# Patient Record
Sex: FEMALE | Race: WHITE | NOT HISPANIC OR LATINO | Employment: FULL TIME | URBAN - METROPOLITAN AREA
[De-identification: names, ages, dates, MRNs, and addresses within clinical notes are randomized per-mention and may not be internally consistent; named-entity substitution may affect disease eponyms.]

---

## 2019-04-04 ENCOUNTER — HOSPITAL ENCOUNTER (OUTPATIENT)
Dept: OTHER | Facility: HOSPITAL | Age: 59
Discharge: HOME | End: 2019-04-04
Attending: NURSE PRACTITIONER

## 2019-04-04 LAB
APPEARANCE UR: CLEAR
BILIRUB UR QL: NEGATIVE
COLOR UR: ABNORMAL
CONV BACTERIA: NEGATIVE
CONV COLLECTION SOURCE (UA): ABNORMAL
CONV HYALINE CASTS IN URINE MICRO: ABNORMAL /[LPF]
CONV UROBILINOGEN IN URINE BY AUTOMATED TEST STRIP: 1 {EHRLICHU}/DL (ref 0.1–1)
GLUCOSE UR QL: NEGATIVE MG/DL
HGB UR QL STRIP: ABNORMAL
KETONES UR QL STRIP: NEGATIVE MG/DL
LEUKOCYTE ESTERASE UR QL STRIP: ABNORMAL
NITRITE UR QL STRIP: NEGATIVE
PH UR STRIP.AUTO: 5 [PH] (ref 5–8)
PROT UR QL: NEGATIVE MG/DL
RBC #/AREA URNS HPF: ABNORMAL /[HPF]
SP GR UR: 1.03 (ref 1–1.03)
SQUAMOUS SPT QL MICRO: ABNORMAL /[HPF]
WBC #/AREA URNS HPF: ABNORMAL /[HPF]

## 2019-04-06 LAB — BACTERIA UR CULT: NORMAL

## 2019-10-31 ENCOUNTER — HOSPITAL ENCOUNTER (OUTPATIENT)
Dept: OTHER | Facility: HOSPITAL | Age: 59
Discharge: HOME | End: 2019-10-31

## 2019-10-31 LAB
ALBUMIN SERPL-MCNC: 4.3 G/DL (ref 3.5–5)
ALBUMIN/GLOB SERPL: 1.3 {RATIO} (ref 1.4–2.6)
ALP SERPL-CCNC: 98 U/L (ref 53–141)
ALT SERPL-CCNC: 42 U/L (ref 10–40)
ANION GAP SERPL CALC-SCNC: 20 MMOL/L (ref 8–19)
AST SERPL-CCNC: 33 U/L (ref 15–50)
BASOPHILS # BLD AUTO: 0.08 10*3/UL (ref 0–0.2)
BASOPHILS NFR BLD AUTO: 1.2 % (ref 0–3)
BILIRUB SERPL-MCNC: 0.32 MG/DL (ref 0.2–1.3)
BUN SERPL-MCNC: 18 MG/DL (ref 5–25)
BUN/CREAT SERPL: 24 {RATIO} (ref 6–20)
CALCIUM SERPL-MCNC: 9.8 MG/DL (ref 8.7–10.4)
CHLORIDE SERPL-SCNC: 104 MMOL/L (ref 99–111)
CHOLEST SERPL-MCNC: 176 MG/DL (ref 107–200)
CHOLEST/HDLC SERPL: 4.5 {RATIO} (ref 3–6)
CONV ABS IMM GRAN: 0.05 10*3/UL (ref 0–0.2)
CONV CO2: 22 MMOL/L (ref 22–32)
CONV IMMATURE GRAN: 0.7 % (ref 0–1.8)
CONV TOTAL PROTEIN: 7.6 G/DL (ref 6.3–8.2)
CREAT UR-MCNC: 0.75 MG/DL (ref 0.5–0.9)
DEPRECATED RDW RBC AUTO: 42.5 FL (ref 36.4–46.3)
EOSINOPHIL # BLD AUTO: 0.39 10*3/UL (ref 0–0.7)
EOSINOPHIL # BLD AUTO: 5.7 % (ref 0–7)
ERYTHROCYTE [DISTWIDTH] IN BLOOD BY AUTOMATED COUNT: 12.1 % (ref 11.7–14.4)
EST. AVERAGE GLUCOSE BLD GHB EST-MCNC: 131 MG/DL
GFR SERPLBLD BASED ON 1.73 SQ M-ARVRAT: >60 ML/MIN/{1.73_M2}
GLOBULIN UR ELPH-MCNC: 3.3 G/DL (ref 2–3.5)
GLUCOSE SERPL-MCNC: 132 MG/DL (ref 65–99)
HBA1C MFR BLD: 6.2 % (ref 3.5–5.7)
HCT VFR BLD AUTO: 43.2 % (ref 37–47)
HDLC SERPL-MCNC: 39 MG/DL (ref 40–60)
HGB BLD-MCNC: 14.2 G/DL (ref 12–16)
LDLC SERPL CALC-MCNC: 98 MG/DL (ref 70–100)
LYMPHOCYTES # BLD AUTO: 2.17 10*3/UL (ref 1–5)
LYMPHOCYTES NFR BLD AUTO: 31.9 % (ref 20–45)
MCH RBC QN AUTO: 31.6 PG (ref 27–31)
MCHC RBC AUTO-ENTMCNC: 32.9 G/DL (ref 33–37)
MCV RBC AUTO: 96 FL (ref 81–99)
MONOCYTES # BLD AUTO: 0.68 10*3/UL (ref 0.2–1.2)
MONOCYTES NFR BLD AUTO: 10 % (ref 3–10)
NEUTROPHILS # BLD AUTO: 3.44 10*3/UL (ref 2–8)
NEUTROPHILS NFR BLD AUTO: 50.5 % (ref 30–85)
NRBC CBCN: 0 % (ref 0–0.7)
OSMOLALITY SERPL CALC.SUM OF ELEC: 298 MOSM/KG (ref 273–304)
PLATELET # BLD AUTO: 257 10*3/UL (ref 130–400)
PMV BLD AUTO: 10 FL (ref 9.4–12.3)
POTASSIUM SERPL-SCNC: 4.1 MMOL/L (ref 3.5–5.3)
RBC # BLD AUTO: 4.5 10*6/UL (ref 4.2–5.4)
SODIUM SERPL-SCNC: 142 MMOL/L (ref 135–147)
TRIGL SERPL-MCNC: 193 MG/DL (ref 40–150)
TSH SERPL-ACNC: 3.57 M[IU]/L (ref 0.27–4.2)
VLDLC SERPL-MCNC: 39 MG/DL (ref 5–37)
WBC # BLD AUTO: 6.81 10*3/UL (ref 4.8–10.8)

## 2019-11-08 ENCOUNTER — OFFICE VISIT CONVERTED (OUTPATIENT)
Dept: FAMILY MEDICINE CLINIC | Facility: CLINIC | Age: 59
End: 2019-11-08
Attending: NURSE PRACTITIONER

## 2019-12-09 ENCOUNTER — OFFICE VISIT CONVERTED (OUTPATIENT)
Dept: FAMILY MEDICINE CLINIC | Facility: CLINIC | Age: 59
End: 2019-12-09
Attending: NURSE PRACTITIONER

## 2019-12-20 ENCOUNTER — HOSPITAL ENCOUNTER (OUTPATIENT)
Dept: GENERAL RADIOLOGY | Facility: HOSPITAL | Age: 59
Discharge: HOME | End: 2019-12-20
Attending: NURSE PRACTITIONER

## 2020-01-07 ENCOUNTER — OFFICE VISIT CONVERTED (OUTPATIENT)
Dept: FAMILY MEDICINE CLINIC | Facility: CLINIC | Age: 60
End: 2020-01-07
Attending: NURSE PRACTITIONER

## 2020-01-09 ENCOUNTER — HOSPITAL ENCOUNTER (OUTPATIENT)
Dept: GENERAL RADIOLOGY | Facility: HOSPITAL | Age: 60
Discharge: HOME | End: 2020-01-09
Attending: NURSE PRACTITIONER

## 2020-02-05 ENCOUNTER — OFFICE VISIT CONVERTED (OUTPATIENT)
Dept: FAMILY MEDICINE CLINIC | Facility: CLINIC | Age: 60
End: 2020-02-05
Attending: NURSE PRACTITIONER

## 2020-11-12 ENCOUNTER — HOSPITAL ENCOUNTER (OUTPATIENT)
Dept: URGENT CARE | Facility: CLINIC | Age: 60
Discharge: HOME | End: 2020-11-12

## 2020-11-14 LAB — BACTERIA SPEC AEROBE CULT: NORMAL

## 2020-11-16 LAB — SARS-COV-2 RNA SPEC QL NAA+PROBE: NOT DETECTED

## 2020-12-17 ENCOUNTER — HOSPITAL ENCOUNTER (OUTPATIENT)
Dept: OTHER | Facility: HOSPITAL | Age: 60
Discharge: HOME | End: 2020-12-17

## 2020-12-17 LAB
ALBUMIN SERPL-MCNC: 3.9 G/DL (ref 3.5–5)
ALBUMIN/GLOB SERPL: 1.2 {RATIO} (ref 1.4–2.6)
ALP SERPL-CCNC: 99 U/L (ref 53–141)
ALT SERPL-CCNC: 35 U/L (ref 10–40)
ANION GAP SERPL CALC-SCNC: 14 MMOL/L (ref 8–19)
AST SERPL-CCNC: 34 U/L (ref 15–50)
BASOPHILS # BLD AUTO: 0.08 10*3/UL (ref 0–0.2)
BASOPHILS NFR BLD AUTO: 1 % (ref 0–3)
BILIRUB SERPL-MCNC: <0.15 MG/DL (ref 0.2–1.3)
BUN SERPL-MCNC: 18 MG/DL (ref 5–25)
BUN/CREAT SERPL: 25 {RATIO} (ref 6–20)
CALCIUM SERPL-MCNC: 9.6 MG/DL (ref 8.7–10.4)
CHLORIDE SERPL-SCNC: 106 MMOL/L (ref 99–111)
CHOLEST SERPL-MCNC: 175 MG/DL (ref 107–200)
CHOLEST/HDLC SERPL: 4.5 {RATIO} (ref 3–6)
CONV ABS IMM GRAN: 0.04 10*3/UL (ref 0–0.2)
CONV CO2: 24 MMOL/L (ref 22–32)
CONV IMMATURE GRAN: 0.5 % (ref 0–1.8)
CONV TOTAL PROTEIN: 7.1 G/DL (ref 6.3–8.2)
CREAT UR-MCNC: 0.73 MG/DL (ref 0.5–0.9)
DEPRECATED RDW RBC AUTO: 41.1 FL (ref 36.4–46.3)
EOSINOPHIL # BLD AUTO: 0.35 10*3/UL (ref 0–0.7)
EOSINOPHIL # BLD AUTO: 4.3 % (ref 0–7)
ERYTHROCYTE [DISTWIDTH] IN BLOOD BY AUTOMATED COUNT: 11.9 % (ref 11.7–14.4)
EST. AVERAGE GLUCOSE BLD GHB EST-MCNC: 131 MG/DL
GFR SERPLBLD BASED ON 1.73 SQ M-ARVRAT: >60 ML/MIN/{1.73_M2}
GLOBULIN UR ELPH-MCNC: 3.2 G/DL (ref 2–3.5)
GLUCOSE SERPL-MCNC: 121 MG/DL (ref 65–99)
HBA1C MFR BLD: 6.2 % (ref 3.5–5.7)
HCT VFR BLD AUTO: 41.3 % (ref 37–47)
HDLC SERPL-MCNC: 39 MG/DL (ref 40–60)
HGB BLD-MCNC: 13.7 G/DL (ref 12–16)
LDLC SERPL CALC-MCNC: 86 MG/DL (ref 70–100)
LYMPHOCYTES # BLD AUTO: 2.7 10*3/UL (ref 1–5)
LYMPHOCYTES NFR BLD AUTO: 33 % (ref 20–45)
MCH RBC QN AUTO: 31 PG (ref 27–31)
MCHC RBC AUTO-ENTMCNC: 33.2 G/DL (ref 33–37)
MCV RBC AUTO: 93.4 FL (ref 81–99)
MONOCYTES # BLD AUTO: 0.63 10*3/UL (ref 0.2–1.2)
MONOCYTES NFR BLD AUTO: 7.7 % (ref 3–10)
NEUTROPHILS # BLD AUTO: 4.37 10*3/UL (ref 2–8)
NEUTROPHILS NFR BLD AUTO: 53.5 % (ref 30–85)
NRBC CBCN: 0 % (ref 0–0.7)
OSMOLALITY SERPL CALC.SUM OF ELEC: 293 MOSM/KG (ref 273–304)
PLATELET # BLD AUTO: 256 10*3/UL (ref 130–400)
PMV BLD AUTO: 10.1 FL (ref 9.4–12.3)
POTASSIUM SERPL-SCNC: 3.9 MMOL/L (ref 3.5–5.3)
RBC # BLD AUTO: 4.42 10*6/UL (ref 4.2–5.4)
SODIUM SERPL-SCNC: 140 MMOL/L (ref 135–147)
TRIGL SERPL-MCNC: 250 MG/DL (ref 40–150)
TSH SERPL-ACNC: 1.7 M[IU]/L (ref 0.27–4.2)
VLDLC SERPL-MCNC: 50 MG/DL (ref 5–37)
WBC # BLD AUTO: 8.17 10*3/UL (ref 4.8–10.8)

## 2020-12-28 ENCOUNTER — HOSPITAL ENCOUNTER (OUTPATIENT)
Dept: OTHER | Facility: HOSPITAL | Age: 60
Discharge: HOME | End: 2020-12-28
Attending: INTERNAL MEDICINE

## 2021-01-21 ENCOUNTER — HOSPITAL ENCOUNTER (OUTPATIENT)
Dept: OTHER | Facility: HOSPITAL | Age: 61
Discharge: HOME | End: 2021-01-21
Attending: INTERNAL MEDICINE

## 2021-04-02 ENCOUNTER — OFFICE VISIT CONVERTED (OUTPATIENT)
Dept: FAMILY MEDICINE CLINIC | Facility: CLINIC | Age: 61
End: 2021-04-02
Attending: NURSE PRACTITIONER

## 2021-04-15 ENCOUNTER — OFFICE VISIT CONVERTED (OUTPATIENT)
Dept: UROLOGY | Facility: CLINIC | Age: 61
End: 2021-04-15
Attending: UROLOGY

## 2021-04-15 ENCOUNTER — CONVERSION ENCOUNTER (OUTPATIENT)
Dept: SURGERY | Facility: CLINIC | Age: 61
End: 2021-04-15

## 2021-04-15 LAB
BILIRUB UR QL STRIP: NEGATIVE
COLOR UR: YELLOW
CONV BACTERIA IN URINE MICRO: 0
CONV CALCIUM OXALATE CRYSTALS /HPF IN URINE SEDIMENT BY MICROSCOPY: 0
CONV CLARITY OF URINE: CLEAR
CONV PROTEIN IN URINE BY AUTOMATED TEST STRIP: NEGATIVE
CONV UROBILINOGEN IN URINE BY AUTOMATED TEST STRIP: 0.2
GLUCOSE UR QL: NEGATIVE
HGB UR QL STRIP: NORMAL
KETONES UR QL STRIP: NEGATIVE
LEUKOCYTE ESTERASE UR QL STRIP: NEGATIVE
NITRITE UR QL STRIP: NEGATIVE
PH UR STRIP.AUTO: 5.5 [PH]
RBC #/AREA URNS HPF: 0 /[HPF]
RENAL EPI CELLS #/AREA URNS HPF: 0 /[HPF]
SP GR UR: NORMAL
SQUAMOUS SPT QL MICRO: 0
WBC #/AREA URNS HPF: 0 /[HPF]

## 2021-04-16 ENCOUNTER — TELEMEDICINE CONVERTED (OUTPATIENT)
Dept: PSYCHIATRY | Facility: CLINIC | Age: 61
End: 2021-04-16
Attending: NURSE PRACTITIONER

## 2021-05-11 NOTE — H&P
History and Physical      Patient Name: Ami Lewis   Patient ID: [id number]   Sex: Female   YOB: 1960    Primary Care Provider: Royal DE OLIVEIRA   Referring Provider: Royal DE OLIVEIRA    Visit Date: April 16, 2021    Provider: YENNIFER Smith   Location: [facility]   Location Address: [address] [place] [address]  [id number]   Location Phone: [phone]          Chief Complaint     Patient presents with depression       History Of Present Illness  Ami Lewis is a 60 year old /White female who presents to the office today referred by Royal DE OLIVEIRA.   Chart Review 4/16/21: Patient last seen by PCP 4/2/21 and referred related to ADD. PHQ9 score of 7. Patient has history of arthritis, deafness and migraines. No recent lab work, EKG or OP head imaging.   Virtual visit via Zoom audio and video due to the COVID-19 pandemic. Patient is accepting of and agreeable to appointment. The appointment consisted of the patient and I only. Interview: Patient reports she has had trouble with concentration and focus since she was a child. Pt reports getting in trouble in school r/t being to hyper, talkative and restless. Pt able to graduate high school and take many college classes with no stimulant medication. Pt reports trouble focusing more at home, in which she is unable to concentrate when reading, giving the example that she can read a paragraph over and over without being able to retain the information. Pt reports being easily distracted. Pt reports less trouble focusing at work, as she works in a fast paced environment where she is constantly busy.   PSYCHIATRIC REVIEW OF SYSTEMS: Patient reports current depression (sleep problems, poor energy or concentration or memory, appetite changes, psychomotor retardation) and anxiety (worry large amount of things to excess, causes sleep disturbance). Pt denies any current or previous  hallucinations/delusions, paranoia, manic symptoms (euphoria, increased energy, grandiosity, decreased sleep, pleasure seeking, pressured speech, impulsivity), panic attacks, PTSD (flashbacks, nightmares, avoidance/numb response to activities, detachment/withdrawal, startle response, irritability).   Denies SI HI AVH.   ...   Past Psychiatric History:  Began Psychiatric Treatment:    Dx: Denies   Psychiatrist: Denies   Therapist: Denies   : Denies   Admissions History: Denies   Medication Trials: Pt was on Zoloft in the past for depression, with no benefit.   Self-Harm: Denies   Suicide Attempts: Denies   Substance Abuse History:  Types: Smokes 1/2 ppd of cigarettes. Pt reports interest in smoking cessation.   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   Kids: 4 adult children   House: Lives alone    Hx: Denies   Family History:  Suicide Attempts: Denies   Suicide Completions: Denies   Substance Use: Denies   Psychiatric Conditions: Denies    depression, psychosis, anxiety: Denies   Developmental History:  Born: [facility], but moved to [place] in  as father was in .   Siblings: 2 brothers and one sister   Childhood: no abuse   High School: Graduate   College: Some   CCAPS/JEOVANY: n/a   Access to Weapons: Denies   Thought Content: no suicidal ideation, no homicidal ideation, no auditory hallucinations, and no visual hallucinations       Past Medical History  Disease Name Date Onset Notes   Arthritis: Ankle/Foot 10/21/2014 --    Deafness --  --    Migraine Headaches --  --          Past Surgical History  Procedure Name Date Notes   Ceasarian section --  --    Tubal ligation --  --          Medication List  Name Date Started Instructions   Novofine 32 32  "gauge x 1/4\" miscellaneous needle 04/02/2021 use as directed   Saxenda 3 mg/0.5 mL (18 mg/3 mL) subcutaneous pen injector 04/02/2021 inject 3 mg by subcutaneous route once daily in the abdomen, thigh, or upper arm         Allergy List  Allergen Name Date Reaction Notes   Codeine Phosphate --  --  --    Codeine Sulfate --  --  --    Muscle Relaxants --  --  --    SULFA (SULFONAMIDES) --  --  --          Family Medical History  Disease Name Relative/Age Notes   Breast Neoplasm, Malignant  --    Ovarian Cancer, Family History  --          Social History  Finding Status Start/Stop Quantity Notes   Alcohol Current some day --/-- --  04/15/2021 - rarely    --  --/-- --  --    Tobacco Current every day --/-- .5 ppd --          Immunizations  NameDate Admin Mfg Trade Name Lot Number Route Inj VIS Given VIS Publication   [id number]/14/2020 SKB Fluarix, quadrivalent, preservative free [id number] NE NE 04/02/2021    Comments:          Review of Systems  · Constitutional  o Admits  o : fatigue  o Denies  o : night sweats  · Eyes  o Denies  o : double vision, blurred vision  · HENT  o Denies  o : vertigo, recent head injury  · Cardiovascular  o Denies  o : chest pain, irregular heart beats  · Respiratory  o Denies  o : shortness of breath, productive cough  · Gastrointestinal  o Denies  o : nausea, vomiting  · Genitourinary  o Admits  o : frequency, incontinence  o Denies  o : dysuria, urinary retention  · Integument  o Denies  o : hair growth change, new skin lesions  · Neurologic  o Denies  o : altered mental status, seizures  · Musculoskeletal  o Denies  o : joint swelling, limitation of motion  · Endocrine  o Denies  o : cold intolerance, heat intolerance  · Psychiatric  o Admits  o : obsessive compulsive disorder  o Denies  o : anxiety, depression, post traumatic stress disorder, psychosis, fredrick  · Allergic-Immunologic  o Denies  o : frequent illnesses      Vitals  Date Time BP Position Site L\R Cuff Size HR RR TEMP " "(F) WT  HT  BMI kg/m2 BSA m2 O2 Sat FR L/min FiO2 HC       04/15/2021 03:13 /51 Sitting    96 - R   243lbs 2oz 5'  9.5\" 35.39 2.33             Physical Examination  · Mental Status Exam  o Appearance  o : good eye contact, normal street clothes, groomed, sitting in chair  o Behavior  o : pleasant and cooperative  o Motor  o : no abnormal  o Speech  o : normal rhythm, rate, volume, tone, not hyperverbal, not pressured, normal prosidy  o Mood  o : \"Good\"  o Affect  o : euthymic  o Thought Content  o : negative suicidal ideations, negative homicidal ideations, negative obsessions  o Perceptions  o : negative auditory hallucinations, negative visual hallucinations  o Thought Process  o : linear  o Insight/Judgement  o : fair/fair  o Cognition  o : grossly intact  o Attention  o : intact              Assessment  · Anxiety Disorder     300.00/F41.9  · Attention Deficit Disorder     314.00/F90.9  · Sleep Disorder     780.50/G47.9  · Depression, unspecified depression type     311/F32.9       Presentation most consistent with   Unspecified depressive disorder  Unspecified anxiety disorder  R/o ADHD    Pt presents today wanting treatment for ADD as she hopes to go to nursing school in the future. Pt does reports some symptoms of ADD, but will send to neuropsychological testing to determine if patient does indeed have ADD. Will start on bupropion xl to target depression, ADD symptoms and smoking cessation.     Referral sent for [facility]- Adult ADHD testing.     16 minutes of supportive psychotherapy with goal to strengthen defenses, promote problems solving, restore adaptive functioning and provide symptom relief. The therapeutic alliance was strengthened to encourage the patient to express their thoughts and feelings. Esteem building was enhanced through praise, reassurance, normalizing and encouragement. Coping skills were enhanced to build distress tolerance skills and emotional regulation. " Patient given education on medication side effects, diagnosis/illness and relapse symptoms. Plan to continue supportive psychotherapy in next appointment to provide symptom relief.     1 month       Plan  · Orders  o Psychiatric Consultation (PSYCH) - - 04/16/2021  o ACO-39: Current medications updated and reviewed (, 1159F) - - 04/15/2021  · Medications  o bupropion HCl 150 mg oral tablet extended release 24 hr   SIG: take 1 tablet (150 mg) by oral route once daily for 30 days   DISP: (30) Tablet with 0 refills  Prescribed on 04/16/2021     o Medications have been Reconciled  o Transition of Care or Provider Policy  · Instructions  o Behavior Modification discussed  o Risk Assessment: Risk of self-harm acutely is low. Risk factors include anxiety disorder, mood disorder, and recent psychosocial stressors (pandemic). Protective factors include no family history, denies access to guns/weapons, no present SI, no history of suicide attempts or self-harm in the past, minimal AODA, healthcare seeking, future orientation, willingness to engage in care. Risk of self-harm chronically is also low but could be further elevated in the event of treatment noncompliance and/or AODA.  o Safety: No acute safety concerns.   o Medications: START bupropion xl 150mg po qday to target depression, adhd symptoms and smoking cessation. Risks, benefits, alternatives discussed with patient including nausea, GI upset, increased energy, exacerbation of irritability, lowering of seizure threshold. After discussion of these risks and benefits, the patient voiced understanding and agreed to proceed.  o Referral: [facility]- Adult ADHD [id number] [place] [address] [phone]  o Follow Up: 1 month  · Disposition  o Call or Return if symptoms worsen or persist.            Electronically Signed by: YENNIFER Smith -Author on April 16, 2021 02:46:53 PM

## 2021-05-11 NOTE — H&P
"   History and Physical      Patient Name: Ami Lewis   Patient ID: [id number]   Sex: Female   YOB: 1960    Primary Care Provider: Royal DE OLIVEIRA   Referring Provider: Royal DE OLIVEIRA    Visit Date: April 15, 2021    Provider: Zahra Gallagher MD   Location: [facility]   Location Address: [address] [place] [address]  [id number]   Location Phone: [phone]          Chief Complaint  · urological issues      History Of Present Illness  The patient is a 60 year old /White female, who presents on referral from Royal DE OLIVEIRA, for an urological evaluation for urinary incontinence which the patient associates with no known event.   Urge Incontinence:   The patient states she has had multiple episodes with and without urgency in the past years. She describes leakage of small amounts, moderate amounts, and large amounts of urine. The patients symptoms are getting worse. The patient uses 2 pads a day. She also reports frequency and nocturia. She denies needing to strain to void, an intermittent stream, and incomplete emptying.   Stress Incontinence:   She does have leaking with coughing, sneezing, and laughing. This has been occuring for years. This leaking has been getting worse.   The patient notes aggravating factors are coughing, laughing, exercising, and sneezing There no identified alleviating factors. Her past medical history is non-contributory.   She denies a history of dementia, diabetes, and recurrent urinary tract infections. The patient has not been previously evaluated for this condition.   Previous Treatment:   She has not received treatment in the past.       Past Medical History  Arthritis: Ankle/Foot; Deafness; Migraine Headaches         Past Surgical History  Ceasarian section; Tubal ligation         Medication List  Novofine 32 32 gauge x 1/4\" miscellaneous needle; Saxenda 3 mg/0.5 mL (18 mg/3 mL) subcutaneous pen injector         Allergy " "List  Codeine Phosphate; Codeine Sulfate; Muscle Relaxants; SULFA (SULFONAMIDES)       Allergies Reconciled  Family Medical History  Breast Neoplasm, Malignant; Ovarian Cancer, Family History         Social History  Alcohol (Current some day); ; Tobacco (Current every day)         Immunizations  Name Date Admin   Influenza 10/14/2020   Influenza 11/08/2019         Review of Systems  · Constitutional  o Denies  o : fatigue, night sweats  · Eyes  o Denies  o : double vision, blurred vision  · HENT  o Denies  o : vertigo, recent head injury  · Breasts  o Denies  o : abnormal changes in breast size, additional breast symptoms except as noted in the HPI  · Cardiovascular  o Denies  o : chest pain, irregular heart beats  · Respiratory  o Denies  o : shortness of breath, productive cough  · Gastrointestinal  o Denies  o : nausea, vomiting  · Genitourinary  o Admits  o : urgency, frequency, incontinence  o Denies  o : dysuria, urinary retention  · Integument  o Denies  o : hair growth change, new skin lesions  · Neurologic  o Denies  o : altered mental status, seizures  · Musculoskeletal  o Denies  o : joint swelling, limitation of motion  · Endocrine  o Denies  o : cold intolerance, heat intolerance  · Heme-Lymph  o Denies  o : petechiae, lymph node enlargement or tenderness  · Allergic-Immunologic  o Denies  o : frequent illnesses      Vitals  Date Time BP Position Site L\R Cuff Size HR RR TEMP (F) WT  HT  BMI kg/m2 BSA m2 O2 Sat FR L/min FiO2        04/15/2021 03:13 /51 Sitting    96 - R   243lbs 2oz 5'  9.5\" 35.39 2.33             Physical Examination  · Constitutional  o Appearance  o : Well nourished, well developed patient in no acute distress. Ambulating without difficulty.  · Respiratory  o Respiratory Effort  o : breathing unlabored  · Skin and Subcutaneous Tissue  o General Inspection  o : No rashes, lesions or areas of discoloration present. Skin turgor is normal.  o General Palpation  o : No " abnormalities, masses or tenderness on palpation.          Results  · In-Office Procedures  o Lab procedure  § Automated dipstick urinalysis with microscopy (48584)   § Color Ur: Yellow   § Clarity Ur: Clear   § Glucose Ur Ql Strip: Negative   § Bilirub Ur Ql Strip: Negative   § Ketones Ur Ql Strip: Negative   § Sp Gr Ur Qn: &gt;=1.030   § Hgb Ur Ql Strip: Moderate   § pH Ur-LsCnc: 5.5   § Prot Ur Ql Strip: Negative   § Urobilinogen Ur Strip-mCnc: 0.2   § Nitrite Ur Ql Strip: Negative   § WBC Est Ur Ql Strip: Negative   § RBC UrnS Qn HPF: 0   § WBC UrnS Qn HPF: 0   § Bacteria UrnS Qn HPF: 0   § Crystals UrnS Qn HPF: 0   § Epithelial Cells (non renal): 0 /HPF  § Epithelial Cells (renal): 00   o Surgical procedure  § IOP - Bladder Scan/Residual Urine (72359)   § Specimen vol Ur: 0       Assessment  · Urge Incontinence     788.31/N39.41  · Female genuine stress incontinence     625.6/N39.3      Plan  · Medications  o Myrbetriq 25 mg oral tablet extended release 24 hr   SIG: take 1 tablet (25 mg) by oral route once daily swallowing whole with water. Do not crush, chew and/or divide. for 28 days   DISP: (28) Tablet with 0 refills  Prescribed on 04/15/2021     o Medications have been Reconciled  o Transition of Care or Provider Policy  · Instructions  o PLAN:  o Diagnosis of urge incontinence was discussed with patient. She was counseled on treatments and behavorial modifications to prevent and reduce urgency. She will proceed with anticholinergic therapy and retutn to clinic for follow up.   o The diagnosis of stress incontinence was discussed in detail with the patient. Ample time was given for discussion and to answer questions. She will proceed with Kegel exercises and return to clinic.   o FOLLOW-UP:  o In 2 months  o Electronically Identified Patient Education Materials Provided Electronically            Electronically Signed by: Zahra Gallagher MD -Author on April 15, 2021 05:34:48 PM

## 2021-05-14 VITALS
BODY MASS INDEX: 35.55 KG/M2 | HEART RATE: 101 BPM | TEMPERATURE: 98.5 F | WEIGHT: 240 LBS | HEIGHT: 69 IN | OXYGEN SATURATION: 97 % | SYSTOLIC BLOOD PRESSURE: 142 MMHG | DIASTOLIC BLOOD PRESSURE: 80 MMHG

## 2021-05-14 VITALS
SYSTOLIC BLOOD PRESSURE: 147 MMHG | DIASTOLIC BLOOD PRESSURE: 51 MMHG | HEIGHT: 69 IN | HEART RATE: 96 BPM | WEIGHT: 243.12 LBS | BODY MASS INDEX: 36.01 KG/M2

## 2021-05-14 NOTE — PROGRESS NOTES
Progress Note      Patient Name: Ami Lewis   Patient ID: [id number]   Sex: Female   YOB: 1960    Primary Care Provider: Royal DE OLIVEIRA    Visit Date: April 2, 2021    Provider: YENNIFER Ortiz   Location: [facility]   Location Address: [address] [place] [address], [address]  [place], [place]  [id number]   Location Phone: [phone]          Chief Complaint  · Talk about ADHD      History Of Present Illness  Ami Lewis is a 60 year old /White female who presents for evaluation and treatment of:      Patient presents to the office today to discuss ADD. She states that she is looking to start back to going to school and has concerns of her inattention and inability to stay focused. Patient does state that she would like an evaluation to discuss possible Maikel options. I did explain that I would refer her to psychiatry for evaluation    Patient states that she would like to start back on Saxenda as well. She states it was very beneficial for but stopped taking it during Covid. She states that she would like refills of this to get started back right away. Did discuss the dosing regimen and for her to start at the 0.6 mg daily for a week.       Past Medical History  Disease Name Date Onset Notes   Arthritis: Ankle/Foot 10/21/2014 --    Deafness --  --    Migraine Headaches --  --          Past Surgical History  Procedure Name Date Notes   Ceasarian section --  --    Tubal ligation --  --          Allergy List  Allergen Name Date Reaction Notes   Codeine Phosphate --  --  --    Codeine Sulfate --  --  --    SULFA (SULFONAMIDES) --  --  --        Allergies Reconciled  Family Medical History  Disease Name Relative/Age Notes   Breast Neoplasm, Malignant  --    Ovarian Cancer, Family History  --          Social History  Finding Status Start/Stop Quantity Notes   Alcohol Current some day --/-- --  social    --  --/-- --  --    Tobacco Former --/-- --  --   "        Immunizations  NameDate Admin Mfg Trade Name Lot Number Route Inj VIS Given VIS Publication   [id number]/14/2020 SKB Fluarix, quadrivalent, preservative free [id number] NE NE 04/02/2021    Comments:          Review of Systems  · Constitutional  o Denies  o : fever, fatigue, weight loss, weight gain  · Cardiovascular  o Denies  o : lower extremity edema, claudication, chest pressure, palpitations  · Respiratory  o Denies  o : shortness of breath, wheezing, cough, hemoptysis, dyspnea on exertion  · Gastrointestinal  o Denies  o : nausea, vomiting, diarrhea, constipation, abdominal pain  · Neurologic  o Admits  o : difficulty concentrating      Vitals  Date Time BP Position Site L\R Cuff Size HR RR TEMP (F) WT  HT  BMI kg/m2 BSA m2 O2 Sat FR L/min FiO2        04/02/2021 03:29 /80 Sitting    101 - R  98.5 240lbs 0oz 5'  9\" 35.44 2.3 97 %            Physical Examination  · Constitutional  o Appearance  o : well-nourished, in no acute distress  · Neck  o Inspection/Palpation  o : normal appearance, no masses or tenderness, trachea midline  o Range of Motion  o : cervical range of motion within normal limits  o Thyroid  o : gland size normal, nontender, no nodules or masses present on palpation  · Respiratory  o Respiratory Effort  o : breathing unlabored  o Inspection of Chest  o : normal appearance  o Auscultation of Lungs  o : normal breath sounds throughout inspiration and expiration  · Cardiovascular  o Heart  o :   § Auscultation of Heart  § : regular rate and rhythm, no murmurs, gallops or rubs  o Peripheral Vascular System  o :   § Extremities  § : no clubbing or edema  · Skin and Subcutaneous Tissue  o General Inspection  o : no rashes or lesions present, no areas of discoloration  o Body Hair  o : hair normal for age, general body hair distribution normal for age  o Digits and Nails  o : no clubbing, cyanosis, deformities or edema present, normal appearing nails  · Neurologic  o Mental Status " "Examination  o :   § Orientation  § : grossly oriented to person, place and time  o Gait and Station  o : normal gait, able to stand without difficulty  · Psychiatric  o Judgement and Insight  o : judgment and insight intact  o Mood and Affect  o : mood normal, affect appropriate  o Presence of Abnormal Thoughts  o : no hallucinations, no delusions present, no psychotic thoughts          Assessment  · Screening for depression     V79.0/Z13.89  · Inattention     799.51/R41.840  · Obesity, Class II, BMI 35-39.9     278.00/E66.9      Plan  · Orders  o Annual depression screening, 15 minutes (, [address]) - V79.0/Z13.89 - 04/02/2021  o Psychiatric Consultation (PSYCH) - V79.0/Z13.89 - 04/02/2021   Dr Johnson  o ACO-18: Positive screen for clinical depression using a standardized tool and a follow-up plan documented () - V79.0/Z13.89 - 04/02/2021  o ACO-18: Positive screen for clinical depression using a standardized tool and a follow-up plan documented () - - 04/02/2021  o ACO-40: Depression Remission at 12 months as demonstrated by a 12 month repeat PHQ-9 of less than 5 () - - 04/02/2021  o ACO-39: Current medications updated and reviewed (, 1159F) - - 04/02/2021  · Medications  o Saxenda 3 mg/0.5 mL (18 mg/3 mL) subcutaneous pen injector   SIG: inject 3 mg by subcutaneous route once daily in the abdomen, thigh, or upper arm   DISP: (5) Pen Needle with 2 refills  Prescribed on 04/02/2021     o Novofine 32 32 gauge x 1/4\" miscellaneous needle   SIG: use as directed   DISP: (1) Box with 5 refills  Prescribed on 04/02/2021     o Medications have been Reconciled  o Transition of Care or Provider Policy  · Instructions  o Depression Screen completed and scanned into the EMR under the designated folder within the patient's documents.  o Patient was educated/instructed on their diagnosis, treatment and medications prior to discharge from the clinic today.  o Minutes spent with patient including greater than " 50% in Education/Counseling/Care Coordination.  o Time spent with the patient was minutes, more than 50% face to face.  o Electronically Identified Patient Education Materials Provided Electronically  · Disposition  o Call or Return if symptoms worsen or persist.  o Follow up in 3 months  o follow up in 6 months  o Care Transition  o CHUCK Sent            Electronically Signed by: YENNIFER Ortiz -Author on April 2, 2021 04:33:13 PM

## 2021-05-15 VITALS
SYSTOLIC BLOOD PRESSURE: 124 MMHG | DIASTOLIC BLOOD PRESSURE: 78 MMHG | WEIGHT: 224 LBS | OXYGEN SATURATION: 97 % | BODY MASS INDEX: 33.18 KG/M2 | TEMPERATURE: 98.6 F | RESPIRATION RATE: 16 BRPM | HEIGHT: 69 IN | HEART RATE: 88 BPM

## 2021-05-15 VITALS
OXYGEN SATURATION: 96 % | RESPIRATION RATE: 16 BRPM | HEIGHT: 69 IN | TEMPERATURE: 97.1 F | HEART RATE: 101 BPM | WEIGHT: 242.31 LBS | BODY MASS INDEX: 35.89 KG/M2 | SYSTOLIC BLOOD PRESSURE: 138 MMHG | DIASTOLIC BLOOD PRESSURE: 72 MMHG

## 2021-05-15 VITALS
WEIGHT: 229 LBS | BODY MASS INDEX: 33.92 KG/M2 | TEMPERATURE: 98.6 F | DIASTOLIC BLOOD PRESSURE: 82 MMHG | HEART RATE: 93 BPM | SYSTOLIC BLOOD PRESSURE: 144 MMHG | OXYGEN SATURATION: 94 % | HEIGHT: 69 IN | RESPIRATION RATE: 16 BRPM

## 2021-05-15 VITALS
RESPIRATION RATE: 16 BRPM | HEIGHT: 69 IN | BODY MASS INDEX: 32.44 KG/M2 | OXYGEN SATURATION: 96 % | TEMPERATURE: 95.5 F | HEART RATE: 90 BPM | WEIGHT: 219 LBS | DIASTOLIC BLOOD PRESSURE: 82 MMHG | SYSTOLIC BLOOD PRESSURE: 142 MMHG

## 2021-05-25 ENCOUNTER — TELEMEDICINE CONVERTED (OUTPATIENT)
Dept: PSYCHIATRY | Facility: CLINIC | Age: 61
End: 2021-05-25
Attending: NURSE PRACTITIONER

## 2021-06-05 NOTE — PROGRESS NOTES
Progress Note      Patient Name: Ami Lewis   Patient ID: [id number]   Sex: Female   YOB: 1960    Primary Care Provider: Royal DE OLIVEIRA   Referring Provider: Royal DE OLIVEIRA    Visit Date: May 25, 2021    Provider: YENNIFER Smith   Location: [facility]   Location Address: [address] [place] [address]  [id number]   Location Phone: [phone]          Chief Complaint     Patient presents with depression       History Of Present Illness  Ami Lewis is a 60 year old /White female who presents to the office today referred by Royal DE OLIVEIRA.   Virtual visit via Zoom audio and video due to the COVID-19 pandemic. Patient is accepting of and agreeable to appointment. The appointment consisted of the patient and I only. Interview: Patient reports her depression has been about the same over the past month since starting the bupropion. Patient reports energy has been low. Trouble concentrating and focusing on tasks at home and work. No depressed mood. No hopelessness. Denies suicidal thoughts. Sleeping good. Denies any symptoms of anxiety. Patient has been spending more time outside as the weather has been much nicer, which she enjoys.   Denies SI HI AVH.   ...   ...   ...   Medication Trials: Pt was on Zoloft in the past for depression, with no benefit.   Substance Abuse History:  Types: Smokes 1/2 ppd of cigarettes. Pt reports interest in smoking cessation.   CCAPS/JEOVANY: n/a   Access to Weapons: Denies   Thought Content: no suicidal ideation, no homicidal ideation, no auditory hallucinations, and no visual hallucinations       Past Medical History  Disease Name Date Onset Notes   Anxiety --  --    Arthritis: Ankle/Foot 10/21/2014 --    Deafness --  --    Depression (emotion) --  --    Head injury --  --    Migraine Headaches --  --          Past Surgical History  Procedure Name Date Notes   Ceasarian section --  --    Foot surgery --   "--    Tubal ligation --  --          Medication List  Name Date Started Instructions   bupropion HCl 150 mg oral tablet extended release 24 hr 04/16/2021 take 1 tablet (150 mg) by oral route once daily for 30 days   Myrbetriq 25 mg oral tablet extended release 24 hr 04/15/2021 take 1 tablet (25 mg) by oral route once daily swallowing whole with water. Do not crush, chew and/or divide. for 28 days   Novofine 32 32 gauge x 1/4\" miscellaneous needle 04/02/2021 use as directed   Saxenda 3 mg/0.5 mL (18 mg/3 mL) subcutaneous pen injector 04/02/2021 inject 3 mg by subcutaneous route once daily in the abdomen, thigh, or upper arm         Allergy List  Allergen Name Date Reaction Notes   Codeine Phosphate --  --  --    Codeine Sulfate --  --  --    Muscle Relaxants --  --  --    SULFA (SULFONAMIDES) --  --  --          Family Medical History  Disease Name Relative/Age Notes   Breast Neoplasm, Malignant  --    Ovarian Cancer, Family History  --          Social History  Finding Status Start/Stop Quantity Notes   Alcohol Current some day --/-- --  04/15/2021 - rarely    --  --/-- --  --    Tobacco Current every day --/-- .5 ppd --          Immunizations  NameDate Admin Mfg Trade Name Lot Number Route Inj VIS Given VIS Publication   [id number]/14/2020 SKB Fluarix, quadrivalent, preservative free [id number] NE NE 04/02/2021    Comments:          Review of Systems  · Constitutional  o Denies  o : fatigue, night sweats  · Eyes  o Denies  o : double vision, blurred vision  · HENT  o Denies  o : vertigo, recent head injury  · Breasts  o Denies  o : abnormal changes in breast size, additional breast symptoms except as noted in the HPI  · Cardiovascular  o Denies  o : chest pain, irregular heart beats  · Respiratory  o Denies  o : shortness of breath, productive cough  · Gastrointestinal  o Denies  o : nausea, vomiting  · Genitourinary  o Denies  o : dysuria, urinary retention  · Integument  o Denies  o : hair growth change, new " "skin lesions  · Neurologic  o Denies  o : altered mental status, seizures  · Musculoskeletal  o Denies  o : joint swelling, limitation of motion  · Endocrine  o Denies  o : cold intolerance, heat intolerance  · Heme-Lymph  o Denies  o : petechiae, lymph node enlargement or tenderness  · Allergic-Immunologic  o Denies  o : frequent illnesses      Vitals  Date Time BP Position Site L\R Cuff Size HR RR TEMP (F) WT  HT  BMI kg/m2 BSA m2 O2 Sat FR L/min FiO2 HC       04/15/2021 03:13 /51 Sitting    96 - R   243lbs 2oz 5'  9.5\" 35.39 2.33             Physical Examination  · Mental Status Exam  o Appearance  o : good eye contact, normal street clothes, groomed, sitting in chair  o Behavior  o : pleasant and cooperative  o Motor  o : no abnormal  o Speech  o : normal rhythm, rate, volume, tone, not hyperverbal, not pressured, normal prosidy  o Mood  o : \"Good\"  o Affect  o : euthymic  o Thought Content  o : negative suicidal ideations, negative homicidal ideations, negative obsessions  o Perceptions  o : negative auditory hallucinations, negative visual hallucinations  o Thought Process  o : linear  o Insight/Judgement  o : fair/fair  o Cognition  o : grossly intact  o Attention  o : intact              Assessment  · Anxiety Disorder     300.00/F41.9  · Attention Deficit Disorder     314.00/F90.9  · Sleep Disorder     780.50/G47.9  · Depression, unspecified depression type     311/F32.9       Presentation most consistent with   Unspecified depressive disorder  Unspecified anxiety disorder  R/o ADHD    Patient reports no changes in mood or concentration since starting bupropion xl 150mg po qday. Will increase to 300mg po qday to target mood and ADHD symptoms. Patient ran out of bupropion one week ago, so will start back at 150mg and titrate up to 300mg.     Status post referral for Adult ADHD testing. Patient states she has been busy this past month and has not had a chance to call and schedule an appointment.     5 " minutes of supportive psychotherapy with goal to strengthen defenses, promote problems solving, restore adaptive functioning and provide symptom relief. The therapeutic alliance was strengthened to encourage the patient to express their thoughts and feelings. Esteem building was enhanced through praise, reassurance, normalizing and encouragement. Coping skills were enhanced to build distress tolerance skills and emotional regulation. Patient given education on medication side effects, diagnosis/illness and relapse symptoms. Plan to continue supportive psychotherapy in next appointment to provide symptom relief.     1 month       Plan  · Orders  o Psychiatric Consultation (PSYCH) - - 05/24/2021  o ACO-39: Current medications updated and reviewed (, 1159F) - - 05/24/2021  · Medications  o bupropion HCl 150 mg oral tablet extended release 24 hr   SIG: take 1 tablet (150 mg) by mouth daily for 7 days, then take 2 tablets (300mg) by mouth daily   DISP: (60) Tablet with 0 refills  Adjusted on 05/25/2021     o Medications have been Reconciled  o Transition of Care or Provider Policy  · Instructions  o Behavior Modification discussed  o Risk Assessment: Risk of self-harm acutely is low. Risk factors include anxiety disorder, mood disorder, and recent psychosocial stressors (pandemic). Protective factors include no family history, denies access to guns/weapons, no present SI, no history of suicide attempts or self-harm in the past, minimal AODA, healthcare seeking, future orientation, willingness to engage in care. Risk of self-harm chronically is also low but could be further elevated in the event of treatment noncompliance and/or AODA.  o Safety: No acute safety concerns. Denies current suicidal thoughts. Pt encouraged to call 911 or go to the nearest emergency room if pt were to develop suicidal thoughts.  o Medications: INCREASE bupropion xl 150mg po qday to 300mg po qday after 7 days to target depression, adhd  symptoms and smoking cessation. Risks, benefits, alternatives discussed with patient including nausea, GI upset, increased energy, exacerbation of irritability, lowering of seizure threshold. After discussion of these risks and benefits, the patient voiced understanding and agreed to proceed.  o Referral: Status post Adult ADHD neuropsychological testing.   o Follow Up: 1 month  · Disposition  o Call or Return if symptoms worsen or persist.            Electronically Signed by: YENNIFER Smith -Author on May 25, 2021 10:20:44 AM

## 2021-06-21 DIAGNOSIS — N32.81 OAB (OVERACTIVE BLADDER): Primary | ICD-10-CM

## 2021-07-14 PROCEDURE — 87081 CULTURE SCREEN ONLY: CPT | Performed by: NURSE PRACTITIONER

## 2021-07-16 ENCOUNTER — TELEPHONE (OUTPATIENT)
Dept: URGENT CARE | Facility: CLINIC | Age: 61
End: 2021-07-16

## 2021-07-19 ENCOUNTER — TELEPHONE (OUTPATIENT)
Dept: URGENT CARE | Facility: CLINIC | Age: 61
End: 2021-07-19

## 2021-10-18 ENCOUNTER — TRANSCRIBE ORDERS (OUTPATIENT)
Dept: LAB | Facility: HOSPITAL | Age: 61
End: 2021-10-18

## 2021-10-18 ENCOUNTER — LAB (OUTPATIENT)
Dept: LAB | Facility: HOSPITAL | Age: 61
End: 2021-10-18

## 2021-10-18 DIAGNOSIS — Z13.9 ENCOUNTER FOR HEALTH-RELATED SCREENING: Primary | ICD-10-CM

## 2021-10-18 DIAGNOSIS — Z13.9 ENCOUNTER FOR HEALTH-RELATED SCREENING: ICD-10-CM

## 2021-10-18 PROCEDURE — 36415 COLL VENOUS BLD VENIPUNCTURE: CPT

## 2021-10-18 PROCEDURE — 86481 TB AG RESPONSE T-CELL SUSP: CPT

## 2021-10-20 LAB
TSPOT INTERPRETATION: NEGATIVE
TSPOT NIL CONTROL INTERPRETATION: NORMAL
TSPOT PANEL A: 0
TSPOT PANEL B: 1
TSPOT POS CONTROL INTERPRETATION: NORMAL

## 2021-10-27 ENCOUNTER — TELEMEDICINE (OUTPATIENT)
Dept: PSYCHIATRY | Facility: CLINIC | Age: 61
End: 2021-10-27

## 2021-10-27 DIAGNOSIS — F33.1 MAJOR DEPRESSIVE DISORDER, RECURRENT EPISODE, MODERATE (HCC): Primary | ICD-10-CM

## 2021-10-27 DIAGNOSIS — R41.840 ATTENTION AND CONCENTRATION DEFICIT: ICD-10-CM

## 2021-10-27 DIAGNOSIS — F43.21 GRIEF: ICD-10-CM

## 2021-10-27 PROBLEM — F32.A DEPRESSION: Status: ACTIVE | Noted: 2021-10-27

## 2021-10-27 PROBLEM — S09.90XA HEAD INJURY: Status: ACTIVE | Noted: 2021-10-27

## 2021-10-27 PROBLEM — F41.9 ANXIETY: Status: ACTIVE | Noted: 2021-10-27

## 2021-10-27 PROBLEM — H91.90 DEAFNESS: Status: ACTIVE | Noted: 2021-10-27

## 2021-10-27 PROCEDURE — 90833 PSYTX W PT W E/M 30 MIN: CPT | Performed by: NURSE PRACTITIONER

## 2021-10-27 PROCEDURE — 99214 OFFICE O/P EST MOD 30 MIN: CPT | Performed by: NURSE PRACTITIONER

## 2021-10-27 NOTE — PROGRESS NOTES
Subjective   Ami Lewis is a 61 y.o. female who presents today for follow up.   Mode of visit: Video  Location of provider: [address], [place] [address].  Location of patient: Home  Does the patient consent to use a video/audio connection for your medical care today? Yes  The visit included audio and video interaction. No technical issues occurred during this visit.     Chief Complaint:  Depression    History of Present Illness:     Depression over the past month- Increase in stress over the past few months, as her father passed away. Mother was diagnosed with cancer. Son has graduated from P.A. school. Patient just started back school. Increased sadness at times.   Sleep- Sleeping okay.   Interest- Find it harder to get excited about things.   Guilt- Denies  Energy- not the best. Low. Feeling worn out.   Concentration- Difficulty concentrating.   Appetite- on saxenda, so has decreased. Has lost about 10lbs with saxenda.   Psychomotor retardation/agitation- Denies  Suicidal thoughts or hopelessness- Denies hopelessness and suicidal.     Anxiety over the past month- no issues    ADHD: Patient has had continued issues with focus and concentration at work and school. Difficulty completing tasks. Endorses forgetfulness and procrastination.     Psychiatric Review of Systems: Denies symptoms of psychosis, fredrick or PTSD      PHQ-9 Depression Screening  PHQ-9 Total Score: 0    Little interest or pleasure in doing things? 0   Feeling down, depressed, or hopeless? 0   Trouble falling or staying asleep, or sleeping too much?     Feeling tired or having little energy?     Poor appetite or overeating?     Feeling bad about yourself - or that you are a failure or have let yourself or your family down?     Trouble concentrating on things, such as reading the newspaper or watching television?     Moving or speaking so slowly that other people could have noticed? Or the opposite - being so fidgety  or restless that you have been moving around a lot more than usual?     Thoughts that you would be better off dead, or of hurting yourself in some way?     PHQ-9 Total Score 0     ALEXA-7  Feeling nervous, anxious or on edge: Not at all  Not being able to stop or control worrying: Not at all  Worrying too much about different things: Not at all  Trouble Relaxing: Not at all  Being so restless that it is hard to sit still: Not at all  Feeling afraid as if something awful might happen: Not at all  Becoming easily annoyed or irritable: Not at all  ALEXA 7 Total Score: 0  If you checked any problems, how difficult have these problems made it for you to do your work, take care of things at home, or get along with other people: Not difficult at all    Past Surgical History:  Past Surgical History:   Procedure Laterality Date   •  SECTION     • CLUB FOOT RELEASE     • TUBAL ABDOMINAL LIGATION         Problem List:  Patient Active Problem List   Diagnosis   • Anxiety   • Deafness   • Depression   • Head injury   • Primary localized osteoarthrosis, ankle and foot       Allergy:   Allergies   Allergen Reactions   • Codeine Itching   • Flexeril [Cyclobenzaprine] Nausea And Vomiting   • Sulfa Antibiotics Hives        Discontinued Medications:  Medications Discontinued During This Encounter   Medication Reason   • Mirabegron ER (Myrbetriq) 50 MG tablet sustained-release 24 hour 24 hr tablet    • buPROPion XL (WELLBUTRIN XL) 150 MG 24 hr tablet Historical Med - Therapy completed       Current Medications:   Current Outpatient Medications   Medication Sig Dispense Refill   • Saxenda 18 MG/3ML injection pen inject 3 mg subcutaneously once in the abdomen, thigh or upper arm 15 mL 2     No current facility-administered medications for this visit.       Past Medical History:  Past Medical History:   Diagnosis Date   • ADHD (attention deficit hyperactivity disorder)    • Anxiety    • Depression        Past Psychiatric  "History:  Medication Trials: Zoloft, wellbutrin      Substance Abuse History:   Types: Denies    Access to Firearms: Denies    Social History     Socioeconomic History   • Marital status:    Tobacco Use   • Smoking status: Current Every Day Smoker     Packs/day: 0.50     Types: Cigarettes   • Smokeless tobacco: Never Used   Vaping Use   • Vaping Use: Never used   Substance and Sexual Activity   • Alcohol use: Never   • Drug use: Never   • Sexual activity: Defer         Family History   Problem Relation Age of Onset   • Breast cancer Mother    • Cervical cancer Mother    • COPD Mother    • Diabetes Paternal Grandmother            Mental Status Exam:     Appearance: good eye contact, normal street clothes, groomed, sitting in chair   Behavior: pleasant and cooperative  Motor: no abnormal  Speech: normal rhythm, rate, volume, tone, not hyperverbal, not pressured, normal prosidy  Mood: \"Alright\"  Affect: euthymic  Thought Content: negative suicidal ideations, negative homicidal ideations, negative obsessions  Perceptions: negative auditory hallucinations, negative visual hallucinations, negative delusions, negative paranoia  Thought Process: goal directed, linear  Insight/Judgement: fair/fair  Cognition: grossly intact  Attention: intact  Orientation: person, place, time and situation  Memory: intact    Review of Systems:     Constitutional: Denies fatigue, night sweats  Eyes: Denies double vision, blurred vision  HENT: Denies vertigo, recent head injury  Cardiovascular: Denies chest pain, irregular heartbeats  Respiratory: Denies productive cough, shortness of breath  Gastrointestinal: Denies nausea, vomiting  Genitourinary: Denies dysuria, urinary retention  Integument: Denies hair growth change, new skin lesions  Neurologic: Denies altered mental status, seizures  Musculoskeletal: Denies joint swelling, limitation of motion  Endocrine: Denies cold intolerance, heat intolerance  Psychiatric: Admits depression, " attention issues. Denies fredrick, post-traumatic stress disorder, obsessive compulsive disorder, psychosis.   Allergic-immunologic: Denies frequent illnesses      Vital Signs:   There were no vitals taken for this visit.     Lab Results:   Lab on 10/18/2021   Component Date Value Ref Range Status   • TSPOTTB 10/18/2021 Negative  Negative Final   • T-SPOT Panel A 10/18/2021 0   Final   • T-SPOT Panel B 10/18/2021 1   Final   • TSPOT NIL CONTROL INTERP 10/18/2021 Passed   Final   • TSPOT POS CONTROL INTERP 10/18/2021 Passed   Final   Admission on 07/14/2021, Discharged on 07/14/2021   Component Date Value Ref Range Status   • Rapid Strep A Screen 07/14/2021 Negative  Negative, VALID, INVALID, Not Performed Final   • Internal Control 07/14/2021 Passed  Passed Final   • Lot Number 07/14/2021 [id number]   Final   • Expiration Date 07/14/2021 33,122   Final   • Rapid Influenza A Ag 07/14/2021 Positive* Negative Final   • Rapid Influenza B Ag 07/14/2021 Negative  Negative Final   • Internal Control 07/14/2021 Passed  Passed Final   • Lot Number 07/14/2021 [id number]   Final   • Expiration Date 07/14/2021 42,822   Final   • SARS Antigen 07/14/2021 Not Detected  Not Detected Final   • Internal Control 07/14/2021 Passed  Passed Final   • Lot Number 07/14/2021 [id number]   Final   • Expiration Date 07/14/2021 10,923   Final   • Throat Culture, Beta Strep 07/14/2021 No Beta Hemolytic Streptococcus Isolated   Final   Conversion Encounter on 04/15/2021   Component Date Value Ref Range Status   • Renal Epithelial Cells 04/15/2021 0   Final   • Epithelial Cells, UA 04/15/2021 0   Final   • Calcium Oxalate Crystals, UA 04/15/2021 0   Final   • Bacteria, UA 04/15/2021 0   Final   • WBC, UA 04/15/2021 0   Final   • RBC, UA 04/15/2021 0   Final   • Leukocytes, UA 04/15/2021 Negative   Final   • Nitrite, UA 04/15/2021 Negative   Final   • Urobilinogen, UA 04/15/2021 0.2   Final   • Protein, UA 04/15/2021 Negative   Final   • pH, UA 04/15/2021  5.5   Final   • Blood, UA 04/15/2021 Moderate   Final   • Specific Gravity, UA 04/15/2021 >=1.030   Final   • Ketones, UA 04/15/2021 Negative   Final   • Bilirubin, UA 04/15/2021 Negative   Final   • Glucose, UA 04/15/2021 Negative   Final   • Appearance 04/15/2021 Clear   Final   • Color, UA 04/15/2021 Yellow   Final       EKG Results:  No orders to display       Imaging Results:  No Images in the past 120 days found..      Assessment/Plan   Diagnoses and all orders for this visit:    1. Major depressive disorder, recurrent episode, moderate (HCC) (Primary)    2. Grief    3. Attention and concentration deficit      Stopped bupropion a few months ago, because of increased restlessness and GI side effects. Discussed the possibility of adding antidepressant, but patient declines at this time.     Has neuropsychological testing for ADHD scheduled. Based on results, may consider treating patient with medication for ADHD. 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 Will obtain results. Denies cardiac history or substance abuse history.     16 minutes of supportive psychotherapy with goal to strengthen defenses, promote problems solving, restore adaptive functioning and provide symptom relief. The therapeutic alliance was strengthened to encourage the patient to express their thoughts and feelings. Esteem building was enhanced through praise, reassurance, normalizing and encouragement. Coping skills were enhanced to build distress tolerance skills and emotional regulation. Patient given education on medication side effects, diagnosis/illness and relapse symptoms. Plan to continue supportive psychotherapy in next appointment to provide symptom relief.     4 weeks    Visit Diagnoses:    ICD-10-CM ICD-9-CM   1. Major depressive disorder, recurrent episode, moderate (HCC)  F33.1 296.32   2. Grief  F43.21 309.0   3. Attention and concentration deficit  R41.840 799.51       PLAN:  1. Safety: No acute safety  concerns.   2. Referral: Neuropsychological testing for ADHD  3. Risk Assessment: Risk of self-harm acutely is moderate.  Risk factors include anxiety disorder, mood disorder, and recent psychosocial stressors (pandemic). Protective factors include no family history, denies access to guns/weapons, no present SI, no history of suicide attempts or self-harm in the past, minimal AODA, healthcare seeking, future orientation, willingness to engage in care.  Risk of self-harm chronically is also moderate, but could be further elevated in the event of treatment noncompliance and/or AODA.  4. Medications: No medication orders at this time. Awaiting results of neuropsychological testing.   5. Labs/studies: Patient to bring in results from urine drug screen   6. Follow-up: 4 weeks    Patient screened positive for depression based on a PHQ-9 score of 0 on 10/27/2021. Follow-up recommendations include: Suicide Risk Assessment performed.         TREATMENT PLAN/GOALS: Continue supportive psychotherapy efforts and medications as indicated. Treatment and medication options discussed during today's visit. Patient ackowledged and verbally consented to continue with current treatment plan and was educated on the importance of compliance with treatment and follow-up appointments.    MEDICATION ISSUES:  JEOVANY reviewed as expected.  Discussed medication options and treatment plan of prescribed medication as well as the risks, benefits, and side effects including potential falls, possible impaired driving and metabolic adversities among others. Patient is agreeable to call the office with any worsening of symptoms or onset of side effects. Patient is agreeable to call 911 or go to the nearest ER should he/she begin having SI/HI. No medication side effects or related complaints today.     MEDS ORDERED DURING VISIT:  No orders of the defined types were placed in this encounter.      Return in about 4 weeks (around 11/24/2021) for Next  scheduled follow up.         This document has been electronically signed by YENNIFER Loyd  October 27, 2021 11:38 EDT      Part of this note may be an electronic transcription/translation of spoken language to printed text using the Dragon Dictation System.

## 2021-11-17 ENCOUNTER — TELEMEDICINE (OUTPATIENT)
Dept: PSYCHIATRY | Facility: CLINIC | Age: 61
End: 2021-11-17

## 2021-11-17 ENCOUNTER — CLINICAL SUPPORT (OUTPATIENT)
Dept: FAMILY MEDICINE CLINIC | Facility: CLINIC | Age: 61
End: 2021-11-17

## 2021-11-17 DIAGNOSIS — Z51.81 MEDICATION MONITORING ENCOUNTER: Primary | ICD-10-CM

## 2021-11-17 DIAGNOSIS — F33.1 MAJOR DEPRESSIVE DISORDER, RECURRENT EPISODE, MODERATE (HCC): Primary | ICD-10-CM

## 2021-11-17 DIAGNOSIS — Z51.81 MEDICATION MONITORING ENCOUNTER: ICD-10-CM

## 2021-11-17 DIAGNOSIS — F43.21 GRIEF: ICD-10-CM

## 2021-11-17 DIAGNOSIS — F90.0 ADHD (ATTENTION DEFICIT HYPERACTIVITY DISORDER), INATTENTIVE TYPE: ICD-10-CM

## 2021-11-17 PROCEDURE — 99214 OFFICE O/P EST MOD 30 MIN: CPT | Performed by: NURSE PRACTITIONER

## 2021-11-17 PROCEDURE — 90833 PSYTX W PT W E/M 30 MIN: CPT | Performed by: NURSE PRACTITIONER

## 2021-11-17 PROCEDURE — 93000 ELECTROCARDIOGRAM COMPLETE: CPT | Performed by: NURSE PRACTITIONER

## 2021-11-17 NOTE — PROGRESS NOTES
Subjective   Ami Lewis is a 61 y.o. female who presents today for follow up.   Mode of visit: Video  Location of provider: [address], [place] [address].  Location of patient: Home  Does the patient consent to use a video/audio connection for your medical care today? Yes  The visit included audio and video interaction. No technical issues occurred during this visit.     Chief Complaint:  Depression    History of Present Illness:     Depression over the past month- has been better. Increase in stress due to school, as she is trying to figure out the computer aspects of school.   Sleep- good  Interest- no issues  Guilt- denies  Energy- low at times  Concentration- continued difficulty focusing and concentrating.   Appetite- no changes  Psychomotor retardation/agitation- denies  Suicidal thoughts or hopelessness- denies hopelessness, si or hi.     Anxiety over the past month- no issues    ADHD: Patient has had continued issues with focus and concentration at work and school. Difficulty completing tasks. Endorses forgetfulness and procrastination.     Patient had evaluation for ADHD on November 4, 2021 by Phill Bustos a licensed clinical psychologist.  Patient was found to be criteria for adult attention deficit hyperactivity disorder, combined.  Symptoms included difficulties with taking college courses, her mind wandering, feeling overwhelmed and maintaining her interest.  On the adult ADHD self-report scale symptom checklist, patient indicated chronic difficulties with attention to details, organizing tasks, avoiding tasks requiring a lot of thoughts and fidgeting and squirming.  Her symptoms highly consistent with ADHD in adults.  Also acknowledged the majority of the listed symptoms of ADHD in the DSM-V.    Psychiatric Review of Systems: Denies symptoms of psychosis, fredrick or PTSD      PHQ-9 Depression Screening  PHQ-9 Total Score:      Little interest or pleasure in doing  things?     Feeling down, depressed, or hopeless?     Trouble falling or staying asleep, or sleeping too much?     Feeling tired or having little energy?     Poor appetite or overeating?     Feeling bad about yourself - or that you are a failure or have let yourself or your family down?     Trouble concentrating on things, such as reading the newspaper or watching television?     Moving or speaking so slowly that other people could have noticed? Or the opposite - being so fidgety or restless that you have been moving around a lot more than usual?     Thoughts that you would be better off dead, or of hurting yourself in some way?     PHQ-9 Total Score       ALEXA-7       Past Surgical History:  Past Surgical History:   Procedure Laterality Date   •  SECTION     • CLUB FOOT RELEASE     • TUBAL ABDOMINAL LIGATION         Problem List:  Patient Active Problem List   Diagnosis   • Anxiety   • Deafness   • Depression   • Head injury   • Primary localized osteoarthrosis, ankle and foot       Allergy:   Allergies   Allergen Reactions   • Codeine Itching   • Flexeril [Cyclobenzaprine] Nausea And Vomiting   • Sulfa Antibiotics Hives        Discontinued Medications:  There are no discontinued medications.    Current Medications:   Current Outpatient Medications   Medication Sig Dispense Refill   • Saxenda 18 MG/3ML injection pen inject 3 mg subcutaneously once in the abdomen, thigh or upper arm 15 mL 2     No current facility-administered medications for this visit.       Past Medical History:  Past Medical History:   Diagnosis Date   • ADHD (attention deficit hyperactivity disorder)    • Anxiety    • Depression        Past Psychiatric History:  Medication Trials: Zoloft, wellbutrin      Substance Abuse History:   Types: Denies    Access to Firearms: Denies    Social History     Socioeconomic History   • Marital status:    Tobacco Use   • Smoking status: Current Every Day Smoker     Packs/day: 0.50     Types:  "Cigarettes   • Smokeless tobacco: Never Used   Vaping Use   • Vaping Use: Never used   Substance and Sexual Activity   • Alcohol use: Never   • Drug use: Never   • Sexual activity: Defer         Family History   Problem Relation Age of Onset   • Breast cancer Mother    • Cervical cancer Mother    • COPD Mother    • Diabetes Paternal Grandmother            Mental Status Exam:     Appearance: good eye contact, normal street clothes, groomed, sitting in chair   Behavior: pleasant and cooperative  Motor: no abnormal  Speech: normal rhythm, rate, volume, tone, not hyperverbal, not pressured, normal prosidy  Mood: \"Okay\"  Affect: euthymic  Thought Content: negative suicidal ideations, negative homicidal ideations, negative obsessions  Perceptions: negative auditory hallucinations, negative visual hallucinations, negative delusions, negative paranoia  Thought Process: goal directed, linear  Insight/Judgement: fair/fair  Cognition: grossly intact  Attention: intact  Orientation: person, place, time and situation  Memory: intact    Review of Systems:     Constitutional: Denies fatigue, night sweats  Eyes: Denies double vision, blurred vision  HENT: Denies vertigo, recent head injury  Cardiovascular: Denies chest pain, irregular heartbeats  Respiratory: Denies productive cough, shortness of breath  Gastrointestinal: Denies nausea, vomiting  Genitourinary: Denies dysuria, urinary retention  Integument: Denies hair growth change, new skin lesions  Neurologic: Denies altered mental status, seizures  Musculoskeletal: Denies joint swelling, limitation of motion  Endocrine: Denies cold intolerance, heat intolerance  Psychiatric: Admits depression, attention issues. Denies fredrick, post-traumatic stress disorder, obsessive compulsive disorder, psychosis.   Allergic-immunologic: Denies frequent illnesses      Vital Signs:   There were no vitals taken for this visit.     Lab Results:   Lab on 10/18/2021   Component Date Value Ref Range " Status   • TSPOTTB 10/18/2021 Negative  Negative Final   • T-SPOT Panel A 10/18/2021 0   Final   • T-SPOT Panel B 10/18/2021 1   Final   • TSPOT NIL CONTROL INTERP 10/18/2021 Passed   Final   • TSPOT POS CONTROL INTERP 10/18/2021 Passed   Final   Admission on 07/14/2021, Discharged on 07/14/2021   Component Date Value Ref Range Status   • Rapid Strep A Screen 07/14/2021 Negative  Negative, VALID, INVALID, Not Performed Final   • Internal Control 07/14/2021 Passed  Passed Final   • Lot Number 07/14/2021 [id number]   Final   • Expiration Date 07/14/2021 33,122   Final   • Rapid Influenza A Ag 07/14/2021 Positive* Negative Final   • Rapid Influenza B Ag 07/14/2021 Negative  Negative Final   • Internal Control 07/14/2021 Passed  Passed Final   • Lot Number 07/14/2021 [id number]   Final   • Expiration Date 07/14/2021 42,822   Final   • SARS Antigen 07/14/2021 Not Detected  Not Detected Final   • Internal Control 07/14/2021 Passed  Passed Final   • Lot Number 07/14/2021 [id number]   Final   • Expiration Date 07/14/2021 10,923   Final   • Throat Culture, Beta Strep 07/14/2021 No Beta Hemolytic Streptococcus Isolated   Final   Conversion Encounter on 04/15/2021   Component Date Value Ref Range Status   • Renal Epithelial Cells 04/15/2021 0   Final   • Epithelial Cells, UA 04/15/2021 0   Final   • Calcium Oxalate Crystals, UA 04/15/2021 0   Final   • Bacteria, UA 04/15/2021 0   Final   • WBC, UA 04/15/2021 0   Final   • RBC, UA 04/15/2021 0   Final   • Leukocytes, UA 04/15/2021 Negative   Final   • Nitrite, UA 04/15/2021 Negative   Final   • Urobilinogen, UA 04/15/2021 0.2   Final   • Protein, UA 04/15/2021 Negative   Final   • pH, UA 04/15/2021 5.5   Final   • Blood, UA 04/15/2021 Moderate   Final   • Specific Gravity, UA 04/15/2021 >=1.030   Final   • Ketones, UA 04/15/2021 Negative   Final   • Bilirubin, UA 04/15/2021 Negative   Final   • Glucose, UA 04/15/2021 Negative   Final   • Appearance 04/15/2021 Clear   Final   • Color,  SALUD 04/15/2021 Yellow   Final       EKG Results:  ECG 12 Lead    (Results Pending)       Imaging Results:  No Images in the past 120 days found..      Assessment/Plan   Diagnoses and all orders for this visit:    1. Major depressive disorder, recurrent episode, moderate (HCC) (Primary)    2. Grief    3. ADHD (attention deficit hyperactivity disorder), inattentive type    4. Medication monitoring encounter  -     ECG 12 Lead; Future      Discussed the possibility of adding antidepressant, but patient declines at this time.     Recent evaluation for ADHD shows patient meets criteria for ADHD. Will obtain baseline EKG. No cardiac history or substance abuse history. Plan to start patient on stimulant.     16 minutes of supportive psychotherapy with goal to strengthen defenses, promote problems solving, restore adaptive functioning and provide symptom relief. The therapeutic alliance was strengthened to encourage the patient to express their thoughts and feelings. Esteem building was enhanced through praise, reassurance, normalizing and encouragement. Coping skills were enhanced to build distress tolerance skills and emotional regulation. Patient given education on medication side effects, diagnosis/illness and relapse symptoms. Plan to continue supportive psychotherapy in next appointment to provide symptom relief.     1 month    Visit Diagnoses:    ICD-10-CM ICD-9-CM   1. Major depressive disorder, recurrent episode, moderate (HCC)  F33.1 296.32   2. Grief  F43.21 309.0   3. ADHD (attention deficit hyperactivity disorder), inattentive type  F90.0 314.00   4. Medication monitoring encounter  Z51.81 V58.83       PLAN:  1. Safety: No acute safety concerns.   2. Referral: None at this time  3. Risk Assessment: Risk of self-harm acutely is moderate.  Risk factors include anxiety disorder, mood disorder, and recent psychosocial stressors (pandemic). Protective factors include no family history, denies access to guns/weapons,  no present SI, no history of suicide attempts or self-harm in the past, minimal AODA, healthcare seeking, future orientation, willingness to engage in care.  Risk of self-harm chronically is also moderate, but could be further elevated in the event of treatment noncompliance and/or AODA.  4. Medications: To start patient on Adderall xr 5mg po qday to target ADHD symptoms. Risks, benefits, side effects discussed with patient including elevated heart rate, elevated blood pressure, irritability, insomnia, sexual dysfunction, appetite suppressing properties, psychosis.  After discussion of these risks and benefits, the patient voiced understanding and agreed to proceed. Controlled substances consent verbally signed. UDS and Jeovany ordered.  5. Labs/studies: UDS 10/20/21 negative  6. Follow-up: 1 month    Patient screened positive for depression based on a PHQ-9 score of 0 on 10/27/2021. Follow-up recommendations include: Suicide Risk Assessment performed.         TREATMENT PLAN/GOALS: Continue supportive psychotherapy efforts and medications as indicated. Treatment and medication options discussed during today's visit. Patient ackowledged and verbally consented to continue with current treatment plan and was educated on the importance of compliance with treatment and follow-up appointments.    MEDICATION ISSUES:  JEOVANY reviewed as expected.  Discussed medication options and treatment plan of prescribed medication as well as the risks, benefits, and side effects including potential falls, possible impaired driving and metabolic adversities among others. Patient is agreeable to call the office with any worsening of symptoms or onset of side effects. Patient is agreeable to call 911 or go to the nearest ER should he/she begin having SI/HI. No medication side effects or related complaints today.     MEDS ORDERED DURING VISIT:  No orders of the defined types were placed in this encounter.      Return in about 1 month  (around 12/17/2021) for Next scheduled follow up.         This document has been electronically signed by YENNIFER Loyd  November 17, 2021 10:36 EST      Part of this note may be an electronic transcription/translation of spoken language to printed text using the Dragon Dictation System.

## 2021-11-19 DIAGNOSIS — F90.0 ADHD (ATTENTION DEFICIT HYPERACTIVITY DISORDER), INATTENTIVE TYPE: Primary | ICD-10-CM

## 2021-11-19 NOTE — PROGRESS NOTES
ADHD evaluation by Licensed Clinical Psychologist shows patient meets criteria for ADHD. Based on this evaluation and my assessment, will start patient on stimulant. Denies any cardiac history or substance abuse history. EKG WNL. UDS negative. CSA signed. Will start on vyvanse 20mg po qday to target ADHD.

## 2021-12-02 DIAGNOSIS — F90.0 ADHD (ATTENTION DEFICIT HYPERACTIVITY DISORDER), INATTENTIVE TYPE: Primary | ICD-10-CM

## 2021-12-17 ENCOUNTER — TELEMEDICINE (OUTPATIENT)
Dept: PSYCHIATRY | Facility: CLINIC | Age: 61
End: 2021-12-17

## 2021-12-17 DIAGNOSIS — F90.0 ADHD (ATTENTION DEFICIT HYPERACTIVITY DISORDER), INATTENTIVE TYPE: Primary | ICD-10-CM

## 2021-12-17 DIAGNOSIS — F90.0 ADHD (ATTENTION DEFICIT HYPERACTIVITY DISORDER), INATTENTIVE TYPE: ICD-10-CM

## 2021-12-17 PROCEDURE — 99213 OFFICE O/P EST LOW 20 MIN: CPT | Performed by: NURSE PRACTITIONER

## 2021-12-17 PROCEDURE — 90833 PSYTX W PT W E/M 30 MIN: CPT | Performed by: NURSE PRACTITIONER

## 2021-12-17 NOTE — PROGRESS NOTES
Subjective   Ami Lewis is a 61 y.o. female who presents today for follow up.   Mode of visit: Video  Location of provider: [address], [place] [address].  Location of patient: Home  Does the patient consent to use a video/audio connection for your medical care today? Yes  The visit included audio and video interaction. No technical issues occurred during this visit.     Chief Complaint:  Depression    History of Present Illness:     ADHD: Able to focus better. Completing tasks better at school and work. Less anxiety. Has raised grades in all of her classes.     11/17/21 Patient had evaluation for ADHD on November 4, 2021 by Phill Bustos a licensed clinical psychologist.  Patient was found to be criteria for adult attention deficit hyperactivity disorder, combined.  Symptoms included difficulties with taking college courses, her mind wandering, feeling overwhelmed and maintaining her interest.  On the adult ADHD self-report scale symptom checklist, patient indicated chronic difficulties with attention to details, organizing tasks, avoiding tasks requiring a lot of thoughts and fidgeting and squirming.  Her symptoms highly consistent with ADHD in adults.  Also acknowledged the majority of the listed symptoms of ADHD in the DSM-V.    Psychiatric Review of Systems: Denies symptoms of psychosis, fredrick or PTSD      PHQ-9 Depression Screening  PHQ-9 Total Score:      Little interest or pleasure in doing things?     Feeling down, depressed, or hopeless?     Trouble falling or staying asleep, or sleeping too much?     Feeling tired or having little energy?     Poor appetite or overeating?     Feeling bad about yourself - or that you are a failure or have let yourself or your family down?     Trouble concentrating on things, such as reading the newspaper or watching television?     Moving or speaking so slowly that other people could have noticed? Or the opposite - being so fidgety or  restless that you have been moving around a lot more than usual?     Thoughts that you would be better off dead, or of hurting yourself in some way?     PHQ-9 Total Score       ALEXA-7       Past Surgical History:  Past Surgical History:   Procedure Laterality Date   •  SECTION     • CLUB FOOT RELEASE     • TUBAL ABDOMINAL LIGATION         Problem List:  Patient Active Problem List   Diagnosis   • Anxiety   • Deafness   • Depression   • Head injury   • Primary localized osteoarthrosis, ankle and foot       Allergy:   Allergies   Allergen Reactions   • Codeine Itching   • Flexeril [Cyclobenzaprine] Nausea And Vomiting   • Sulfa Antibiotics Hives        Discontinued Medications:  Medications Discontinued During This Encounter   Medication Reason   • lisdexamfetamine (Vyvanse) 20 MG capsule Reorder       Current Medications:   Current Outpatient Medications   Medication Sig Dispense Refill   • lisdexamfetamine (Vyvanse) 30 MG capsule Take 1 capsule by mouth Every Morning for 30 days 30 capsule 0   • Saxenda 18 MG/3ML injection pen inject 3 mg subcutaneously once in the abdomen, thigh or upper arm 15 mL 2     No current facility-administered medications for this visit.       Past Medical History:  Past Medical History:   Diagnosis Date   • ADHD (attention deficit hyperactivity disorder)    • Anxiety    • Depression        Past Psychiatric History:  Medication Trials: Zoloft, wellbutrin      Substance Abuse History:   Types: Denies    Access to Firearms: Denies    Social History     Socioeconomic History   • Marital status:    Tobacco Use   • Smoking status: Current Every Day Smoker     Packs/day: 0.50     Types: Cigarettes   • Smokeless tobacco: Never Used   Vaping Use   • Vaping Use: Never used   Substance and Sexual Activity   • Alcohol use: Never   • Drug use: Never   • Sexual activity: Defer         Family History   Problem Relation Age of Onset   • Breast cancer Mother    • Cervical cancer Mother    •  "COPD Mother    • Diabetes Paternal Grandmother            Mental Status Exam:     Appearance: good eye contact, normal street clothes, groomed, sitting in chair   Behavior: pleasant and cooperative  Motor: no abnormal  Speech: normal rhythm, rate, volume, tone, not hyperverbal, not pressured, normal prosidy  Mood: \"Okay\"  Affect: euthymic  Thought Content: negative suicidal ideations, negative homicidal ideations, negative obsessions  Perceptions: negative auditory hallucinations, negative visual hallucinations, negative delusions, negative paranoia  Thought Process: goal directed, linear  Insight/Judgement: fair/fair  Cognition: grossly intact  Attention: intact  Orientation: person, place, time and situation  Memory: intact    Review of Systems:     Constitutional: Denies fatigue, night sweats  Eyes: Denies double vision, blurred vision  HENT: Denies vertigo, recent head injury  Cardiovascular: Denies chest pain, irregular heartbeats  Respiratory: Denies productive cough, shortness of breath  Gastrointestinal: Denies nausea, vomiting  Genitourinary: Denies dysuria, urinary retention  Integument: Denies hair growth change, new skin lesions  Neurologic: Denies altered mental status, seizures  Musculoskeletal: Denies joint swelling, limitation of motion  Endocrine: Denies cold intolerance, heat intolerance  Psychiatric: Admits depression, attention issues. Denies fredrick, post-traumatic stress disorder, obsessive compulsive disorder, psychosis.   Allergic-immunologic: Denies frequent illnesses      Vital Signs:   There were no vitals taken for this visit.     Lab Results:   Lab on 10/18/2021   Component Date Value Ref Range Status   • TSPOTTB 10/18/2021 Negative  Negative Final   • T-SPOT Panel A 10/18/2021 0   Final   • T-SPOT Panel B 10/18/2021 1   Final   • TSPOT NIL CONTROL INTERP 10/18/2021 Passed   Final   • TSPOT POS CONTROL INTERP 10/18/2021 Passed   Final   Admission on 07/14/2021, Discharged on 07/14/2021 "   Component Date Value Ref Range Status   • Rapid Strep A Screen 07/14/2021 Negative  Negative, VALID, INVALID, Not Performed Final   • Internal Control 07/14/2021 Passed  Passed Final   • Lot Number 07/14/2021 [id number]   Final   • Expiration Date 07/14/2021 33,122   Final   • Rapid Influenza A Ag 07/14/2021 Positive* Negative Final   • Rapid Influenza B Ag 07/14/2021 Negative  Negative Final   • Internal Control 07/14/2021 Passed  Passed Final   • Lot Number 07/14/2021 [id number]   Final   • Expiration Date 07/14/2021 42,822   Final   • SARS Antigen 07/14/2021 Not Detected  Not Detected Final   • Internal Control 07/14/2021 Passed  Passed Final   • Lot Number 07/14/2021 [id number]   Final   • Expiration Date 07/14/2021 10,923   Final   • Throat Culture, Beta Strep 07/14/2021 No Beta Hemolytic Streptococcus Isolated   Final   Conversion Encounter on 04/15/2021   Component Date Value Ref Range Status   • Renal Epithelial Cells 04/15/2021 0   Final   • Epithelial Cells, UA 04/15/2021 0   Final   • Calcium Oxalate Crystals, UA 04/15/2021 0   Final   • Bacteria, UA 04/15/2021 0   Final   • WBC, UA 04/15/2021 0   Final   • RBC, UA 04/15/2021 0   Final   • Leukocytes, UA 04/15/2021 Negative   Final   • Nitrite, UA 04/15/2021 Negative   Final   • Urobilinogen, UA 04/15/2021 0.2   Final   • Protein, UA 04/15/2021 Negative   Final   • pH, UA 04/15/2021 5.5   Final   • Blood, UA 04/15/2021 Moderate   Final   • Specific Gravity, UA 04/15/2021 >=1.030   Final   • Ketones, UA 04/15/2021 Negative   Final   • Bilirubin, UA 04/15/2021 Negative   Final   • Glucose, UA 04/15/2021 Negative   Final   • Appearance 04/15/2021 Clear   Final   • Color, UA 04/15/2021 Yellow   Final       EKG Results:  No orders to display       Imaging Results:  No Images in the past 120 days found..      Assessment/Plan   Diagnoses and all orders for this visit:    1. ADHD (attention deficit hyperactivity disorder), inattentive type (Primary)  -      lisdexamfetamine (Vyvanse) 30 MG capsule; Take 1 capsule by mouth Every Morning for 30 days  Dispense: 30 capsule; Refill: 0      Increase lisdexamfetamine to target ADHD. Has tolerated medications well with no side effects. 16 minutes of supportive psychotherapy with goal to strengthen defenses, promote problems solving, restore adaptive functioning and provide symptom relief. The therapeutic alliance was strengthened to encourage the patient to express their thoughts and feelings. Esteem building was enhanced through praise, reassurance, normalizing and encouragement. Coping skills were enhanced to build distress tolerance skills and emotional regulation. Patient given education on medication side effects, diagnosis/illness and relapse symptoms. Plan to continue supportive psychotherapy in next appointment to provide symptom relief. 1 month    Visit Diagnoses:    ICD-10-CM ICD-9-CM   1. ADHD (attention deficit hyperactivity disorder), inattentive type  F90.0 314.00       PLAN:  1. Safety: No acute safety concerns.   2. Referral: None at this time  3. Risk Assessment: Risk of self-harm acutely is moderate.  Risk factors include anxiety disorder, mood disorder, and recent psychosocial stressors (pandemic). Protective factors include no family history, denies access to guns/weapons, no present SI, no history of suicide attempts or self-harm in the past, minimal AODA, healthcare seeking, future orientation, willingness to engage in care.  Risk of self-harm chronically is also moderate, but could be further elevated in the event of treatment noncompliance and/or AODA.  4. Medications: Increase lisdexamfetamine from 20mg to 30mg po qday to target ADHD. Risks, benefits, side effects discussed with patient including elevated heart rate, elevated blood pressure, irritability, insomnia, sexual dysfunction, appetite suppressing properties, psychosis.  After discussion of these risks and benefits, the patient voiced  understanding and agreed to proceed. Controlled substances consent verbally signed. UDS and Jeovany ordered.  5. Labs/studies: UDS 10/20/21 negative  6. Follow-up: 1 month    Patient screened positive for depression based on a PHQ-9 score of 0 on 10/27/2021. Follow-up recommendations include: Suicide Risk Assessment performed.         TREATMENT PLAN/GOALS: Continue supportive psychotherapy efforts and medications as indicated. Treatment and medication options discussed during today's visit. Patient ackowledged and verbally consented to continue with current treatment plan and was educated on the importance of compliance with treatment and follow-up appointments.    MEDICATION ISSUES:  JEOVANY reviewed as expected.  Discussed medication options and treatment plan of prescribed medication as well as the risks, benefits, and side effects including potential falls, possible impaired driving and metabolic adversities among others. Patient is agreeable to call the office with any worsening of symptoms or onset of side effects. Patient is agreeable to call 911 or go to the nearest ER should he/she begin having SI/HI. No medication side effects or related complaints today.     MEDS ORDERED DURING VISIT:  New Medications Ordered This Visit   Medications   • lisdexamfetamine (Vyvanse) 30 MG capsule     Sig: Take 1 capsule by mouth Every Morning for 30 days     Dispense:  30 capsule     Refill:  0       Return in about 1 month (around 1/17/2022) for Next scheduled follow up.         This document has been electronically signed by YENNIFER Loyd  December 17, 2021 11:02 EST      Part of this note may be an electronic transcription/translation of spoken language to printed text using the Dragon Dictation System.

## 2022-01-05 ENCOUNTER — TELEPHONE (OUTPATIENT)
Dept: FAMILY MEDICINE CLINIC | Facility: CLINIC | Age: 62
End: 2022-01-05

## 2022-01-05 NOTE — TELEPHONE ENCOUNTER
Provider: JONNIE JACKSON    Caller: April    Relationship to Patient: PHARMACY    Pharmacy: [facility]    Phone Number: [phone]    Reason for Call: THE PHARMACIST STATED A PRIOR AUTHORIZATION NEEDS TO BE SENT FOR  Saxenda 18 MG/3ML injection penSaxenda 18 MG/3ML injection pen. SHE STATED SHE WOULD LIKE A CALL BACK TO START THIS PROCESS OR IT CAN BE SENT THROUGH [facility].

## 2022-01-06 ENCOUNTER — TELEPHONE (OUTPATIENT)
Dept: FAMILY MEDICINE CLINIC | Facility: CLINIC | Age: 62
End: 2022-01-06

## 2022-01-13 ENCOUNTER — TELEPHONE (OUTPATIENT)
Dept: FAMILY MEDICINE CLINIC | Facility: CLINIC | Age: 62
End: 2022-01-13

## 2022-01-13 NOTE — TELEPHONE ENCOUNTER
Demond COLINDRES, a pharmacist with [facility] is requesting to speak with Royal or Zenobia OTT regarding the patients medication.   He provided me with a PA # [id number]

## 2022-01-18 DIAGNOSIS — F90.0 ADHD (ATTENTION DEFICIT HYPERACTIVITY DISORDER), INATTENTIVE TYPE: ICD-10-CM

## 2022-01-18 NOTE — TELEPHONE ENCOUNTER
Patient says she delayed in setting up her appt with Quincy Yap and now is running short on her Vyvanse.  Would like to know if you will authorize at least 7 pills to hold her until her appt on 01/25/2022.  I have pended Rx for 7 days worth.  Please send to [facility] at the [facility] (here)

## 2022-01-25 ENCOUNTER — TELEMEDICINE (OUTPATIENT)
Dept: PSYCHIATRY | Facility: CLINIC | Age: 62
End: 2022-01-25

## 2022-01-25 DIAGNOSIS — F90.0 ADHD (ATTENTION DEFICIT HYPERACTIVITY DISORDER), INATTENTIVE TYPE: Primary | ICD-10-CM

## 2022-01-25 DIAGNOSIS — Z51.81 MEDICATION MONITORING ENCOUNTER: ICD-10-CM

## 2022-01-25 PROCEDURE — 99213 OFFICE O/P EST LOW 20 MIN: CPT | Performed by: NURSE PRACTITIONER

## 2022-01-25 NOTE — PROGRESS NOTES
Subjective   Ami Lewis is a 61 y.o. female who presents today for follow up.   Mode of visit: Video  Location of provider: [address], [place] [address].  Location of patient: Home  Does the patient consent to use a video/audio connection for your medical care today? Yes  The visit included audio and video interaction. No technical issues occurred during this visit.     Chief Complaint:  Depression    History of Present Illness:     ADHD: Able to focus better. Completing tasks better at school and work. Less anxiety. Has raised grades in all of her classes.     11/17/21 Patient had evaluation for ADHD on November 4, 2021 by Phill Bustos a licensed clinical psychologist.  Patient was found to be criteria for adult attention deficit hyperactivity disorder, combined.  Symptoms included difficulties with taking college courses, her mind wandering, feeling overwhelmed and maintaining her interest.  On the adult ADHD self-report scale symptom checklist, patient indicated chronic difficulties with attention to details, organizing tasks, avoiding tasks requiring a lot of thoughts and fidgeting and squirming.  Her symptoms highly consistent with ADHD in adults.  Also acknowledged the majority of the listed symptoms of ADHD in the DSM-V.    Psychiatric Review of Systems: Denies symptoms of psychosis, fredrick or PTSD      PHQ-9 Depression Screening  PHQ-9 Total Score:      Little interest or pleasure in doing things?     Feeling down, depressed, or hopeless?     Trouble falling or staying asleep, or sleeping too much?     Feeling tired or having little energy?     Poor appetite or overeating?     Feeling bad about yourself - or that you are a failure or have let yourself or your family down?     Trouble concentrating on things, such as reading the newspaper or watching television?     Moving or speaking so slowly that other people could have noticed? Or the opposite - being so fidgety or  restless that you have been moving around a lot more than usual?     Thoughts that you would be better off dead, or of hurting yourself in some way?     PHQ-9 Total Score       ALEXA-7       Past Surgical History:  Past Surgical History:   Procedure Laterality Date   •  SECTION     • CLUB FOOT RELEASE     • TUBAL ABDOMINAL LIGATION         Problem List:  Patient Active Problem List   Diagnosis   • Anxiety   • Deafness   • Depression   • Head injury   • Primary localized osteoarthrosis, ankle and foot       Allergy:   Allergies   Allergen Reactions   • Codeine Itching   • Flexeril [Cyclobenzaprine] Nausea And Vomiting   • Sulfa Antibiotics Hives        Discontinued Medications:  Medications Discontinued During This Encounter   Medication Reason   • amoxicillin (AMOXIL) 500 MG capsule Historical Med - Therapy completed   • lisdexamfetamine (Vyvanse) 30 MG capsule Reorder       Current Medications:   Current Outpatient Medications   Medication Sig Dispense Refill   • lisdexamfetamine (Vyvanse) 30 MG capsule Take 1 capsule by mouth Every Morning for 30 days 30 capsule 0   • Saxenda 18 MG/3ML injection pen inject 3 mg subcutaneously once in the abdomen, thigh or upper arm 15 mL 2     No current facility-administered medications for this visit.       Past Medical History:  Past Medical History:   Diagnosis Date   • ADHD (attention deficit hyperactivity disorder)    • Anxiety    • Depression        Past Psychiatric History:  Medication Trials: Zoloft, wellbutrin      Substance Abuse History:   Types: Denies    Access to Firearms: Denies    Social History     Socioeconomic History   • Marital status:    Tobacco Use   • Smoking status: Current Every Day Smoker     Packs/day: 0.50     Types: Cigarettes   • Smokeless tobacco: Never Used   Vaping Use   • Vaping Use: Never used   Substance and Sexual Activity   • Alcohol use: Never   • Drug use: Never   • Sexual activity: Defer         Family History   Problem  "Relation Age of Onset   • Breast cancer Mother    • Cervical cancer Mother    • COPD Mother    • Diabetes Paternal Grandmother            Mental Status Exam:     Appearance: good eye contact, normal street clothes, groomed, sitting in chair   Behavior: pleasant and cooperative  Motor: no abnormal  Speech: normal rhythm, rate, volume, tone, not hyperverbal, not pressured, normal prosidy  Mood: \"Okay\"  Affect: euthymic  Thought Content: negative suicidal ideations, negative homicidal ideations, negative obsessions  Perceptions: negative auditory hallucinations, negative visual hallucinations, negative delusions, negative paranoia  Thought Process: goal directed, linear  Insight/Judgement: fair/fair  Cognition: grossly intact  Attention: intact  Orientation: person, place, time and situation  Memory: intact    Review of Systems:     Constitutional: Denies fatigue, night sweats  Eyes: Denies double vision, blurred vision  HENT: Denies vertigo, recent head injury  Cardiovascular: Denies chest pain, irregular heartbeats  Respiratory: Denies productive cough, shortness of breath  Gastrointestinal: Denies nausea, vomiting  Genitourinary: Denies dysuria, urinary retention  Integument: Denies hair growth change, new skin lesions  Neurologic: Denies altered mental status, seizures  Musculoskeletal: Denies joint swelling, limitation of motion  Endocrine: Denies cold intolerance, heat intolerance  Psychiatric: Admits depression, attention issues. Denies fredrick, post-traumatic stress disorder, obsessive compulsive disorder, psychosis.   Allergic-immunologic: Denies frequent illnesses      Vital Signs:   There were no vitals taken for this visit.     Lab Results:   Lab on 10/18/2021   Component Date Value Ref Range Status   • TSPOTTB 10/18/2021 Negative  Negative Final   • T-SPOT Panel A 10/18/2021 0   Final   • T-SPOT Panel B 10/18/2021 1   Final   • TSPOT NIL CONTROL INTERP 10/18/2021 Passed   Final   • TSPOT POS CONTROL INTERP " 10/18/2021 Passed   Final   Admission on 07/14/2021, Discharged on 07/14/2021   Component Date Value Ref Range Status   • Rapid Strep A Screen 07/14/2021 Negative  Negative, VALID, INVALID, Not Performed Final   • Internal Control 07/14/2021 Passed  Passed Final   • Lot Number 07/14/2021 [id number]   Final   • Expiration Date 07/14/2021 33,122   Final   • Rapid Influenza A Ag 07/14/2021 Positive* Negative Final   • Rapid Influenza B Ag 07/14/2021 Negative  Negative Final   • Internal Control 07/14/2021 Passed  Passed Final   • Lot Number 07/14/2021 [id number]   Final   • Expiration Date 07/14/2021 42,822   Final   • SARS Antigen 07/14/2021 Not Detected  Not Detected Final   • Internal Control 07/14/2021 Passed  Passed Final   • Lot Number 07/14/2021 [id number]   Final   • Expiration Date 07/14/2021 10,923   Final   • Throat Culture, Beta Strep 07/14/2021 No Beta Hemolytic Streptococcus Isolated   Final   Conversion Encounter on 04/15/2021   Component Date Value Ref Range Status   • Renal Epithelial Cells 04/15/2021 0   Final   • Epithelial Cells, UA 04/15/2021 0   Final   • Calcium Oxalate Crystals, UA 04/15/2021 0   Final   • Bacteria, UA 04/15/2021 0   Final   • WBC, UA 04/15/2021 0   Final   • RBC, UA 04/15/2021 0   Final   • Leukocytes, UA 04/15/2021 Negative   Final   • Nitrite, UA 04/15/2021 Negative   Final   • Urobilinogen, UA 04/15/2021 0.2   Final   • Protein, UA 04/15/2021 Negative   Final   • pH, UA 04/15/2021 5.5   Final   • Blood, UA 04/15/2021 Moderate   Final   • Specific Gravity, UA 04/15/2021 >=1.030   Final   • Ketones, UA 04/15/2021 Negative   Final   • Bilirubin, UA 04/15/2021 Negative   Final   • Glucose, UA 04/15/2021 Negative   Final   • Appearance 04/15/2021 Clear   Final   • Color, UA 04/15/2021 Yellow   Final       EKG Results:  No orders to display       Imaging Results:  No Images in the past 120 days found..      Assessment/Plan   Diagnoses and all orders for this visit:    1. ADHD (attention  deficit hyperactivity disorder), inattentive type (Primary)  -     lisdexamfetamine (Vyvanse) 30 MG capsule; Take 1 capsule by mouth Every Morning for 30 days  Dispense: 30 capsule; Refill: 0    2. Medication monitoring encounter  -     Urine Drug Screen - Urine, Clean Catch; Future      Continue lisdexamfetamine to target ADHD. Has tolerated medications well with no side effects. 5 minutes of supportive psychotherapy with goal to strengthen defenses, promote problems solving, restore adaptive functioning and provide symptom relief. The therapeutic alliance was strengthened to encourage the patient to express their thoughts and feelings. Esteem building was enhanced through praise, reassurance, normalizing and encouragement. Coping skills were enhanced to build distress tolerance skills and emotional regulation. Patient given education on medication side effects, diagnosis/illness and relapse symptoms. Plan to continue supportive psychotherapy in next appointment to provide symptom relief. 3 month    Visit Diagnoses:    ICD-10-CM ICD-9-CM   1. ADHD (attention deficit hyperactivity disorder), inattentive type  F90.0 314.00   2. Medication monitoring encounter  Z51.81 V58.83       PLAN:  1. Safety: No acute safety concerns.   2. Referral: None at this time  3. Risk Assessment: Risk of self-harm acutely is moderate.  Risk factors include anxiety disorder, mood disorder, and recent psychosocial stressors (pandemic). Protective factors include no family history, denies access to guns/weapons, no present SI, no history of suicide attempts or self-harm in the past, minimal AODA, healthcare seeking, future orientation, willingness to engage in care.  Risk of self-harm chronically is also moderate, but could be further elevated in the event of treatment noncompliance and/or AODA.  4. Medications: Continue lisdexamfetamine 30mg po qday to target ADHD. Risks, benefits, side effects discussed with patient including elevated  heart rate, elevated blood pressure, irritability, insomnia, sexual dysfunction, appetite suppressing properties, psychosis.  After discussion of these risks and benefits, the patient voiced understanding and agreed to proceed. Controlled substances consent verbally signed. UDS and Jeovany ordered.  5. Labs/studies: UDS 10/20/21 negative  6. Follow-up: 3 month    Patient screened positive for depression based on a PHQ-9 score of 0 on 10/27/2021. Follow-up recommendations include: Suicide Risk Assessment performed.         TREATMENT PLAN/GOALS: Continue supportive psychotherapy efforts and medications as indicated. Treatment and medication options discussed during today's visit. Patient ackowledged and verbally consented to continue with current treatment plan and was educated on the importance of compliance with treatment and follow-up appointments.    MEDICATION ISSUES:  JEOVANY reviewed as expected.  Discussed medication options and treatment plan of prescribed medication as well as the risks, benefits, and side effects including potential falls, possible impaired driving and metabolic adversities among others. Patient is agreeable to call the office with any worsening of symptoms or onset of side effects. Patient is agreeable to call 911 or go to the nearest ER should he/she begin having SI/HI. No medication side effects or related complaints today.     MEDS ORDERED DURING VISIT:  New Medications Ordered This Visit   Medications   • lisdexamfetamine (Vyvanse) 30 MG capsule     Sig: Take 1 capsule by mouth Every Morning for 30 days     Dispense:  30 capsule     Refill:  0       Return in about 3 months (around 4/25/2022) for Next scheduled follow up.         This document has been electronically signed by YENNIFER Loyd  January 25, 2022 14:57 EST      Part of this note may be an electronic transcription/translation of spoken language to printed text using the Dragon Dictation System.

## 2022-02-10 ENCOUNTER — OFFICE VISIT (OUTPATIENT)
Dept: FAMILY MEDICINE CLINIC | Facility: CLINIC | Age: 62
End: 2022-02-10

## 2022-02-10 VITALS
OXYGEN SATURATION: 98 % | BODY MASS INDEX: 35.27 KG/M2 | WEIGHT: 238.1 LBS | DIASTOLIC BLOOD PRESSURE: 71 MMHG | RESPIRATION RATE: 16 BRPM | SYSTOLIC BLOOD PRESSURE: 121 MMHG | TEMPERATURE: 98.6 F | HEIGHT: 69 IN | HEART RATE: 89 BPM

## 2022-02-10 DIAGNOSIS — H93.8X3 SENSATION OF FULLNESS IN BOTH EARS: ICD-10-CM

## 2022-02-10 DIAGNOSIS — G44.201 ACUTE INTRACTABLE TENSION-TYPE HEADACHE: ICD-10-CM

## 2022-02-10 DIAGNOSIS — R09.81 SINUS CONGESTION: ICD-10-CM

## 2022-02-10 DIAGNOSIS — J02.9 SORE THROAT: Primary | ICD-10-CM

## 2022-02-10 LAB
EXPIRATION DATE: NORMAL
INTERNAL CONTROL: NORMAL
Lab: NORMAL
S PYO AG THROAT QL: NEGATIVE

## 2022-02-10 PROCEDURE — 87880 STREP A ASSAY W/OPTIC: CPT

## 2022-02-10 PROCEDURE — 99213 OFFICE O/P EST LOW 20 MIN: CPT

## 2022-02-10 RX ORDER — PREDNISONE 20 MG/1
20 TABLET ORAL DAILY
Qty: 5 TABLET | Refills: 0 | Status: SHIPPED | OUTPATIENT
Start: 2022-02-10 | End: 2022-04-01

## 2022-02-10 RX ORDER — FLUCONAZOLE 150 MG/1
150 TABLET ORAL ONCE
Qty: 1 TABLET | Refills: 0 | Status: SHIPPED | OUTPATIENT
Start: 2022-02-10 | End: 2022-02-15

## 2022-02-10 RX ORDER — AMOXICILLIN 500 MG/1
500 CAPSULE ORAL 2 TIMES DAILY
Qty: 14 CAPSULE | Refills: 0 | Status: SHIPPED | OUTPATIENT
Start: 2022-02-10 | End: 2022-04-01

## 2022-02-10 NOTE — PROGRESS NOTES
"Ami Lewis presents to [facility] with complaints of bilateral ear fullness, sinus congestion and pain, swollen lymph nodes.      History of Present Illness  This is a 61-year-old female who presents to the clinic with complaints of bilateral ear fullness, sinus congestion and pain, and swollen lymph nodes.  Patient states that her symptoms started approximately 2 to 3 weeks ago, initially started with a headache with associated sinus pressure.  Patient states that she gets the symptoms typically yearly, and is pretty prone to getting strep throat.  Patient also states that she has had some swollen lymph nodes, feels extremely tired, and also has some ear fullness and pain.  Patient does also admit to a sore throat, no cough.  Patient denies any other symptoms, no GI symptoms, no shortness of breath, no chest pain.  She is currently working in the ER, and does know that she is been exposed likely to Covid, but states that she does not feel though that is what is causing her symptoms and does not wish to be tested.    The following portions of the patient's history were personally reviewed and updated as appropriate: allergies, current medications, past medical history, past surgical history, past family history, and past social history.       Objective   Vital Signs:   /71   Pulse 89   Temp 98.6 °F (37 °C)   Resp 16   Ht 175.3 cm (69\")   Wt 108 kg (238 lb 1.6 oz)   SpO2 98%   BMI 35.16 kg/m²     Body mass index is 35.16 kg/m².    All labs, imaging, test results, and specialty provider notes reviewed with patient.     Physical Exam  Vitals reviewed.   Constitutional:       Appearance: Normal appearance.   HENT:      Right Ear: Tympanic membrane is erythematous and bulging.      Left Ear: Tympanic membrane is erythematous and bulging.      Nose:      Right Sinus: Maxillary sinus tenderness and frontal sinus tenderness present.      Left Sinus: Maxillary sinus " tenderness and frontal sinus tenderness present.      Mouth/Throat:      Pharynx: Posterior oropharyngeal erythema present. No oropharyngeal exudate.   Cardiovascular:      Rate and Rhythm: Normal rate and regular rhythm.      Pulses: Normal pulses.      Heart sounds: Normal heart sounds.   Pulmonary:      Effort: Pulmonary effort is normal.      Breath sounds: Normal breath sounds.   Neurological:      General: No focal deficit present.      Mental Status: She is alert and oriented to person, place, and time.          Assessment and Plan:  Diagnoses and all orders for this visit:    1. Sore throat (Primary)  -     POCT rapid strep A  -     amoxicillin (AMOXIL) 500 MG capsule; Take 1 capsule by mouth 2 (Two) Times a Day.  Dispense: 14 capsule; Refill: 0  -     fluconazole (Diflucan) 150 MG tablet; Take 1 tablet by mouth 1 (One) Time for 1 dose.  Dispense: 1 tablet; Refill: 0  -     predniSONE (DELTASONE) 20 MG tablet; Take 1 tablet by mouth Daily.  Dispense: 5 tablet; Refill: 0    2. Sensation of fullness in both ears  -     amoxicillin (AMOXIL) 500 MG capsule; Take 1 capsule by mouth 2 (Two) Times a Day.  Dispense: 14 capsule; Refill: 0  -     fluconazole (Diflucan) 150 MG tablet; Take 1 tablet by mouth 1 (One) Time for 1 dose.  Dispense: 1 tablet; Refill: 0  -     predniSONE (DELTASONE) 20 MG tablet; Take 1 tablet by mouth Daily.  Dispense: 5 tablet; Refill: 0    3. Sinus congestion  -     amoxicillin (AMOXIL) 500 MG capsule; Take 1 capsule by mouth 2 (Two) Times a Day.  Dispense: 14 capsule; Refill: 0  -     fluconazole (Diflucan) 150 MG tablet; Take 1 tablet by mouth 1 (One) Time for 1 dose.  Dispense: 1 tablet; Refill: 0  -     predniSONE (DELTASONE) 20 MG tablet; Take 1 tablet by mouth Daily.  Dispense: 5 tablet; Refill: 0    4. Acute intractable tension-type headache  -     amoxicillin (AMOXIL) 500 MG capsule; Take 1 capsule by mouth 2 (Two) Times a Day.  Dispense: 14 capsule; Refill: 0  -     fluconazole  (Diflucan) 150 MG tablet; Take 1 tablet by mouth 1 (One) Time for 1 dose.  Dispense: 1 tablet; Refill: 0  -     predniSONE (DELTASONE) 20 MG tablet; Take 1 tablet by mouth Daily.  Dispense: 5 tablet; Refill: 0      We will treat patient for sinus infection/ear infection.  We will give her a course of amoxicillin as well as 5 days of prednisone.  Patient also is likely to get yeast infections, so we will give her a dose of Diflucan for that.  Also instructed patient to obtain some Mucinex that she can take over-the-counter, instructed her to take that for the next week.  Patient to continue taking ibuprofen as well.  If symptoms do not improve after full course of antibiotics and steroids, instructed patient to follow back up with us.  We also tested her for strep, if positive, amoxicillin will cover for this.    Follow Up:  No follow-ups on file.    Patient was given instructions and counseling regarding her condition or for health maintenance advice. Please see specific information pulled into the AVS if appropriate.

## 2022-02-17 ENCOUNTER — OFFICE VISIT (OUTPATIENT)
Dept: PODIATRY | Facility: CLINIC | Age: 62
End: 2022-02-17

## 2022-02-17 VITALS
DIASTOLIC BLOOD PRESSURE: 76 MMHG | TEMPERATURE: 96.9 F | OXYGEN SATURATION: 96 % | BODY MASS INDEX: 36.11 KG/M2 | WEIGHT: 243.8 LBS | SYSTOLIC BLOOD PRESSURE: 140 MMHG | HEART RATE: 101 BPM | HEIGHT: 69 IN

## 2022-02-17 DIAGNOSIS — L84 CALLUS OF FOOT: ICD-10-CM

## 2022-02-17 DIAGNOSIS — M20.42 HAMMER TOE OF LEFT FOOT: ICD-10-CM

## 2022-02-17 DIAGNOSIS — M79.672 FOOT PAIN, LEFT: Primary | ICD-10-CM

## 2022-02-17 PROCEDURE — 11055 PARING/CUTG B9 HYPRKER LES 1: CPT | Performed by: PODIATRIST

## 2022-02-17 PROCEDURE — 99203 OFFICE O/P NEW LOW 30 MIN: CPT | Performed by: PODIATRIST

## 2022-02-17 NOTE — PROGRESS NOTES
[facility] - PODIATRY    Today's Date: 22    Patient Name: Ami Lewis  MRN: [id number]  CSN: [id number]  PCP: Royal Beard APRN, Last PCP Visit:  2022  Referring Provider: Referring, Self    SUBJECTIVE     Chief Complaint   Patient presents with   • Left Foot - Callouses     HPI: Ami Lewis, a 61 y.o.female, comes presents to clinic with chief complaint of:    New, Established, New Problem: New    Location:  hyperkeratotic lesion(s) on medial left fifth toe    Duration: Greater than 5 years    Onset:  gradual    Nature:  sore    Stable, worsening, improving: Recurring    Aggravating factors:  Patient reports lesion(s) is painful with shoegear and ambulation.      Previous Treatment:   Debridement    Past Medical History:   Diagnosis Date   • ADHD (attention deficit hyperactivity disorder)    • Anxiety    • Depression      Past Surgical History:   Procedure Laterality Date   •  SECTION     • CLUB FOOT RELEASE     • TUBAL ABDOMINAL LIGATION       Family History   Problem Relation Age of Onset   • Breast cancer Mother    • Cervical cancer Mother    • COPD Mother    • Diabetes Paternal Grandmother      Social History     Socioeconomic History   • Marital status:    Tobacco Use   • Smoking status: Current Every Day Smoker     Packs/day: 0.50     Years: 40.00     Pack years: 20.00     Types: Cigarettes   • Smokeless tobacco: Never Used   Vaping Use   • Vaping Use: Never used   Substance and Sexual Activity   • Alcohol use: Never   • Drug use: Never   • Sexual activity: Defer     Allergies   Allergen Reactions   • Codeine Itching   • Flexeril [Cyclobenzaprine] Nausea And Vomiting   • Sulfa Antibiotics Hives     Current Outpatient Medications   Medication Sig Dispense Refill   • amoxicillin (AMOXIL) 500 MG capsule Take 1 capsule by mouth 2 (Two) Times a Day. 14 capsule 0   • lisdexamfetamine (Vyvanse) 30 MG capsule Take 1 capsule by mouth Every Morning for 30 days 30  capsule 0   • predniSONE (DELTASONE) 20 MG tablet Take 1 tablet by mouth Daily. 5 tablet 0   • Saxenda 18 MG/3ML injection pen inject 3 mg subcutaneously once in the abdomen, thigh or upper arm 15 mL 2     No current facility-administered medications for this visit.     Review of Systems   Musculoskeletal:        Hyperkeratotic lesion   All other systems reviewed and are negative.      OBJECTIVE     Vitals:    02/17/22 0937   BP: 140/76   Pulse: 101   Temp: 96.9 °F (36.1 °C)   SpO2: 96%       Body mass index is 36 kg/m².    Lab Results   Component Value Date    GLUCOSE 121 (H) 12/17/2020    CALCIUM 9.6 12/17/2020     12/17/2020    K 3.9 12/17/2020    CO2 24 12/17/2020     12/17/2020    BUN 18 12/17/2020    CREATININE 0.73 12/17/2020    BCR 25 (H) 12/17/2020    ANIONGAP 14 12/17/2020       Patient seen in no apparent distress.      PHYSICAL EXAM:     Foot/Ankle Exam:       General:   Appearance: obesity    Orientation: AAOx3    Affect: appropriate    Gait: unimpaired    Shoe Gear:  Casual shoes    VASCULAR      Right Foot Vascularity   Normal vascular exam    Dorsalis pedis:  2+  Posterior tibial:  2+  Skin Temperature: warm    Edema Grading:  None  CFT:  < 3 seconds  Pedal Hair Growth:  Present  Varicosities: none       Left Foot Vascularity   Normal vascular exam    Dorsalis pedis:  2+  Posterior tibial:  2+  Skin Temperature: warm    Edema Grading:  None  CFT:  < 3 seconds  Pedal Hair Growth:  Present  Varicosities: none        NEUROLOGIC     Right Foot Neurologic   Normal sensation    Light touch sensation:  Normal  Vibratory sensation:  Normal  Hot/Cold sensation: normal    Protective Sensation using [place] Monofilament:  10     Left Foot Neurologic   Normal sensation    Light touch sensation:  Normal  Vibratory sensation:  Normal  Hot/cold sensation: normal    Protective Sensation using [place] Monofilament:  10     MUSCULOSKELETAL      Left Foot Musculoskeletal   Hammertoe:   Fourth toe and fifth toe     MUSCLE STRENGTH     Right Foot Muscle Strength   Normal strength    Foot dorsiflexion:  5  Foot plantar flexion:  5  Foot inversion:  5  Foot eversion:  5     Left Foot Muscle Strength   Foot dorsiflexion:  5  Foot plantar flexion:  5  Foot inversion:  5  Foot eversion:  5     RANGE OF MOTION      Right Foot Range of Motion   Foot and ankle ROM within normal limits       Left Foot Range of Motion   Foot and ankle ROM within normal limits       DERMATOLOGIC     Right Foot Dermatologic   Skin: skin intact    Nails: normal       Left Foot Dermatologic   Skin: corn    Nails: normal        Left Foot Additional Comments: Hyperkeratotic lesion on medial left fifth toe.  Partial thickness debridement with #10 scalpel blade down to health tissue.  No signs of edema, erythema, lymphangitis, nor signs of infection.            ASSESSMENT/PLAN     Diagnoses and all orders for this visit:    1. Foot pain, left (Primary)    2. Callus of foot    3. Hammer toe of left foot        Comprehensive lower extremity examination and evaluation was performed.    Discussed findings and treatment plan including risks, benefits, and treatment options with patient in detail. Patient agreed with treatment plan.    Padding dispensed to off-weight pressure area.  Patient was instructed on proper use of the padding.  They state understanding and agreement with using the dispensed padding.    An After Visit Summary was printed and given to the patient at discharge, including (if requested) any available informative/educational handouts regarding diagnosis, treatment, or medications. All questions were answered to patient/family satisfaction. Should symptoms fail to improve or worsen they agree to call or return to clinic or to go to the Emergency Department. Discussed the importance of following up with any needed screening tests/labs/specialist appointments and any requested follow-up recommended by me today. Importance  of maintaining follow-up discussed and patient accepts that missed appointments can delay diagnosis and potentially lead to worsening of conditions.    Return if symptoms worsen or fail to improve., or sooner if acute issues arise.    This document has been electronically signed by Cornelio Acosta DPM on February 17, 2022 10:11 EST

## 2022-02-27 DIAGNOSIS — F90.0 ADHD (ATTENTION DEFICIT HYPERACTIVITY DISORDER), INATTENTIVE TYPE: ICD-10-CM

## 2022-02-28 DIAGNOSIS — F90.0 ADHD (ATTENTION DEFICIT HYPERACTIVITY DISORDER), INATTENTIVE TYPE: ICD-10-CM

## 2022-03-02 ENCOUNTER — TELEPHONE (OUTPATIENT)
Dept: PSYCHIATRY | Facility: CLINIC | Age: 62
End: 2022-03-02

## 2022-03-02 DIAGNOSIS — F90.0 ADHD (ATTENTION DEFICIT HYPERACTIVITY DISORDER), INATTENTIVE TYPE: ICD-10-CM

## 2022-03-02 DIAGNOSIS — F90.0 ADHD (ATTENTION DEFICIT HYPERACTIVITY DISORDER), INATTENTIVE TYPE: Primary | ICD-10-CM

## 2022-03-02 RX ORDER — LISDEXAMFETAMINE DIMESYLATE 30 MG/1
30 CAPSULE ORAL EVERY MORNING
Qty: 30 CAPSULE | Refills: 0 | Status: CANCELLED | OUTPATIENT
Start: 2022-03-02 | End: 2022-04-01

## 2022-03-02 NOTE — TELEPHONE ENCOUNTER
Patient returned Quincy Yap's call and said for Quincy to send in the Adderall to the [facility] pharmacy.  Patient says she really needs that medication to complete her testing March 3,2022.  Please send Adderall

## 2022-03-02 NOTE — TELEPHONE ENCOUNTER
Patient called and said that her prescription for Vyvanse is 300 dollars WITH the savings coupon.  The pharmacist suggested Adderall to her as a cheaper alternative.  She wants to know if she can get that changed to something else

## 2022-03-03 RX ORDER — DEXTROAMPHETAMINE SACCHARATE, AMPHETAMINE ASPARTATE MONOHYDRATE, DEXTROAMPHETAMINE SULFATE AND AMPHETAMINE SULFATE 1.25; 1.25; 1.25; 1.25 MG/1; MG/1; MG/1; MG/1
5 CAPSULE, EXTENDED RELEASE ORAL DAILY
Qty: 30 CAPSULE | Refills: 0 | Status: SHIPPED | OUTPATIENT
Start: 2022-03-03 | End: 2022-04-01 | Stop reason: SDUPTHER

## 2022-03-03 NOTE — TELEPHONE ENCOUNTER
Insurance not covering vyvanse, so patient would like to switch to medication more cost friendly. Will stop vyvanse and start adderall xr 5mg po qday to target inattention. Risks, benefits, side effects discussed with patient including elevated heart rate, elevated blood pressure, irritability, insomnia, sexual dysfunction, appetite suppressing properties, psychosis.  After discussion of these risks and benefits, the patient voiced understanding and agreed to proceed. UDS ordered, Irvin reviewed.

## 2022-03-31 ENCOUNTER — TELEPHONE (OUTPATIENT)
Dept: PSYCHIATRY | Facility: CLINIC | Age: 62
End: 2022-03-31

## 2022-04-01 ENCOUNTER — OFFICE VISIT (OUTPATIENT)
Dept: PSYCHIATRY | Facility: CLINIC | Age: 62
End: 2022-04-01

## 2022-04-01 VITALS
WEIGHT: 244 LBS | DIASTOLIC BLOOD PRESSURE: 82 MMHG | SYSTOLIC BLOOD PRESSURE: 147 MMHG | HEIGHT: 69 IN | BODY MASS INDEX: 36.14 KG/M2

## 2022-04-01 DIAGNOSIS — F90.0 ADHD (ATTENTION DEFICIT HYPERACTIVITY DISORDER), INATTENTIVE TYPE: Primary | ICD-10-CM

## 2022-04-01 PROCEDURE — 99213 OFFICE O/P EST LOW 20 MIN: CPT | Performed by: NURSE PRACTITIONER

## 2022-04-01 RX ORDER — DEXTROAMPHETAMINE SACCHARATE, AMPHETAMINE ASPARTATE MONOHYDRATE, DEXTROAMPHETAMINE SULFATE AND AMPHETAMINE SULFATE 3.75; 3.75; 3.75; 3.75 MG/1; MG/1; MG/1; MG/1
15 CAPSULE, EXTENDED RELEASE ORAL DAILY
Qty: 30 CAPSULE | Refills: 0 | Status: SHIPPED | OUTPATIENT
Start: 2022-04-01 | End: 2022-05-02 | Stop reason: SDUPTHER

## 2022-04-01 NOTE — PROGRESS NOTES
Subjective   Ami Lewis is a 61 y.o. female who presents today for follow up.   This provider is located at [address] [place] [address], [address] in [place], [place]. In-person visit consisting of the patient and I only. The patient is accepting of and agreeable to this appointment.      Chief Complaint:  ADHD    History of Present Illness:     ADHD trouble focusing and concentrating. Difficulty completing tasks. Has struggled with school work and tasks at her workplace.     11/17/21 Patient had evaluation for ADHD on November 4, 2021 by Phill Bustos a licensed clinical psychologist.  Patient was found to be criteria for adult attention deficit hyperactivity disorder, combined.  Symptoms included difficulties with taking college courses, her mind wandering, feeling overwhelmed and maintaining her interest.  On the adult ADHD self-report scale symptom checklist, patient indicated chronic difficulties with attention to details, organizing tasks, avoiding tasks requiring a lot of thoughts and fidgeting and squirming.  Her symptoms highly consistent with ADHD in adults.  Also acknowledged the majority of the listed symptoms of ADHD in the DSM-V.    Psychiatric Review of Systems: Denies symptoms of psychosis, fredrick or PTSD      PHQ-9 Depression Screening  PHQ-9 Total Score:      Little interest or pleasure in doing things?     Feeling down, depressed, or hopeless?     Trouble falling or staying asleep, or sleeping too much?     Feeling tired or having little energy?     Poor appetite or overeating?     Feeling bad about yourself - or that you are a failure or have let yourself or your family down?     Trouble concentrating on things, such as reading the newspaper or watching television?     Moving or speaking so slowly that other people could have noticed? Or the opposite - being so fidgety or restless that you have been moving around a lot more than usual?     Thoughts that you would be better off dead, or of  hurting yourself in some way?     PHQ-9 Total Score       ALEXA-7       Past Surgical History:  Past Surgical History:   Procedure Laterality Date   •  SECTION     • CLUB FOOT RELEASE     • TUBAL ABDOMINAL LIGATION         Problem List:  Patient Active Problem List   Diagnosis   • Anxiety   • Deafness   • Depression   • Head injury   • Primary localized osteoarthrosis, ankle and foot       Allergy:   Allergies   Allergen Reactions   • Codeine Itching   • Flexeril [Cyclobenzaprine] Nausea And Vomiting   • Sulfa Antibiotics Hives        Discontinued Medications:  Medications Discontinued During This Encounter   Medication Reason   • amoxicillin (AMOXIL) 500 MG capsule *Therapy completed   • predniSONE (DELTASONE) 20 MG tablet *Therapy completed   • Saxenda 18 MG/3ML injection pen *Therapy completed   • amphetamine-dextroamphetamine XR (Adderall XR) 5 MG 24 hr capsule Duplicate order       Current Medications:   Current Outpatient Medications   Medication Sig Dispense Refill   • amphetamine-dextroamphetamine XR (Adderall XR) 15 MG 24 hr capsule Take 1 capsule by mouth Daily for 30 days 30 capsule 0     No current facility-administered medications for this visit.       Past Medical History:  Past Medical History:   Diagnosis Date   • ADHD (attention deficit hyperactivity disorder)    • Anxiety    • Depression        Past Psychiatric History:  Medication Trials: Zoloft, wellbutrin      Substance Abuse History:   Types: Denies    Access to Firearms: Denies    Social History     Socioeconomic History   • Marital status:    Tobacco Use   • Smoking status: Current Every Day Smoker     Packs/day: 0.50     Years: 40.00     Pack years: 20.00     Types: Cigarettes   • Smokeless tobacco: Never Used   Vaping Use   • Vaping Use: Never used   Substance and Sexual Activity   • Alcohol use: Never   • Drug use: Never   • Sexual activity: Defer         Family History   Problem Relation Age of Onset   • Breast cancer Mother  "   • Cervical cancer Mother    • COPD Mother    • Diabetes Paternal Grandmother            Mental Status Exam:     Appearance: good eye contact, normal street clothes, groomed, sitting in chair   Behavior: pleasant and cooperative  Motor: no abnormal  Speech: normal rhythm, rate, volume, tone, not hyperverbal, not pressured, normal prosidy  Mood: \"Alright\"  Affect: euthymic  Thought Content: negative suicidal ideations, negative homicidal ideations, negative obsessions  Perceptions: negative auditory hallucinations, negative visual hallucinations, negative delusions, negative paranoia  Thought Process: goal directed, linear  Insight/Judgement: fair/fair  Cognition: grossly intact  Attention: intact  Orientation: person, place, time and situation  Memory: intact    Review of Systems:     Constitutional: Denies fatigue, night sweats  Eyes: Denies double vision, blurred vision  HENT: Denies vertigo, recent head injury  Cardiovascular: Denies chest pain, irregular heartbeats  Respiratory: Denies productive cough, shortness of breath  Gastrointestinal: Denies nausea, vomiting  Genitourinary: Denies dysuria, urinary retention  Integument: Denies hair growth change, new skin lesions  Neurologic: Denies altered mental status, seizures  Musculoskeletal: Denies joint swelling, limitation of motion  Endocrine: Denies cold intolerance, heat intolerance  Psychiatric: Admits depression, attention issues. Denies fredrick, post-traumatic stress disorder, obsessive compulsive disorder, psychosis.   Allergic-immunologic: Denies frequent illnesses      Vital Signs:   /82   Ht 175.3 cm (69\")   Wt 111 kg (244 lb)   BMI 36.03 kg/m²      Lab Results:   Office Visit on 02/10/2022   Component Date Value Ref Range Status   • Rapid Strep A Screen 02/10/2022 Negative  Negative, VALID, INVALID, Not Performed Final   • Internal Control 02/10/2022 Passed  Passed Final   • Lot Number 02/10/2022 [id number]   Final   • Expiration Date 02/10/2022 " 7/31/2023   Final   Lab on 10/18/2021   Component Date Value Ref Range Status   • TSPOTTB 10/18/2021 Negative  Negative Final   • T-SPOT Panel A 10/18/2021 0   Final   • T-SPOT Panel B 10/18/2021 1   Final   • TSPOT NIL CONTROL INTERP 10/18/2021 Passed   Final   • TSPOT POS CONTROL INTERP 10/18/2021 Passed   Final   Admission on 07/14/2021, Discharged on 07/14/2021   Component Date Value Ref Range Status   • Rapid Strep A Screen 07/14/2021 Negative  Negative, VALID, INVALID, Not Performed Final   • Internal Control 07/14/2021 Passed  Passed Final   • Lot Number 07/14/2021 [id number]   Final   • Expiration Date 07/14/2021 33,122   Final   • Rapid Influenza A Ag 07/14/2021 Positive (A) Negative Final   • Rapid Influenza B Ag 07/14/2021 Negative  Negative Final   • Internal Control 07/14/2021 Passed  Passed Final   • Lot Number 07/14/2021 [id number]   Final   • Expiration Date 07/14/2021 42,822   Final   • SARS Antigen 07/14/2021 Not Detected  Not Detected Final   • Internal Control 07/14/2021 Passed  Passed Final   • Lot Number 07/14/2021 [id number]   Final   • Expiration Date 07/14/2021 10,923   Final   • Throat Culture, Beta Strep 07/14/2021 No Beta Hemolytic Streptococcus Isolated   Final   Conversion Encounter on 04/15/2021   Component Date Value Ref Range Status   • Renal Epithelial Cells 04/15/2021 0   Final   • Epithelial Cells, UA 04/15/2021 0   Final   • Calcium Oxalate Crystals, UA 04/15/2021 0   Final   • Bacteria, UA 04/15/2021 0   Final   • WBC, UA 04/15/2021 0   Final   • RBC, UA 04/15/2021 0   Final   • Leukocytes, UA 04/15/2021 Negative   Final   • Nitrite, UA 04/15/2021 Negative   Final   • Urobilinogen, UA 04/15/2021 0.2   Final   • Protein, UA 04/15/2021 Negative   Final   • pH, UA 04/15/2021 5.5   Final   • Blood, UA 04/15/2021 Moderate   Final   • Specific Gravity, UA 04/15/2021 >=1.030   Final   • Ketones, UA 04/15/2021 Negative   Final   • Bilirubin, UA 04/15/2021 Negative   Final   • Glucose, UA  04/15/2021 Negative   Final   • Appearance 04/15/2021 Clear   Final   • Color, UA 04/15/2021 Yellow   Final       EKG Results:  No orders to display       Imaging Results:  No Images in the past 120 days found..      Assessment/Plan   Diagnoses and all orders for this visit:    1. ADHD (attention deficit hyperactivity disorder), inattentive type (Primary)  -     amphetamine-dextroamphetamine XR (Adderall XR) 15 MG 24 hr capsule; Take 1 capsule by mouth Daily for 30 days  Dispense: 30 capsule; Refill: 0      Increase adderall xr from 10mg to 15mg po qday to target ADHD symptoms. 5 minutes of supportive psychotherapy with goal to strengthen defenses, promote problems solving, restore adaptive functioning and provide symptom relief. The therapeutic alliance was strengthened to encourage the patient to express their thoughts and feelings. Esteem building was enhanced through praise, reassurance, normalizing and encouragement. Coping skills were enhanced to build distress tolerance skills and emotional regulation. Patient given education on medication side effects, diagnosis/illness and relapse symptoms. Plan to continue supportive psychotherapy in next appointment to provide symptom relief. 2 month    Visit Diagnoses:    ICD-10-CM ICD-9-CM   1. ADHD (attention deficit hyperactivity disorder), inattentive type  F90.0 314.00       PLAN:  1. Safety: No acute safety concerns.   2. Referral: None at this time  3. Risk Assessment: Risk of self-harm acutely is moderate.  Risk factors include anxiety disorder, mood disorder, and recent psychosocial stressors (pandemic). Protective factors include no family history, denies access to guns/weapons, no present SI, no history of suicide attempts or self-harm in the past, minimal AODA, healthcare seeking, future orientation, willingness to engage in care.  Risk of self-harm chronically is also moderate, but could be further elevated in the event of treatment noncompliance and/or  AODA.  4. Medications: INCREASE adderall xr from 10mg to 15mg po qday to target ADHD symptoms. Risks, benefits, side effects discussed with patient including elevated heart rate, elevated blood pressure, irritability, insomnia, sexual dysfunction, appetite suppressing properties, psychosis.  After discussion of these risks and benefits, the patient voiced understanding and agreed to proceed. UDS ordered, Jeovany reviewed.  5. Labs/studies: UDS 10/20/21 negative  6. Follow-up: 2 month    Patient screened positive for depression based on a PHQ-9 score of  on . Follow-up recommendations include: Suicide Risk Assessment performed.         TREATMENT PLAN/GOALS: Continue supportive psychotherapy efforts and medications as indicated. Treatment and medication options discussed during today's visit. Patient ackowledged and verbally consented to continue with current treatment plan and was educated on the importance of compliance with treatment and follow-up appointments.    MEDICATION ISSUES:  JEOVANY reviewed as expected.  Discussed medication options and treatment plan of prescribed medication as well as the risks, benefits, and side effects including potential falls, possible impaired driving and metabolic adversities among others. Patient is agreeable to call the office with any worsening of symptoms or onset of side effects. Patient is agreeable to call 911 or go to the nearest ER should he/she begin having SI/HI. No medication side effects or related complaints today.     MEDS ORDERED DURING VISIT:  New Medications Ordered This Visit   Medications   • amphetamine-dextroamphetamine XR (Adderall XR) 15 MG 24 hr capsule     Sig: Take 1 capsule by mouth Daily for 30 days     Dispense:  30 capsule     Refill:  0       Return in about 8 weeks (around 5/27/2022) for Next scheduled follow up.         This document has been electronically signed by YENNIFER Loyd  April 1, 2022 14:20 EDT      Part of this note may be an  electronic transcription/translation of spoken language to printed text using the Dragon Dictation System.

## 2022-05-02 DIAGNOSIS — F90.0 ADHD (ATTENTION DEFICIT HYPERACTIVITY DISORDER), INATTENTIVE TYPE: ICD-10-CM

## 2022-05-02 RX ORDER — DEXTROAMPHETAMINE SACCHARATE, AMPHETAMINE ASPARTATE MONOHYDRATE, DEXTROAMPHETAMINE SULFATE AND AMPHETAMINE SULFATE 3.75; 3.75; 3.75; 3.75 MG/1; MG/1; MG/1; MG/1
15 CAPSULE, EXTENDED RELEASE ORAL DAILY
Qty: 30 CAPSULE | Refills: 0 | Status: SHIPPED | OUTPATIENT
Start: 2022-05-02 | End: 2022-06-02 | Stop reason: SDUPTHER

## 2022-05-02 NOTE — TELEPHONE ENCOUNTER
Pt called and it is time for a refill on her Adderall today, but she wants to know if she can either get a higher dosage, or something that will be more effective

## 2022-05-02 NOTE — TELEPHONE ENCOUNTER
Called pt and she wants to go ahead and give the current dose a try for another month to see if it is any different without the stress of her daughter's wedding and will change it if still needed at her next appt

## 2022-05-03 RX ORDER — DEXTROAMPHETAMINE SACCHARATE, AMPHETAMINE ASPARTATE MONOHYDRATE, DEXTROAMPHETAMINE SULFATE AND AMPHETAMINE SULFATE 3.75; 3.75; 3.75; 3.75 MG/1; MG/1; MG/1; MG/1
15 CAPSULE, EXTENDED RELEASE ORAL DAILY
Qty: 30 CAPSULE | Refills: 0 | OUTPATIENT
Start: 2022-05-03 | End: 2022-06-02

## 2022-05-04 ENCOUNTER — TELEPHONE (OUTPATIENT)
Dept: FAMILY MEDICINE CLINIC | Facility: CLINIC | Age: 62
End: 2022-05-04

## 2022-05-04 NOTE — TELEPHONE ENCOUNTER
Caller: [facility]    Relationship: Other    Best call back number: [phone]    Requested Prescriptions:   [facility]     Pharmacy where request should be sent: [facility]     Additional details provided by patient: [facility] REP STATES THE PRIOR AUTHORIZATION FOR THIS MEDICATION EXPIRES ON 5/13/2022.    TAHS COLEMAN, RegSched Rep   05/04/22 14:14 EDT

## 2022-05-06 DIAGNOSIS — F90.0 ADHD (ATTENTION DEFICIT HYPERACTIVITY DISORDER), INATTENTIVE TYPE: ICD-10-CM

## 2022-05-06 RX ORDER — DEXTROAMPHETAMINE SACCHARATE, AMPHETAMINE ASPARTATE MONOHYDRATE, DEXTROAMPHETAMINE SULFATE AND AMPHETAMINE SULFATE 3.75; 3.75; 3.75; 3.75 MG/1; MG/1; MG/1; MG/1
15 CAPSULE, EXTENDED RELEASE ORAL DAILY
Qty: 30 CAPSULE | Refills: 0 | OUTPATIENT
Start: 2022-05-06 | End: 2022-06-05

## 2022-05-31 ENCOUNTER — OFFICE VISIT (OUTPATIENT)
Dept: FAMILY MEDICINE CLINIC | Facility: CLINIC | Age: 62
End: 2022-05-31

## 2022-05-31 VITALS
OXYGEN SATURATION: 97 % | SYSTOLIC BLOOD PRESSURE: 126 MMHG | DIASTOLIC BLOOD PRESSURE: 70 MMHG | HEIGHT: 69 IN | WEIGHT: 242.2 LBS | TEMPERATURE: 97.2 F | HEART RATE: 106 BPM | BODY MASS INDEX: 35.87 KG/M2

## 2022-05-31 DIAGNOSIS — R53.83 LOW ENERGY: ICD-10-CM

## 2022-05-31 DIAGNOSIS — Z12.11 SCREENING FOR COLON CANCER: ICD-10-CM

## 2022-05-31 DIAGNOSIS — R63.5 WEIGHT GAIN: ICD-10-CM

## 2022-05-31 DIAGNOSIS — Z12.31 ENCOUNTER FOR SCREENING MAMMOGRAM FOR MALIGNANT NEOPLASM OF BREAST: Primary | ICD-10-CM

## 2022-05-31 DIAGNOSIS — R53.83 FATIGUE, UNSPECIFIED TYPE: ICD-10-CM

## 2022-05-31 PROCEDURE — 99213 OFFICE O/P EST LOW 20 MIN: CPT | Performed by: NURSE PRACTITIONER

## 2022-05-31 NOTE — PROGRESS NOTES
"Chief Complaint  Fatigue    Subjective        Ami Lewis presents to [facility]  Patient presents to the office today to discuss her fatigue.  She also states that she is gaining weight although she is trying to eat healthier and being active.  Patient has concerns of her glucose levels as she has a family history of diabetes and her A1c has been slightly elevated on the previous checks.  Patient states that she just does not have any energy and states that she feels like this throughout the day.  Patient does state that she is going to school full-time and working full-time.  I did ask about her sleep habits and she was unsure if she snores throughout the night.  I explained to the patient that if all of her labs were unremarkable that I would suggest a sleep study.  Blood pressure on arrival today is 126/70.  She denies any chest pain shortness breath palpitations this time    Fatigue  Associated symptoms include fatigue.       Objective   Vital Signs:  /70 (BP Location: Right arm, Patient Position: Sitting, Cuff Size: Adult)   Pulse 106   Temp 97.2 °F (36.2 °C) (Temporal)   Ht 175.3 cm (69\")   Wt 110 kg (242 lb 3.2 oz)   SpO2 97%   BMI 35.77 kg/m²     Class 2 Severe Obesity (BMI >=35 and <=39.9). Obesity-related health conditions include the following: none. Obesity is unchanged. BMI is is above average; BMI management plan is completed. We discussed portion control and increasing exercise.      Physical Exam  Vitals reviewed.   Constitutional:       Appearance: Normal appearance.   Cardiovascular:      Rate and Rhythm: Normal rate and regular rhythm.      Pulses: Normal pulses.      Heart sounds: Normal heart sounds, S1 normal and S2 normal. No murmur heard.  Pulmonary:      Effort: Pulmonary effort is normal. No respiratory distress.      Breath sounds: Normal breath sounds.   Skin:     General: Skin is warm and dry.   Neurological:      Mental Status: She is " alert and oriented to person, place, and time.   Psychiatric:         Attention and Perception: Attention normal.         Mood and Affect: Mood normal.         Behavior: Behavior normal.        Result Review :                Assessment and Plan   Diagnoses and all orders for this visit:    1. Encounter for screening mammogram for malignant neoplasm of breast (Primary)  -     Mammo Screening Digital Tomosynthesis Bilateral With CAD; Future    2. Screening for colon cancer  -     Ambulatory Referral For Screening Colonoscopy    3. Fatigue, unspecified type  -     CBC & Differential; Future  -     Comprehensive Metabolic Panel; Future  -     Hemoglobin A1c; Future  -     Lipid Panel; Future  -     TSH; Future  -     Vitamin D 25 hydroxy; Future  -     Vitamin B12; Future  -     Iron Profile; Future    4. Low energy  -     CBC & Differential; Future  -     Comprehensive Metabolic Panel; Future  -     Hemoglobin A1c; Future  -     Lipid Panel; Future  -     TSH; Future  -     Vitamin D 25 hydroxy; Future  -     Vitamin B12; Future  -     Iron Profile; Future    5. Weight gain  -     TSH; Future  -     Insulin, Total; Future             Follow Up   Return in about 6 months (around 11/30/2022).  Patient was given instructions and counseling regarding her condition or for health maintenance advice. Please see specific information pulled into the AVS if appropriate.

## 2022-06-01 ENCOUNTER — LAB (OUTPATIENT)
Dept: LAB | Facility: HOSPITAL | Age: 62
End: 2022-06-01

## 2022-06-01 DIAGNOSIS — R63.5 WEIGHT GAIN: ICD-10-CM

## 2022-06-01 DIAGNOSIS — R53.83 LOW ENERGY: ICD-10-CM

## 2022-06-01 DIAGNOSIS — R53.83 FATIGUE, UNSPECIFIED TYPE: ICD-10-CM

## 2022-06-01 LAB
25(OH)D3 SERPL-MCNC: 21 NG/ML (ref 30–100)
ALBUMIN SERPL-MCNC: 4 G/DL (ref 3.5–5.2)
ALBUMIN/GLOB SERPL: 1.3 G/DL
ALP SERPL-CCNC: 105 U/L (ref 39–117)
ALT SERPL W P-5'-P-CCNC: 52 U/L (ref 1–33)
ANION GAP SERPL CALCULATED.3IONS-SCNC: 11.7 MMOL/L (ref 5–15)
AST SERPL-CCNC: 46 U/L (ref 1–32)
BASOPHILS # BLD AUTO: 0.08 10*3/MM3 (ref 0–0.2)
BASOPHILS NFR BLD AUTO: 0.9 % (ref 0–1.5)
BILIRUB SERPL-MCNC: 0.2 MG/DL (ref 0–1.2)
BUN SERPL-MCNC: 16 MG/DL (ref 8–23)
BUN/CREAT SERPL: 23.2 (ref 7–25)
CALCIUM SPEC-SCNC: 9.4 MG/DL (ref 8.6–10.5)
CHLORIDE SERPL-SCNC: 109 MMOL/L (ref 98–107)
CHOLEST SERPL-MCNC: 184 MG/DL (ref 0–200)
CO2 SERPL-SCNC: 23.3 MMOL/L (ref 22–29)
CREAT SERPL-MCNC: 0.69 MG/DL (ref 0.57–1)
DEPRECATED RDW RBC AUTO: 41.6 FL (ref 37–54)
EGFRCR SERPLBLD CKD-EPI 2021: 99.5 ML/MIN/1.73
EOSINOPHIL # BLD AUTO: 0.44 10*3/MM3 (ref 0–0.4)
EOSINOPHIL NFR BLD AUTO: 5.1 % (ref 0.3–6.2)
ERYTHROCYTE [DISTWIDTH] IN BLOOD BY AUTOMATED COUNT: 12.1 % (ref 12.3–15.4)
GLOBULIN UR ELPH-MCNC: 3.1 GM/DL
GLUCOSE SERPL-MCNC: 99 MG/DL (ref 65–99)
HBA1C MFR BLD: 6.5 % (ref 4.8–5.6)
HCT VFR BLD AUTO: 43.7 % (ref 34–46.6)
HDLC SERPL-MCNC: 38 MG/DL (ref 40–60)
HGB BLD-MCNC: 14.6 G/DL (ref 12–15.9)
IMM GRANULOCYTES # BLD AUTO: 0.04 10*3/MM3 (ref 0–0.05)
IMM GRANULOCYTES NFR BLD AUTO: 0.5 % (ref 0–0.5)
IRON 24H UR-MRATE: 74 MCG/DL (ref 37–145)
IRON SATN MFR SERPL: 21 % (ref 20–50)
LDLC SERPL CALC-MCNC: 99 MG/DL (ref 0–100)
LDLC/HDLC SERPL: 2.4 {RATIO}
LYMPHOCYTES # BLD AUTO: 2.95 10*3/MM3 (ref 0.7–3.1)
LYMPHOCYTES NFR BLD AUTO: 33.9 % (ref 19.6–45.3)
MCH RBC QN AUTO: 31.5 PG (ref 26.6–33)
MCHC RBC AUTO-ENTMCNC: 33.4 G/DL (ref 31.5–35.7)
MCV RBC AUTO: 94.4 FL (ref 79–97)
MONOCYTES # BLD AUTO: 0.68 10*3/MM3 (ref 0.1–0.9)
MONOCYTES NFR BLD AUTO: 7.8 % (ref 5–12)
NEUTROPHILS NFR BLD AUTO: 4.51 10*3/MM3 (ref 1.7–7)
NEUTROPHILS NFR BLD AUTO: 51.8 % (ref 42.7–76)
NRBC BLD AUTO-RTO: 0.1 /100 WBC (ref 0–0.2)
PLATELET # BLD AUTO: 270 10*3/MM3 (ref 140–450)
PMV BLD AUTO: 10.7 FL (ref 6–12)
POTASSIUM SERPL-SCNC: 3.9 MMOL/L (ref 3.5–5.2)
PROT SERPL-MCNC: 7.1 G/DL (ref 6–8.5)
RBC # BLD AUTO: 4.63 10*6/MM3 (ref 3.77–5.28)
SODIUM SERPL-SCNC: 144 MMOL/L (ref 136–145)
TIBC SERPL-MCNC: 353 MCG/DL (ref 298–536)
TRANSFERRIN SERPL-MCNC: 237 MG/DL (ref 200–360)
TRIGL SERPL-MCNC: 274 MG/DL (ref 0–150)
TSH SERPL DL<=0.05 MIU/L-ACNC: 2.89 UIU/ML (ref 0.27–4.2)
VIT B12 BLD-MCNC: 547 PG/ML (ref 211–946)
VLDLC SERPL-MCNC: 47 MG/DL (ref 5–40)
WBC NRBC COR # BLD: 8.7 10*3/MM3 (ref 3.4–10.8)

## 2022-06-01 PROCEDURE — 83540 ASSAY OF IRON: CPT

## 2022-06-01 PROCEDURE — 36415 COLL VENOUS BLD VENIPUNCTURE: CPT

## 2022-06-01 PROCEDURE — 83036 HEMOGLOBIN GLYCOSYLATED A1C: CPT

## 2022-06-01 PROCEDURE — 80061 LIPID PANEL: CPT

## 2022-06-01 PROCEDURE — 83525 ASSAY OF INSULIN: CPT

## 2022-06-01 PROCEDURE — 82607 VITAMIN B-12: CPT

## 2022-06-01 PROCEDURE — 82306 VITAMIN D 25 HYDROXY: CPT

## 2022-06-01 PROCEDURE — 80050 GENERAL HEALTH PANEL: CPT

## 2022-06-01 PROCEDURE — 84466 ASSAY OF TRANSFERRIN: CPT

## 2022-06-02 ENCOUNTER — TELEPHONE (OUTPATIENT)
Dept: PSYCHIATRY | Facility: CLINIC | Age: 62
End: 2022-06-02

## 2022-06-02 ENCOUNTER — TELEMEDICINE (OUTPATIENT)
Dept: PSYCHIATRY | Facility: CLINIC | Age: 62
End: 2022-06-02

## 2022-06-02 DIAGNOSIS — F90.0 ADHD (ATTENTION DEFICIT HYPERACTIVITY DISORDER), INATTENTIVE TYPE: Primary | ICD-10-CM

## 2022-06-02 LAB — INSULIN SERPL-ACNC: 96 UIU/ML (ref 2.6–24.9)

## 2022-06-02 PROCEDURE — 99213 OFFICE O/P EST LOW 20 MIN: CPT | Performed by: NURSE PRACTITIONER

## 2022-06-02 RX ORDER — IBUPROFEN 200 MG
200 TABLET ORAL EVERY 6 HOURS PRN
COMMUNITY
End: 2022-09-20 | Stop reason: ALTCHOICE

## 2022-06-02 RX ORDER — DEXTROAMPHETAMINE SACCHARATE, AMPHETAMINE ASPARTATE MONOHYDRATE, DEXTROAMPHETAMINE SULFATE AND AMPHETAMINE SULFATE 3.75; 3.75; 3.75; 3.75 MG/1; MG/1; MG/1; MG/1
15 CAPSULE, EXTENDED RELEASE ORAL DAILY
Qty: 30 CAPSULE | Refills: 0 | Status: SHIPPED | OUTPATIENT
Start: 2022-06-02 | End: 2022-07-06 | Stop reason: SDUPTHER

## 2022-06-02 NOTE — PROGRESS NOTES
Subjective   Ami Lewis is a 61 y.o. female who presents today for follow up.   Mode of visit: Video  Location [address] provider: [address], [place] [address].  Location of patient: Home  Does the patient consent to use a video/audio connection for your medical care today? Yes  The visit included audio and video interaction. No technical issues occurred during this visit.  This provider is located at [address] [facility] [address], [address] in [place], [place].  Chief Complaint:  ADHD    History of Present Illness:     ADHD: Focus and concentration have been good. Able to complete tasks in school and work. Is struggling some with Math class, but has always had some trouble with Math. Denies other symptoms of ADHD.     11/17/21 Patient had evaluation for ADHD on November 4, 2021 by Phill Bustos a licensed clinical psychologist.  Patient was found to be criteria for adult attention deficit hyperactivity disorder, combined.  Symptoms included difficulties with taking college courses, her mind wandering, feeling overwhelmed and maintaining her interest.  On the adult ADHD self-report scale symptom checklist, patient indicated chronic difficulties with attention to details, organizing tasks, avoiding tasks requiring a lot of thoughts and fidgeting and squirming.  Her symptoms highly consistent with ADHD in adults.  Also acknowledged the majority of the listed symptoms of ADHD in the DSM-V.    Psychiatric Review of Systems: Denies symptoms of psychosis, fredrick or PTSD      PHQ-9 Depression Screening  PHQ-9 Total Score:      Little interest or pleasure in doing things?     Feeling down, depressed, or hopeless?     Trouble falling or staying asleep, or sleeping too much?     Feeling tired or having little energy?     Poor appetite or overeating?     Feeling bad about yourself - or that you are a failure or have let yourself or your family down?     Trouble concentrating on things, such as reading  the newspaper or watching television?     Moving or speaking so slowly that other people could have noticed? Or the opposite - being so fidgety or restless that you have been moving around a lot more than usual?     Thoughts that you would be better off dead, or of hurting yourself in some way?     PHQ-9 Total Score       ALEXA-7  Feeling nervous, anxious or on edge: Not at all  Not being able to stop or control worrying: Not at all  Worrying too much about different things: Not at all  Trouble Relaxing: Not at all  Being so restless that it is hard to sit still: Not at all  Feeling afraid as if something awful might happen: Not at all  Becoming easily annoyed or irritable: Not at all  ALEXA 7 Total Score: 0  If you checked any problems, how difficult have these problems made it for you to do your work, take care of things at home, or get along with other people: Not difficult at all    Past Surgical History:  Past Surgical History:   Procedure Laterality Date   •  SECTION     • CLUB FOOT RELEASE     • TUBAL ABDOMINAL LIGATION         Problem List:  Patient Active Problem List   Diagnosis   • Anxiety   • Deafness   • Depression   • Head injury   • Primary localized osteoarthrosis, ankle and foot       Allergy:   Allergies   Allergen Reactions   • Codeine Itching   • Flexeril [Cyclobenzaprine] Nausea And Vomiting   • Sulfa Antibiotics Hives        Discontinued Medications:  Medications Discontinued During This Encounter   Medication Reason   • amphetamine-dextroamphetamine XR (Adderall XR) 15 MG 24 hr capsule Reorder       Current Medications:   Current Outpatient Medications   Medication Sig Dispense Refill   • amphetamine-dextroamphetamine XR (Adderall XR) 15 MG 24 hr capsule Take 1 capsule by mouth Daily for 30 days 30 capsule 0   • ibuprofen (Advil) 200 MG tablet Take 200 mg by mouth Every 6 (Six) Hours As Needed for Mild Pain .       No current facility-administered medications for this visit.       Past  "Medical History:  Past Medical History:   Diagnosis Date   • ADHD (attention deficit hyperactivity disorder)    • Anxiety    • Depression        Past Psychiatric History:  Medication Trials: Zoloft, wellbutrin      Substance Abuse History:   Types: Denies    Access to Firearms: Denies    Social History     Socioeconomic History   • Marital status:    Tobacco Use   • Smoking status: Current Every Day Smoker     Packs/day: 0.50     Years: 40.00     Pack years: 20.00     Types: Cigarettes   • Smokeless tobacco: Never Used   Vaping Use   • Vaping Use: Never used   Substance and Sexual Activity   • Alcohol use: Never   • Drug use: Never   • Sexual activity: Defer         Family History   Problem Relation Age of Onset   • Breast cancer Mother    • Cervical cancer Mother    • COPD Mother    • Lung cancer Mother    • Colon polyps Mother    • Diabetes Paternal Grandmother            Mental Status Exam:     Appearance: good eye contact, normal street clothes, groomed, sitting in chair   Behavior: pleasant and cooperative  Motor: no abnormal  Speech: normal rhythm, rate, volume, tone, not hyperverbal, not pressured, normal prosidy  Mood: \"Alright\"  Affect: euthymic  Thought Content: negative suicidal ideations, negative homicidal ideations, negative obsessions  Perceptions: negative auditory hallucinations, negative visual hallucinations, negative delusions, negative paranoia  Thought Process: goal directed, linear  Insight/Judgement: fair/fair  Cognition: grossly intact  Attention: intact  Orientation: person, place, time and situation  Memory: intact    Review of Systems:     Constitutional: Denies fatigue, night sweats  Eyes: Denies double vision, blurred vision  HENT: Denies vertigo, recent head injury  Cardiovascular: Denies chest pain, irregular heartbeats  Respiratory: Denies productive cough, shortness of breath  Gastrointestinal: Denies nausea, vomiting  Genitourinary: Denies dysuria, urinary " retention  Integument: Denies hair growth change, new skin lesions  Neurologic: Denies altered mental status, seizures  Musculoskeletal: Denies joint swelling, limitation of motion  Endocrine: Denies cold intolerance, heat intolerance  Psychiatric: Admits depression, attention issues. Denies fredrick, post-traumatic stress disorder, obsessive compulsive disorder, psychosis.   Allergic-immunologic: Denies frequent illnesses      Vital Signs:   There were no vitals taken for this visit.     Lab Results:   Lab on 06/01/2022   Component Date Value Ref Range Status   • Glucose 06/01/2022 99  65 - 99 mg/dL Final   • BUN 06/01/2022 16  8 - 23 mg/dL Final   • Creatinine 06/01/2022 0.69  0.57 - 1.00 mg/dL Final   • Sodium 06/01/2022 144  136 - 145 mmol/L Final   • Potassium 06/01/2022 3.9  3.5 - 5.2 mmol/L Final   • Chloride 06/01/2022 109 (A) 98 - 107 mmol/L Final   • CO2 06/01/2022 23.3  22.0 - 29.0 mmol/L Final   • Calcium 06/01/2022 9.4  8.6 - 10.5 mg/dL Final   • Total Protein 06/01/2022 7.1  6.0 - 8.5 g/dL Final   • Albumin 06/01/2022 4.00  3.50 - 5.20 g/dL Final   • ALT (SGPT) 06/01/2022 52 (A) 1 - 33 U/L Final   • AST (SGOT) 06/01/2022 46 (A) 1 - 32 U/L Final   • Alkaline Phosphatase 06/01/2022 105  39 - 117 U/L Final   • Total Bilirubin 06/01/2022 0.2  0.0 - 1.2 mg/dL Final   • Globulin 06/01/2022 3.1  gm/dL Final   • A/G Ratio 06/01/2022 1.3  g/dL Final   • BUN/Creatinine Ratio 06/01/2022 23.2  7.0 - 25.0 Final   • Anion Gap 06/01/2022 11.7  5.0 - 15.0 mmol/L Final   • eGFR 06/01/2022 99.5  >60.0 mL/min/1.73 Final    National Kidney Foundation and American Society of Nephrology (ASN) Task Force recommended calculation based on the Chronic Kidney Disease Epidemiology Collaboration (CKD-EPI) equation refit without adjustment for race.   • Hemoglobin A1C 06/01/2022 6.50 (A) 4.80 - 5.60 % Final   • Total Cholesterol 06/01/2022 184  0 - 200 mg/dL Final   • Triglycerides 06/01/2022 274 (A) 0 - 150 mg/dL Final   • HDL  Cholesterol 06/01/2022 38 (A) 40 - 60 mg/dL Final   • LDL Cholesterol  06/01/2022 99  0 - 100 mg/dL Final   • VLDL Cholesterol 06/01/2022 47 (A) 5 - 40 mg/dL Final   • LDL/HDL Ratio 06/01/2022 2.40   Final   • TSH 06/01/2022 2.890  0.270 - 4.200 uIU/mL Final   • 25 Hydroxy, Vitamin D 06/01/2022 21.0 (A) 30.0 - 100.0 ng/ml Final   • Vitamin B-12 06/01/2022 547  211 - 946 pg/mL Final   • Iron 06/01/2022 74  37 - 145 mcg/dL Final   • Iron Saturation 06/01/2022 21  20 - 50 % Final   • Transferrin 06/01/2022 237  200 - 360 mg/dL Final   • TIBC 06/01/2022 353  298 - 536 mcg/dL Final   • WBC 06/01/2022 8.70  3.40 - 10.80 10*3/mm3 Final   • RBC 06/01/2022 4.63  3.77 - 5.28 10*6/mm3 Final   • Hemoglobin 06/01/2022 14.6  12.0 - 15.9 g/dL Final   • Hematocrit 06/01/2022 43.7  34.0 - 46.6 % Final   • MCV 06/01/2022 94.4  79.0 - 97.0 fL Final   • MCH 06/01/2022 31.5  26.6 - 33.0 pg Final   • MCHC 06/01/2022 33.4  31.5 - 35.7 g/dL Final   • RDW 06/01/2022 12.1 (A) 12.3 - 15.4 % Final   • RDW-SD 06/01/2022 41.6  37.0 - 54.0 fl Final   • MPV 06/01/2022 10.7  6.0 - 12.0 fL Final   • Platelets 06/01/2022 270  140 - 450 10*3/mm3 Final   • Neutrophil % 06/01/2022 51.8  42.7 - 76.0 % Final   • Lymphocyte % 06/01/2022 33.9  19.6 - 45.3 % Final   • Monocyte % 06/01/2022 7.8  5.0 - 12.0 % Final   • Eosinophil % 06/01/2022 5.1  0.3 - 6.2 % Final   • Basophil % 06/01/2022 0.9  0.0 - 1.5 % Final   • Immature Grans % 06/01/2022 0.5  0.0 - 0.5 % Final   • Neutrophils, Absolute 06/01/2022 4.51  1.70 - 7.00 10*3/mm3 Final   • Lymphocytes, Absolute 06/01/2022 2.95  0.70 - 3.10 10*3/mm3 Final   • Monocytes, Absolute 06/01/2022 0.68  0.10 - 0.90 10*3/mm3 Final   • Eosinophils, Absolute 06/01/2022 0.44 (A) 0.00 - 0.40 10*3/mm3 Final   • Basophils, Absolute 06/01/2022 0.08  0.00 - 0.20 10*3/mm3 Final   • Immature Grans, Absolute 06/01/2022 0.04  0.00 - 0.05 10*3/mm3 Final   • nRBC 06/01/2022 0.1  0.0 - 0.2 /100 WBC Final   Office Visit on 02/10/2022    Component Date Value Ref Range Status   • Rapid Strep A Screen 02/10/2022 Negative  Negative, VALID, INVALID, Not Performed Final   • Internal Control 02/10/2022 Passed  Passed Final   • Lot Number 02/10/2022 [id number]   Final   • Expiration Date 02/10/2022 7/31/2023   Final   Lab on 10/18/2021   Component Date Value Ref Range Status   • TSPOTTB 10/18/2021 Negative  Negative Final   • T-SPOT Panel A 10/18/2021 0   Final   • T-SPOT Panel B 10/18/2021 1   Final   • TSPOT NIL CONTROL INTERP 10/18/2021 Passed   Final   • TSPOT POS CONTROL INTERP 10/18/2021 Passed   Final   Admission on 07/14/2021, Discharged on 07/14/2021   Component Date Value Ref Range Status   • Rapid Strep A Screen 07/14/2021 Negative  Negative, VALID, INVALID, Not Performed Final   • Internal Control 07/14/2021 Passed  Passed Final   • Lot Number 07/14/2021 [id number]   Final   • Expiration Date 07/14/2021 33,122   Final   • Rapid Influenza A Ag 07/14/2021 Positive (A) Negative Final   • Rapid Influenza B Ag 07/14/2021 Negative  Negative Final   • Internal Control 07/14/2021 Passed  Passed Final   • Lot Number 07/14/2021 [id number]   Final   • Expiration Date 07/14/2021 42,822   Final   • SARS Antigen 07/14/2021 Not Detected  Not Detected Final   • Internal Control 07/14/2021 Passed  Passed Final   • Lot Number 07/14/2021 [id number]   Final   • Expiration Date 07/14/2021 10,923   Final   • Throat Culture, Beta Strep 07/14/2021 No Beta Hemolytic Streptococcus Isolated   Final       EKG Results:  No orders to display       Imaging Results:  No Images in the past 120 days found..      Assessment & Plan   Diagnoses and all orders for this visit:    1. ADHD (attention deficit hyperactivity disorder), inattentive type (Primary)  -     amphetamine-dextroamphetamine XR (Adderall XR) 15 MG 24 hr capsule; Take 1 capsule by mouth Daily for 30 days  Dispense: 30 capsule; Refill: 0      Continue adderall xr 15mg po qday to target ADHD symptoms. 5 minutes of  supportive psychotherapy with goal to strengthen defenses, promote problems solving, restore adaptive functioning and provide symptom relief. The therapeutic alliance was strengthened to encourage the patient to express their thoughts and feelings. Esteem building was enhanced through praise, reassurance, normalizing and encouragement. Coping skills were enhanced to build distress tolerance skills and emotional regulation. Patient given education on medication side effects, diagnosis/illness and relapse symptoms. Plan to continue supportive psychotherapy in next appointment to provide symptom relief. 3 month    Visit Diagnoses:    ICD-10-CM ICD-9-CM   1. ADHD (attention deficit hyperactivity disorder), inattentive type  F90.0 314.00       PLAN:  1. Safety: No acute safety concerns.   2. Referral: None at this time  3. Risk Assessment: Risk of self-harm acutely is moderate.  Risk factors include anxiety disorder, mood disorder, and recent psychosocial stressors (pandemic). Protective factors include no family history, denies access to guns/weapons, no present SI, no history of suicide attempts or self-harm in the past, minimal AODA, healthcare seeking, future orientation, willingness to engage in care.  Risk of self-harm chronically is also moderate, but could be further elevated in the event of treatment noncompliance and/or AODA.  4. Medications: CONTINUE adderall xr 15mg po qday to target ADHD symptoms. Risks, benefits, side effects discussed with patient including elevated heart rate, elevated blood pressure, irritability, insomnia, sexual dysfunction, appetite suppressing properties, psychosis.  After discussion of these risks and benefits, the patient voiced understanding and agreed to proceed. UDS ordered, Irvin reviewed.  5. Labs/studies: UDS 10/20/21 negative  6. Follow-up: 3 month    Patient screened positive for depression based on a PHQ-9 score of  on . Follow-up recommendations include: Suicide Risk  Assessment performed.         TREATMENT PLAN/GOALS: Continue supportive psychotherapy efforts and medications as indicated. Treatment and medication options discussed during today's visit. Patient ackowledged and verbally consented to continue with current treatment plan and was educated on the importance of compliance with treatment and follow-up appointments.    MEDICATION ISSUES:  JEOVANY reviewed as expected.  Discussed medication options and treatment plan of prescribed medication as well as the risks, benefits, and side effects including potential falls, possible impaired driving and metabolic adversities among others. Patient is agreeable to call the office with any worsening of symptoms or onset of side effects. Patient is agreeable to call 911 or go to the nearest ER should he/she begin having SI/HI. No medication side effects or related complaints today.     MEDS ORDERED DURING VISIT:  New Medications Ordered This Visit   Medications   • amphetamine-dextroamphetamine XR (Adderall XR) 15 MG 24 hr capsule     Sig: Take 1 capsule by mouth Daily for 30 days     Dispense:  30 capsule     Refill:  0       Return in about 12 weeks (around 8/25/2022) for Next scheduled follow up.         This document has been electronically signed by YENNIFER Loyd  June 2, 2022 09:24 EDT      Part of this note may be an electronic transcription/translation of spoken language to printed text using the Dragon Dictation System.

## 2022-06-02 NOTE — TELEPHONE ENCOUNTER
PATIENT STATED THAT THEY WOULD HAVE TO CALL BACK TO SCHEDULE THEIR FOLLOW UP BECAUSE IT WAS 3 MONTHS AWAY AND THEY DIDN'T HAVE THEIR DATE BOOK OR THEIR NEW SCHOOL SCHEDULE IN FRONT OF THEM AT THE TIME.

## 2022-06-03 DIAGNOSIS — E88.81 INSULIN RESISTANCE: Primary | ICD-10-CM

## 2022-06-03 DIAGNOSIS — E55.9 VITAMIN D DEFICIENCY: Primary | ICD-10-CM

## 2022-06-03 RX ORDER — ERGOCALCIFEROL 1.25 MG/1
50000 CAPSULE ORAL
Qty: 13 CAPSULE | Refills: 1 | Status: SHIPPED | OUTPATIENT
Start: 2022-06-03 | End: 2023-02-06 | Stop reason: SDUPTHER

## 2022-06-03 RX ORDER — METFORMIN HYDROCHLORIDE 500 MG/1
500 TABLET, EXTENDED RELEASE ORAL
Qty: 90 TABLET | Refills: 1 | Status: SHIPPED | OUTPATIENT
Start: 2022-06-03 | End: 2022-09-20 | Stop reason: SDUPTHER

## 2022-07-06 DIAGNOSIS — F90.0 ADHD (ATTENTION DEFICIT HYPERACTIVITY DISORDER), INATTENTIVE TYPE: ICD-10-CM

## 2022-07-06 RX ORDER — DEXTROAMPHETAMINE SACCHARATE, AMPHETAMINE ASPARTATE MONOHYDRATE, DEXTROAMPHETAMINE SULFATE AND AMPHETAMINE SULFATE 3.75; 3.75; 3.75; 3.75 MG/1; MG/1; MG/1; MG/1
15 CAPSULE, EXTENDED RELEASE ORAL DAILY
Qty: 30 CAPSULE | Refills: 0 | Status: SHIPPED | OUTPATIENT
Start: 2022-07-06 | End: 2022-08-04 | Stop reason: SDUPTHER

## 2022-08-04 DIAGNOSIS — F90.0 ADHD (ATTENTION DEFICIT HYPERACTIVITY DISORDER), INATTENTIVE TYPE: ICD-10-CM

## 2022-08-04 RX ORDER — DEXTROAMPHETAMINE SACCHARATE, AMPHETAMINE ASPARTATE MONOHYDRATE, DEXTROAMPHETAMINE SULFATE AND AMPHETAMINE SULFATE 3.75; 3.75; 3.75; 3.75 MG/1; MG/1; MG/1; MG/1
15 CAPSULE, EXTENDED RELEASE ORAL DAILY
Qty: 30 CAPSULE | Refills: 0 | Status: SHIPPED | OUTPATIENT
Start: 2022-08-04 | End: 2022-08-24 | Stop reason: SDUPTHER

## 2022-08-04 NOTE — TELEPHONE ENCOUNTER
Medication refill request for Adderall XR 15mg. Pt needs f/u appt scheduled. Called pt and scheduled next f/u appt for 08/24/2022. Medication order pended.

## 2022-08-24 ENCOUNTER — TELEMEDICINE (OUTPATIENT)
Dept: PSYCHIATRY | Facility: CLINIC | Age: 62
End: 2022-08-24

## 2022-08-24 DIAGNOSIS — F90.0 ADHD (ATTENTION DEFICIT HYPERACTIVITY DISORDER), INATTENTIVE TYPE: ICD-10-CM

## 2022-08-24 PROCEDURE — 99214 OFFICE O/P EST MOD 30 MIN: CPT | Performed by: NURSE PRACTITIONER

## 2022-08-24 RX ORDER — DEXTROAMPHETAMINE SACCHARATE, AMPHETAMINE ASPARTATE MONOHYDRATE, DEXTROAMPHETAMINE SULFATE AND AMPHETAMINE SULFATE 3.75; 3.75; 3.75; 3.75 MG/1; MG/1; MG/1; MG/1
15 CAPSULE, EXTENDED RELEASE ORAL DAILY
Qty: 30 CAPSULE | Refills: 0 | Status: SHIPPED | OUTPATIENT
Start: 2022-09-03 | End: 2022-10-04 | Stop reason: SDUPTHER

## 2022-08-24 NOTE — TREATMENT PLAN
Multi-Disciplinary Problems (from Behavioral Health Treatment Plan)    Active Problems     Problem: ADHD (Adult)  Start Date: 08/24/22    Goal Priority Start Date Expected End Date End Date    Patient will sustain attention and concentration to complete chores, and work responsibilites and increase positive interaction in all relationships. -- 08/24/22 -- --    Goal Details: Progress toward goal:  Not appropriate to rate progress toward goal since this is the initial treatment plan.      Goal Intervention Frequency Start Date End Date    Assist patient in setting responsible goals and breaking down large tasks. Weekly 08/24/22 --    Intervention Details: Duration of treatment until until remission of symptoms.      Goal Intervention Frequency Start Date End Date    Assist patient in using self monitoring checklist to improve attention, work performance, and social skills. Weekly 08/24/22 --    Intervention Details: Duration of treatment until until remission of symptoms.                  Reviewed By     Quincy Yap APRN 08/24/22 0944    Quincy Yap APRN 08/24/22 0944                 I have discussed and reviewed this treatment plan with the patient.

## 2022-08-24 NOTE — PROGRESS NOTES
Subjective   Ami Lewis is a 61 y.o. female who presents today for follow up.   Mode of visit: Video  Location of provider: [address], [address], [place] [address].  Location of patient: Home  Does the patient consent to use a video/audio connection for your medical care today? Yes  The visit included audio and video interaction. No technical issues occurred during this visit.  This provider is located at [address], [address] in [place], [place].  Chief Complaint:  ADHD    History of Present Illness:     ADHD: focus and concentration have improved. Able to complete tasks effectively. Grades coming up. Feels as though wearing off.     11/17/21 Patient had evaluation for ADHD on November 4, 2021 by Phill Bustos a licensed clinical psychologist.  Patient was found to be criteria for adult attention deficit hyperactivity disorder, combined.  Symptoms included difficulties with taking college courses, her mind wandering, feeling overwhelmed and maintaining her interest.  On the adult ADHD self-report scale symptom checklist, patient indicated chronic difficulties with attention to details, organizing tasks, avoiding tasks requiring a lot of thoughts and fidgeting and squirming.  Her symptoms highly consistent with ADHD in adults.  Also acknowledged the majority of the listed symptoms of ADHD in the DSM-V.    Psychiatric Review of Systems: Denies symptoms of psychosis, fredrick or PTSD      PHQ-9 Depression Screening  PHQ-9 Total Score:      Little interest or pleasure in doing things?     Feeling down, depressed, or hopeless?     Trouble falling or staying asleep, or sleeping too much?     Feeling tired or having little energy?     Poor appetite or overeating?     Feeling bad about yourself - or that you are a failure or have let yourself or your family down?     Trouble concentrating on things, such as reading the newspaper or watching television?     Moving or speaking so slowly  that other people could have noticed? Or the opposite - being so fidgety or restless that you have been moving around a lot more than usual?     Thoughts that you would be better off dead, or of hurting yourself in some way?     PHQ-9 Total Score       ALEXA-7       Past Surgical History:  Past Surgical History:   Procedure Laterality Date   •  SECTION     • CLUB FOOT RELEASE     • TUBAL ABDOMINAL LIGATION         Problem List:  Patient Active Problem List   Diagnosis   • Anxiety   • Deafness   • Depression   • Head injury   • Primary localized osteoarthrosis, ankle and foot       Allergy:   Allergies   Allergen Reactions   • Codeine Itching   • Flexeril [Cyclobenzaprine] Nausea And Vomiting   • Sulfa Antibiotics Hives        Discontinued Medications:  Medications Discontinued During This Encounter   Medication Reason   • amphetamine-dextroamphetamine XR (Adderall XR) 15 MG 24 hr capsule Reorder       Current Medications:   Current Outpatient Medications   Medication Sig Dispense Refill   • [START ON 9/3/2022] amphetamine-dextroamphetamine XR (Adderall XR) 15 MG 24 hr capsule Take 1 capsule by mouth Daily for 30 days 30 capsule 0   • ibuprofen (ADVIL,MOTRIN) 200 MG tablet Take 200 mg by mouth Every 6 (Six) Hours As Needed for Mild Pain .     • metFORMIN ER (GLUCOPHAGE-XR) 500 MG 24 hr tablet Take 1 tablet by mouth Daily With Breakfast. 90 tablet 1   • vitamin D (ERGOCALCIFEROL) 1.25 MG (43035 UT) capsule capsule Take 1 capsule by mouth Every 7 (Seven) Days. 13 capsule 1     No current facility-administered medications for this visit.       Past Medical History:  Past Medical History:   Diagnosis Date   • ADHD (attention deficit hyperactivity disorder)    • Anxiety    • Depression        Past Psychiatric History:  Medication Trials: Zoloft, wellbutrin      Substance Abuse History:   Types: Denies    Access to Firearms: Denies    Social History     Socioeconomic History   • Marital status:    Tobacco Use  "  • Smoking status: Current Every Day Smoker     Packs/day: 0.50     Years: 40.00     Pack years: 20.00     Types: Cigarettes   • Smokeless tobacco: Never Used   Vaping Use   • Vaping Use: Never used   Substance and Sexual Activity   • Alcohol use: Never   • Drug use: Never   • Sexual activity: Defer         Family History   Problem Relation Age of Onset   • Breast cancer Mother    • Cervical cancer Mother    • COPD Mother    • Lung cancer Mother    • Colon polyps Mother    • Diabetes Paternal Grandmother            Mental Status Exam:     Appearance: good eye contact, normal street clothes, groomed, sitting in chair   Behavior: pleasant and cooperative  Motor: no abnormal  Speech: normal rhythm, rate, volume, tone, not hyperverbal, not pressured, normal prosidy  Mood: \"\"Okay\"  Affect: euthymic  Thought Content: negative suicidal ideations, negative homicidal ideations, negative obsessions  Perceptions: negative auditory hallucinations, negative visual hallucinations, negative delusions, negative paranoia  Thought Process: goal directed, linear  Insight/Judgement: fair/fair  Cognition: grossly intact  Attention: intact  Orientation: person, place, time and situation  Memory: intact    Review of Systems:     Constitutional: Denies fatigue, night sweats  Eyes: Denies double vision, blurred vision  HENT: Denies vertigo, recent head injury  Cardiovascular: Denies chest pain, irregular heartbeats  Respiratory: Denies productive cough, shortness of breath  Gastrointestinal: Denies nausea, vomiting  Genitourinary: Denies dysuria, urinary retention  Integument: Denies hair growth change, new skin lesions  Neurologic: Denies altered mental status, seizures  Musculoskeletal: Denies joint swelling, limitation of motion  Endocrine: Denies cold intolerance, heat intolerance  Psychiatric: Admits depression, attention issues. Denies fredrick, post-traumatic stress disorder, obsessive compulsive disorder, psychosis. "   Allergic-immunologic: Denies frequent illnesses      Vital Signs:   There were no vitals taken for this visit.     Lab Results:   Admission on 06/28/2022, Discharged on 06/28/2022   Component Date Value Ref Range Status   • Color 06/28/2022 Yellow  Yellow, Straw, Dark Yellow, Donna Final   • Clarity, UA 06/28/2022 Clear  Clear Final   • Glucose, UA 06/28/2022 Negative  Negative mg/dL Final   • Bilirubin 06/28/2022 Negative  Negative Final   • Ketones, UA 06/28/2022 Negative  Negative Final   • Specific Gravity  06/28/2022 1.025  1.005 - 1.030 Final   • Blood, UA 06/28/2022 Moderate (A) Negative Final   • pH, Urine 06/28/2022 6.0  5.0 - 8.0 Final   • Protein, POC 06/28/2022 2+ (A) Negative mg/dL Final   • Urobilinogen, UA 06/28/2022 4 E.U./dL  (A) Normal Final   • Nitrite, UA 06/28/2022 Negative  Negative Final   • Leukocytes 06/28/2022 Negative  Negative Final   • Rapid Strep A Screen 06/28/2022 Negative  Negative, VALID, INVALID, Not Performed Final   • Internal Control 06/28/2022 Passed  Passed Final   • Lot Number 06/28/2022 [id number]   Final   • Expiration Date 06/28/2022 [id number]   Final   • Rapid Influenza A Ag 06/28/2022 Negative  Negative Final   • Rapid Influenza B Ag 06/28/2022 Negative  Negative Final   • Internal Control 06/28/2022 Passed  Passed Final   • Lot Number 06/28/2022 [id number]   Final   • Expiration Date 06/28/2022 [id number]   Final   • SARS Antigen 06/28/2022 Detected (A) Not Detected, Presumptive Negative Final   • Internal Control 06/28/2022 Passed  Passed Final   • Lot Number 06/28/2022 [id number]   Final   • Expiration Date 06/28/2022 [id number]   Final   Lab on 06/01/2022   Component Date Value Ref Range Status   • Glucose 06/01/2022 99  65 - 99 mg/dL Final   • BUN 06/01/2022 16  8 - 23 mg/dL Final   • Creatinine 06/01/2022 0.69  0.57 - 1.00 mg/dL Final   • Sodium 06/01/2022 144  136 - 145 mmol/L Final   • Potassium 06/01/2022 3.9  3.5 - 5.2 mmol/L Final   • Chloride 06/01/2022 109 (A) 98 -  107 mmol/L Final   • CO2 06/01/2022 23.3  22.0 - 29.0 mmol/L Final   • Calcium 06/01/2022 9.4  8.6 - 10.5 mg/dL Final   • Total Protein 06/01/2022 7.1  6.0 - 8.5 g/dL Final   • Albumin 06/01/2022 4.00  3.50 - 5.20 g/dL Final   • ALT (SGPT) 06/01/2022 52 (A) 1 - 33 U/L Final   • AST (SGOT) 06/01/2022 46 (A) 1 - 32 U/L Final   • Alkaline Phosphatase 06/01/2022 105  39 - 117 U/L Final   • Total Bilirubin 06/01/2022 0.2  0.0 - 1.2 mg/dL Final   • Globulin 06/01/2022 3.1  gm/dL Final   • A/G Ratio 06/01/2022 1.3  g/dL Final   • BUN/Creatinine Ratio 06/01/2022 23.2  7.0 - 25.0 Final   • Anion Gap 06/01/2022 11.7  5.0 - 15.0 mmol/L Final   • eGFR 06/01/2022 99.5  >60.0 mL/min/1.73 Final    National Kidney Foundation and American Society of Nephrology (ASN) Task Force recommended calculation based on the Chronic Kidney Disease Epidemiology Collaboration (CKD-EPI) equation refit without adjustment for race.   • Hemoglobin A1C 06/01/2022 6.50 (A) 4.80 - 5.60 % Final   • Total Cholesterol 06/01/2022 184  0 - 200 mg/dL Final   • Triglycerides 06/01/2022 274 (A) 0 - 150 mg/dL Final   • HDL Cholesterol 06/01/2022 38 (A) 40 - 60 mg/dL Final   • LDL Cholesterol  06/01/2022 99  0 - 100 mg/dL Final   • VLDL Cholesterol 06/01/2022 47 (A) 5 - 40 mg/dL Final   • LDL/HDL Ratio 06/01/2022 2.40   Final   • TSH 06/01/2022 2.890  0.270 - 4.200 uIU/mL Final   • 25 Hydroxy, Vitamin D 06/01/2022 21.0 (A) 30.0 - 100.0 ng/ml Final   • Vitamin B-12 06/01/2022 547  211 - 946 pg/mL Final   • Iron 06/01/2022 74  37 - 145 mcg/dL Final   • Iron Saturation 06/01/2022 21  20 - 50 % Final   • Transferrin 06/01/2022 237  200 - 360 mg/dL Final   • TIBC 06/01/2022 353  298 - 536 mcg/dL Final   • Insulin 06/01/2022 96.0 (A) 2.6 - 24.9 uIU/mL Final   • WBC 06/01/2022 8.70  3.40 - 10.80 10*3/mm3 Final   • RBC 06/01/2022 4.63  3.77 - 5.28 10*6/mm3 Final   • Hemoglobin 06/01/2022 14.6  12.0 - 15.9 g/dL Final   • Hematocrit 06/01/2022 43.7  34.0 - 46.6 % Final   •  MCV 06/01/2022 94.4  79.0 - 97.0 fL Final   • MCH 06/01/2022 31.5  26.6 - 33.0 pg Final   • MCHC 06/01/2022 33.4  31.5 - 35.7 g/dL Final   • RDW 06/01/2022 12.1 (A) 12.3 - 15.4 % Final   • RDW-SD 06/01/2022 41.6  37.0 - 54.0 fl Final   • MPV 06/01/2022 10.7  6.0 - 12.0 fL Final   • Platelets 06/01/2022 270  140 - 450 10*3/mm3 Final   • Neutrophil % 06/01/2022 51.8  42.7 - 76.0 % Final   • Lymphocyte % 06/01/2022 33.9  19.6 - 45.3 % Final   • Monocyte % 06/01/2022 7.8  5.0 - 12.0 % Final   • Eosinophil % 06/01/2022 5.1  0.3 - 6.2 % Final   • Basophil % 06/01/2022 0.9  0.0 - 1.5 % Final   • Immature Grans % 06/01/2022 0.5  0.0 - 0.5 % Final   • Neutrophils, Absolute 06/01/2022 4.51  1.70 - 7.00 10*3/mm3 Final   • Lymphocytes, Absolute 06/01/2022 2.95  0.70 - 3.10 10*3/mm3 Final   • Monocytes, Absolute 06/01/2022 0.68  0.10 - 0.90 10*3/mm3 Final   • Eosinophils, Absolute 06/01/2022 0.44 (A) 0.00 - 0.40 10*3/mm3 Final   • Basophils, Absolute 06/01/2022 0.08  0.00 - 0.20 10*3/mm3 Final   • Immature Grans, Absolute 06/01/2022 0.04  0.00 - 0.05 10*3/mm3 Final   • nRBC 06/01/2022 0.1  0.0 - 0.2 /100 WBC Final   Office Visit on 02/10/2022   Component Date Value Ref Range Status   • Rapid Strep A Screen 02/10/2022 Negative  Negative, VALID, INVALID, Not Performed Final   • Internal Control 02/10/2022 Passed  Passed Final   • Lot Number 02/10/2022 [id number]   Final   • Expiration Date 02/10/2022 7/31/2023   Final   Lab on 10/18/2021   Component Date Value Ref Range Status   • TSPOTTB 10/18/2021 Negative  Negative Final   • T-SPOT Panel A 10/18/2021 0   Final   • T-SPOT Panel B 10/18/2021 1   Final   • TSPOT NIL CONTROL INTERP 10/18/2021 Passed   Final   • TSPOT POS CONTROL INTERP 10/18/2021 Passed   Final       EKG Results:  No orders to display       Imaging Results:  No Images in the past 120 days found..      Assessment & Plan   Diagnoses and all orders for this visit:    1. ADHD (attention deficit hyperactivity disorder),  inattentive type  -     amphetamine-dextroamphetamine XR (Adderall XR) 15 MG 24 hr capsule; Take 1 capsule by mouth Daily for 30 days  Dispense: 30 capsule; Refill: 0      Continue adderall xr 15mg po qday to target ADHD symptoms. 5 minutes of supportive psychotherapy with goal to strengthen defenses, promote problems solving, restore adaptive functioning and provide symptom relief. The therapeutic alliance was strengthened to encourage the patient to express their thoughts and feelings. Esteem building was enhanced through praise, reassurance, normalizing and encouragement. Coping skills were enhanced to build distress tolerance skills and emotional regulation. Patient given education on medication side effects, diagnosis/illness and relapse symptoms. Plan to continue supportive psychotherapy in next appointment to provide symptom relief. 3 month    Visit Diagnoses:    ICD-10-CM ICD-9-CM   1. ADHD (attention deficit hyperactivity disorder), inattentive type  F90.0 314.00       PLAN:  1. Safety: No acute safety concerns.   2. Referral: None at this time  3. Risk Assessment: Risk of self-harm acutely is moderate.  Risk factors include anxiety disorder, mood disorder, and recent psychosocial stressors (pandemic). Protective factors include no family history, denies access to guns/weapons, no present SI, no history of suicide attempts or self-harm in the past, minimal AODA, healthcare seeking, future orientation, willingness to engage in care.  Risk of self-harm chronically is also moderate, but could be further elevated in the event of treatment noncompliance and/or AODA.  4. Medications: CONTINUE adderall xr 15mg po qday to target ADHD symptoms. Risks, benefits, side effects discussed with patient including elevated heart rate, elevated blood pressure, irritability, insomnia, sexual dysfunction, appetite suppressing properties, psychosis.  After discussion of these risks and benefits, the patient voiced understanding  and agreed to proceed. UDS ordered, Jeovany reviewed.  5. Labs/studies: UDS 10/20/21 negative  6. Follow-up: 3 month    Patient screened positive for depression based on a PHQ-9 score of  on . Follow-up recommendations include: Suicide Risk Assessment performed.         TREATMENT PLAN/GOALS: Continue supportive psychotherapy efforts and medications as indicated. Treatment and medication options discussed during today's visit. Patient ackowledged and verbally consented to continue with current treatment plan and was educated on the importance of compliance with treatment and follow-up appointments.    MEDICATION ISSUES:  JEOVANY reviewed as expected.  Discussed medication options and treatment plan of prescribed medication as well as the risks, benefits, and side effects including potential falls, possible impaired driving and metabolic adversities among others. Patient is agreeable to call the office with any worsening of symptoms or onset of side effects. Patient is agreeable to call 911 or go to the nearest ER should he/she begin having SI/HI. No medication side effects or related complaints today.     MEDS ORDERED DURING VISIT:  New Medications Ordered This Visit   Medications   • amphetamine-dextroamphetamine XR (Adderall XR) 15 MG 24 hr capsule     Sig: Take 1 capsule by mouth Daily for 30 days     Dispense:  30 capsule     Refill:  0       Return in about 12 weeks (around 11/16/2022) for Next scheduled follow up.         This document has been electronically signed by YENNIFER Loyd  August 24, 2022 09:45 EDT      Part of this note may be an electronic transcription/translation of spoken language to printed text using the Dragon Dictation System.

## 2022-09-20 ENCOUNTER — OFFICE VISIT (OUTPATIENT)
Dept: FAMILY MEDICINE CLINIC | Facility: CLINIC | Age: 62
End: 2022-09-20

## 2022-09-20 VITALS
SYSTOLIC BLOOD PRESSURE: 150 MMHG | OXYGEN SATURATION: 97 % | BODY MASS INDEX: 34.69 KG/M2 | HEIGHT: 69 IN | WEIGHT: 234.2 LBS | TEMPERATURE: 97.2 F | HEART RATE: 104 BPM | DIASTOLIC BLOOD PRESSURE: 74 MMHG

## 2022-09-20 DIAGNOSIS — R03.0 ELEVATED BLOOD PRESSURE READING: ICD-10-CM

## 2022-09-20 DIAGNOSIS — M19.90 OSTEOARTHRITIS, UNSPECIFIED OSTEOARTHRITIS TYPE, UNSPECIFIED SITE: ICD-10-CM

## 2022-09-20 DIAGNOSIS — E88.81 INSULIN RESISTANCE: ICD-10-CM

## 2022-09-20 DIAGNOSIS — Z23 NEED FOR INFLUENZA VACCINATION: Primary | ICD-10-CM

## 2022-09-20 PROCEDURE — 99214 OFFICE O/P EST MOD 30 MIN: CPT | Performed by: NURSE PRACTITIONER

## 2022-09-20 PROCEDURE — 90686 IIV4 VACC NO PRSV 0.5 ML IM: CPT | Performed by: NURSE PRACTITIONER

## 2022-09-20 PROCEDURE — 90471 IMMUNIZATION ADMIN: CPT | Performed by: NURSE PRACTITIONER

## 2022-09-20 RX ORDER — IBUPROFEN 800 MG/1
800 TABLET ORAL EVERY 6 HOURS PRN
Qty: 90 TABLET | Refills: 0 | Status: SHIPPED | OUTPATIENT
Start: 2022-09-20

## 2022-09-20 RX ORDER — METFORMIN HYDROCHLORIDE 500 MG/1
500 TABLET, EXTENDED RELEASE ORAL
Qty: 90 TABLET | Refills: 1 | Status: SHIPPED | OUTPATIENT
Start: 2022-09-20 | End: 2022-11-03 | Stop reason: DRUGHIGH

## 2022-09-20 NOTE — PROGRESS NOTES
"Chief Complaint  Follow up for insulin levels as well as flu shot    Subjective         Ami Lewis presents to [facility]  She presents to the office today for 6-month follow-up regarding her insulin resistance.  I did explain that we would recheck her labs.  Patient has lost 8 pounds since starting the metformin.  Blood pressure is slightly elevated at 150/74.  She denies any chest pain shortness of breath palpitations at this time.  She does state that her blood pressure is normally good.  I did ask her to continue to monitor this daily.  She does request a flu shot today.  Patient also states that the pharmacy never received her ibuprofen and she would like to have that resent to the pharmacy.       Objective     Vitals:    09/20/22 0915   BP: 150/74   BP Location: Right arm   Patient Position: Sitting   Cuff Size: Adult   Pulse: 104   Temp: 97.2 °F (36.2 °C)   TempSrc: Temporal   SpO2: 97%   Weight: 106 kg (234 lb 3.2 oz)   Height: 175.3 cm (69\")      Body mass index is 34.59 kg/m².    BMI is >= 30 and <35. (Class 1 Obesity). The following options were offered after discussion;: nutrition counseling/recommendations        Physical Exam  Vitals reviewed.   Constitutional:       Appearance: Normal appearance.   Cardiovascular:      Rate and Rhythm: Normal rate and regular rhythm.      Pulses: Normal pulses.      Heart sounds: Normal heart sounds, S1 normal and S2 normal. No murmur heard.  Pulmonary:      Effort: Pulmonary effort is normal. No respiratory distress.      Breath sounds: Normal breath sounds.   Skin:     General: Skin is warm and dry.   Neurological:      Mental Status: She is alert and oriented to person, place, and time.   Psychiatric:         Attention and Perception: Attention normal.         Mood and Affect: Mood normal.         Behavior: Behavior normal.          Result Review :   The following data was reviewed by: YENNIFER Ortiz on " 09/20/2022:      Procedures    Assessment and Plan   Diagnoses and all orders for this visit:    1. Need for influenza vaccination (Primary)  -     FluLaval/Fluarix/Fluzone >6 Months    2. Insulin resistance  -     metFORMIN ER (GLUCOPHAGE-XR) 500 MG 24 hr tablet; Take 1 tablet by mouth Daily With Breakfast.  Dispense: 90 tablet; Refill: 1    3. Elevated blood pressure reading  Comments:  Continue monitoring blood pressure daily    4. Osteoarthritis, unspecified osteoarthritis type, unspecified site  -     ibuprofen (ADVIL,MOTRIN) 800 MG tablet; Take 1 tablet by mouth Every 6 (Six) Hours As Needed for Moderate Pain.  Dispense: 90 tablet; Refill: 0          Follow Up   Return in about 6 months (around 3/20/2023) for Recheck.  Patient was given instructions and counseling regarding her condition or for health maintenance advice. Please see specific information pulled into the AVS if appropriate.

## 2022-10-04 DIAGNOSIS — F90.0 ADHD (ATTENTION DEFICIT HYPERACTIVITY DISORDER), INATTENTIVE TYPE: Primary | ICD-10-CM

## 2022-10-05 NOTE — TELEPHONE ENCOUNTER
ATTEMPTED TO CONTACT PT PER PROVIDER'S INSTRUCTIONS      NO ANSWER      LEFT VOICEMAIL WITH INSTRUCTIONS TO RETURN CALL TO OFFICE AT [phone]

## 2022-10-05 NOTE — TELEPHONE ENCOUNTER
CONTROLLED MEDICATION REFILL REQUEST    STATE REGULATION APPT EVERY 3 MONTHS     UDS(URINE DRUG SCREEN) EVERY 6 MONTHS     NEW NARC CONSENT EVERY YEAR      URINE DRUG SCREEN:   Order Status Reason By On   Canceled Other Berta Herrera MA 7/1/22 4103   Incomplete - pt has not scheduled f/u appt, will need to be addressed when she schedules one      PREVIOUS ORDER CANCELLED VIA OFFICE STAFF, PER ABOVE LINKED INFORMATION     NO COMPLETED UDS(URINE DRUG SCREEN) ON FILE     UDS(URINE DRUG SCREEN) ORDER PENDED FOR PROVIDER REVIEW     LAST OFFICE VISIT: Telemedicine with Quincy Yap APRN (08/24/2022)      NARC CONSENT: NONE IN EPIC     NEXT OFFICE VISIT: NONE IN EPIC     MEDICATION: amphetamine-dextroamphetamine XR (Adderall XR) 15 MG 24 hr capsule (09/03/2022)

## 2022-10-06 RX ORDER — DEXTROAMPHETAMINE SACCHARATE, AMPHETAMINE ASPARTATE MONOHYDRATE, DEXTROAMPHETAMINE SULFATE AND AMPHETAMINE SULFATE 3.75; 3.75; 3.75; 3.75 MG/1; MG/1; MG/1; MG/1
15 CAPSULE, EXTENDED RELEASE ORAL DAILY
Qty: 30 CAPSULE | Refills: 0 | Status: SHIPPED | OUTPATIENT
Start: 2022-10-06 | End: 2022-10-10 | Stop reason: SDUPTHER

## 2022-10-06 NOTE — TELEPHONE ENCOUNTER
Attempted to contact pt to schedule f/u appt before authorizing refill. Pt did not answer, LVMTCB to schedule.

## 2022-10-06 NOTE — TELEPHONE ENCOUNTER
Pt called back in response to missed call. Pt was informed she needed a f/u appt on file before refill could be authorized. Pt scheduled for 10/10/2022 at 1420 via [facility]. [facility] video visit protocol was reminded to pt, to which she confirmed. Medication order pended with new UDS to be completed.

## 2022-10-07 ENCOUNTER — TELEPHONE (OUTPATIENT)
Dept: FAMILY MEDICINE CLINIC | Facility: CLINIC | Age: 62
End: 2022-10-07

## 2022-10-07 DIAGNOSIS — Z51.81 MEDICATION MONITORING ENCOUNTER: Primary | ICD-10-CM

## 2022-10-07 RX ORDER — NICOTINE 21 MG/24HR
PATCH, TRANSDERMAL 24 HOURS TRANSDERMAL
Qty: 14 PATCH | Refills: 0 | Status: SHIPPED | OUTPATIENT
Start: 2022-10-07 | End: 2022-11-18

## 2022-10-07 NOTE — TELEPHONE ENCOUNTER
Was there supposed to be an RX for nicotine patches sent to the pharmacy?     I ordered the labs for the pt

## 2022-10-07 NOTE — TELEPHONE ENCOUNTER
----- Message from Ami Lewis sent at 10/6/2022  6:32 PM EDT -----  Regarding: orders  I was to have a prescription for Nicotine patches sent, but the pharmacy has not received it. I was also to have blood drawn to check my insulin levels and they have not received that order.  Thank You,  Ami Lewis

## 2022-10-07 NOTE — TELEPHONE ENCOUNTER
Per Royal, I sent them to the pharmacy.  I thought I had done this previously but unsure what happened.         Sent [employer] message

## 2022-10-10 ENCOUNTER — TELEMEDICINE (OUTPATIENT)
Dept: PSYCHIATRY | Facility: CLINIC | Age: 62
End: 2022-10-10

## 2022-10-10 ENCOUNTER — TELEPHONE (OUTPATIENT)
Dept: PSYCHIATRY | Facility: CLINIC | Age: 62
End: 2022-10-10

## 2022-10-10 DIAGNOSIS — F90.0 ADHD (ATTENTION DEFICIT HYPERACTIVITY DISORDER), INATTENTIVE TYPE: Primary | ICD-10-CM

## 2022-10-10 PROCEDURE — 99213 OFFICE O/P EST LOW 20 MIN: CPT | Performed by: NURSE PRACTITIONER

## 2022-10-10 RX ORDER — DEXTROAMPHETAMINE SACCHARATE, AMPHETAMINE ASPARTATE MONOHYDRATE, DEXTROAMPHETAMINE SULFATE AND AMPHETAMINE SULFATE 3.75; 3.75; 3.75; 3.75 MG/1; MG/1; MG/1; MG/1
15 CAPSULE, EXTENDED RELEASE ORAL DAILY
Qty: 30 CAPSULE | Refills: 0 | Status: SHIPPED | OUTPATIENT
Start: 2022-11-04 | End: 2022-11-11

## 2022-10-10 NOTE — PROGRESS NOTES
Subjective   Ami Lewis is a 61 y.o. female who presents today for follow up.   Mode of visit: Video  Location of provider: [address], [address], [place] [address].  Location of patient: Home  Does the patient consent to use a video/audio connection for your medical care today? Yes  The visit included audio and video interaction. No technical issues occurred during this visit.  This provider is located at [address], [address] in [place], [place].  Chief Complaint:  ADHD    History of Present Illness:     ADHD: focus and concentration have improved. Able to complete tasks effectively. Grades are good.     11/17/21 Patient had evaluation for ADHD on November 4, 2021 by Phill Bustos a licensed clinical psychologist.  Patient was found to be criteria for adult attention deficit hyperactivity disorder, combined.  Symptoms included difficulties with taking college courses, her mind wandering, feeling overwhelmed and maintaining her interest.  On the adult ADHD self-report scale symptom checklist, patient indicated chronic difficulties with attention to details, organizing tasks, avoiding tasks requiring a lot of thoughts and fidgeting and squirming.  Her symptoms highly consistent with ADHD in adults.  Also acknowledged the majority of the listed symptoms of ADHD in the DSM-V.    Psychiatric Review of Systems: Denies symptoms of psychosis, fredrick or PTSD      PHQ-9 Depression Screening  PHQ-9 Total Score:      Little interest or pleasure in doing things?     Feeling down, depressed, or hopeless?     Trouble falling or staying asleep, or sleeping too much?     Feeling tired or having little energy?     Poor appetite or overeating?     Feeling bad about yourself - or that you are a failure or have let yourself or your family down?     Trouble concentrating on things, such as reading the newspaper or watching television?     Moving or speaking so slowly that other people could have  noticed? Or the opposite - being so fidgety or restless that you have been moving around a lot more than usual?     Thoughts that you would be better off dead, or of hurting yourself in some way?     PHQ-9 Total Score       ALEXA-7  Feeling nervous, anxious or on edge: More than half the days  Not being able to stop or control worrying: Not at all  Worrying too much about different things: Not at all  Trouble Relaxing: More than half the days  Being so restless that it is hard to sit still: Several days  Feeling afraid as if something awful might happen: Not at all  Becoming easily annoyed or irritable: Not at all  ALEXA 7 Total Score: 5  If you checked any problems, how difficult have these problems made it for you to do your work, take care of things at home, or get along with other people: Not difficult at all    Past Surgical History:  Past Surgical History:   Procedure Laterality Date   •  SECTION     • CLUB FOOT RELEASE     • TUBAL ABDOMINAL LIGATION         Problem List:  Patient Active Problem List   Diagnosis   • Anxiety   • Deafness   • Depression   • Head injury   • Primary localized osteoarthrosis, ankle and foot       Allergy:   Allergies   Allergen Reactions   • Codeine Itching   • Flexeril [Cyclobenzaprine] Nausea And Vomiting   • Sulfa Antibiotics Hives        Discontinued Medications:  Medications Discontinued During This Encounter   Medication Reason   • amphetamine-dextroamphetamine XR (Adderall XR) 15 MG 24 hr capsule Reorder       Current Medications:   Current Outpatient Medications   Medication Sig Dispense Refill   • [START ON 2022] amphetamine-dextroamphetamine XR (Adderall XR) 15 MG 24 hr capsule Take 1 capsule by mouth Daily for 30 days 30 capsule 0   • ibuprofen (ADVIL,MOTRIN) 800 MG tablet Take 1 tablet by mouth Every 6 (Six) Hours As Needed for Moderate Pain. 90 tablet 0   • metFORMIN ER (GLUCOPHAGE-XR) 500 MG 24 hr tablet Take 1 tablet by mouth Daily With Breakfast. 90 tablet  "1   • nicotine (NICODERM CQ) 21 MG/24HR patch Place 21 mg on the skin as directed by provider Daily for 14 days, THEN 14 mg Daily for 14 days, THEN 7 mg Daily for 14 days. 14 patch 0   • vitamin D (ERGOCALCIFEROL) 1.25 MG (05827 UT) capsule capsule Take 1 capsule by mouth Every 7 (Seven) Days. 13 capsule 1     No current facility-administered medications for this visit.       Past Medical History:  Past Medical History:   Diagnosis Date   • ADHD (attention deficit hyperactivity disorder)    • Anxiety    • Depression    • Diabetes mellitus (HCC) May 2022       Past Psychiatric History:  Medication Trials: Zoloft, wellbutrin      Substance Abuse History:   Types: Denies    Access to Firearms: Denies    Social History     Socioeconomic History   • Marital status:    Tobacco Use   • Smoking status: Every Day     Packs/day: 0.50     Years: 10.00     Pack years: 5.00     Types: Cigarettes   • Smokeless tobacco: Never   Vaping Use   • Vaping Use: Never used   Substance and Sexual Activity   • Alcohol use: Never   • Drug use: Never   • Sexual activity: Defer         Family History   Problem Relation Age of Onset   • Breast cancer Mother    • Cervical cancer Mother    • COPD Mother    • Lung cancer Mother    • Colon polyps Mother    • Diabetes Paternal Grandmother            Mental Status Exam:     Appearance: good eye contact, normal street clothes, groomed, sitting in chair   Behavior: pleasant and cooperative  Motor: no abnormal  Speech: normal rhythm, rate, volume, tone, not hyperverbal, not pressured, normal prosidy  Mood: \"\"Okay\"  Affect: euthymic  Thought Content: negative suicidal ideations, negative homicidal ideations, negative obsessions  Perceptions: negative auditory hallucinations, negative visual hallucinations, negative delusions, negative paranoia  Thought Process: goal directed, linear  Insight/Judgement: fair/fair  Cognition: grossly intact  Attention: intact  Orientation: person, place, time and " situation  Memory: intact    Review of Systems:     Constitutional: Denies fatigue, night sweats  Eyes: Denies double vision, blurred vision  HENT: Denies vertigo, recent head injury  Cardiovascular: Denies chest pain, irregular heartbeats  Respiratory: Denies productive cough, shortness of breath  Gastrointestinal: Denies nausea, vomiting  Genitourinary: Denies dysuria, urinary retention  Integument: Denies hair growth change, new skin lesions  Neurologic: Denies altered mental status, seizures  Musculoskeletal: Denies joint swelling, limitation of motion  Endocrine: Denies cold intolerance, heat intolerance  Psychiatric: Admits depression, attention issues. Denies fredrick, post-traumatic stress disorder, obsessive compulsive disorder, psychosis.   Allergic-immunologic: Denies frequent illnesses      Vital Signs:   There were no vitals taken for this visit.     Lab Results:   Admission on 06/28/2022, Discharged on 06/28/2022   Component Date Value Ref Range Status   • Color 06/28/2022 Yellow  Yellow, Straw, Dark Yellow, Donna Final   • Clarity, UA 06/28/2022 Clear  Clear Final   • Glucose, UA 06/28/2022 Negative  Negative mg/dL Final   • Bilirubin 06/28/2022 Negative  Negative Final   • Ketones, UA 06/28/2022 Negative  Negative Final   • Specific Gravity  06/28/2022 1.025  1.005 - 1.030 Final   • Blood, UA 06/28/2022 Moderate (A)  Negative Final   • pH, Urine 06/28/2022 6.0  5.0 - 8.0 Final   • Protein, POC 06/28/2022 2+ (A)  Negative mg/dL Final   • Urobilinogen, UA 06/28/2022 4 E.U./dL (A)  Normal Final   • Nitrite, UA 06/28/2022 Negative  Negative Final   • Leukocytes 06/28/2022 Negative  Negative Final   • Rapid Strep A Screen 06/28/2022 Negative  Negative, VALID, INVALID, Not Performed Final   • Internal Control 06/28/2022 Passed  Passed Final   • Lot Number 06/28/2022 [id number]   Final   • Expiration Date 06/28/2022 [id number]   Final   • Rapid Influenza A Ag 06/28/2022 Negative  Negative Final   • Rapid  Influenza B Ag 06/28/2022 Negative  Negative Final   • Internal Control 06/28/2022 Passed  Passed Final   • Lot Number 06/28/2022 [id number]   Final   • Expiration Date 06/28/2022 [id number]   Final   • SARS Antigen 06/28/2022 Detected (A)  Not Detected, Presumptive Negative Final   • Internal Control 06/28/2022 Passed  Passed Final   • Lot Number 06/28/2022 [id number]   Final   • Expiration Date 06/28/2022 [id number]   Final   Lab on 06/01/2022   Component Date Value Ref Range Status   • Glucose 06/01/2022 99  65 - 99 mg/dL Final   • BUN 06/01/2022 16  8 - 23 mg/dL Final   • Creatinine 06/01/2022 0.69  0.57 - 1.00 mg/dL Final   • Sodium 06/01/2022 144  136 - 145 mmol/L Final   • Potassium 06/01/2022 3.9  3.5 - 5.2 mmol/L Final   • Chloride 06/01/2022 109 (H)  98 - 107 mmol/L Final   • CO2 06/01/2022 23.3  22.0 - 29.0 mmol/L Final   • Calcium 06/01/2022 9.4  8.6 - 10.5 mg/dL Final   • Total Protein 06/01/2022 7.1  6.0 - 8.5 g/dL Final   • Albumin 06/01/2022 4.00  3.50 - 5.20 g/dL Final   • ALT (SGPT) 06/01/2022 52 (H)  1 - 33 U/L Final   • AST (SGOT) 06/01/2022 46 (H)  1 - 32 U/L Final   • Alkaline Phosphatase 06/01/2022 105  39 - 117 U/L Final   • Total Bilirubin 06/01/2022 0.2  0.0 - 1.2 mg/dL Final   • Globulin 06/01/2022 3.1  gm/dL Final   • A/G Ratio 06/01/2022 1.3  g/dL Final   • BUN/Creatinine Ratio 06/01/2022 23.2  7.0 - 25.0 Final   • Anion Gap 06/01/2022 11.7  5.0 - 15.0 mmol/L Final   • eGFR 06/01/2022 99.5  >60.0 mL/min/1.73 Final    National Kidney Foundation and American Society of Nephrology (ASN) Task Force recommended calculation based on the Chronic Kidney Disease Epidemiology Collaboration (CKD-EPI) equation refit without adjustment for race.   • Hemoglobin A1C 06/01/2022 6.50 (H)  4.80 - 5.60 % Final   • Total Cholesterol 06/01/2022 184  0 - 200 mg/dL Final   • Triglycerides 06/01/2022 274 (H)  0 - 150 mg/dL Final   • HDL Cholesterol 06/01/2022 38 (L)  40 - 60 mg/dL Final   • LDL Cholesterol  06/01/2022  99  0 - 100 mg/dL Final   • VLDL Cholesterol 06/01/2022 47 (H)  5 - 40 mg/dL Final   • LDL/HDL Ratio 06/01/2022 2.40   Final   • TSH 06/01/2022 2.890  0.270 - 4.200 uIU/mL Final   • 25 Hydroxy, Vitamin D 06/01/2022 21.0 (L)  30.0 - 100.0 ng/ml Final   • Vitamin B-12 06/01/2022 547  211 - 946 pg/mL Final   • Iron 06/01/2022 74  37 - 145 mcg/dL Final   • Iron Saturation 06/01/2022 21  20 - 50 % Final   • Transferrin 06/01/2022 237  200 - 360 mg/dL Final   • TIBC 06/01/2022 353  298 - 536 mcg/dL Final   • Insulin 06/01/2022 96.0 (H)  2.6 - 24.9 uIU/mL Final   • WBC 06/01/2022 8.70  3.40 - 10.80 10*3/mm3 Final   • RBC 06/01/2022 4.63  3.77 - 5.28 10*6/mm3 Final   • Hemoglobin 06/01/2022 14.6  12.0 - 15.9 g/dL Final   • Hematocrit 06/01/2022 43.7  34.0 - 46.6 % Final   • MCV 06/01/2022 94.4  79.0 - 97.0 fL Final   • MCH 06/01/2022 31.5  26.6 - 33.0 pg Final   • MCHC 06/01/2022 33.4  31.5 - 35.7 g/dL Final   • RDW 06/01/2022 12.1 (L)  12.3 - 15.4 % Final   • RDW-SD 06/01/2022 41.6  37.0 - 54.0 fl Final   • MPV 06/01/2022 10.7  6.0 - 12.0 fL Final   • Platelets 06/01/2022 270  140 - 450 10*3/mm3 Final   • Neutrophil % 06/01/2022 51.8  42.7 - 76.0 % Final   • Lymphocyte % 06/01/2022 33.9  19.6 - 45.3 % Final   • Monocyte % 06/01/2022 7.8  5.0 - 12.0 % Final   • Eosinophil % 06/01/2022 5.1  0.3 - 6.2 % Final   • Basophil % 06/01/2022 0.9  0.0 - 1.5 % Final   • Immature Grans % 06/01/2022 0.5  0.0 - 0.5 % Final   • Neutrophils, Absolute 06/01/2022 4.51  1.70 - 7.00 10*3/mm3 Final   • Lymphocytes, Absolute 06/01/2022 2.95  0.70 - 3.10 10*3/mm3 Final   • Monocytes, Absolute 06/01/2022 0.68  0.10 - 0.90 10*3/mm3 Final   • Eosinophils, Absolute 06/01/2022 0.44 (H)  0.00 - 0.40 10*3/mm3 Final   • Basophils, Absolute 06/01/2022 0.08  0.00 - 0.20 10*3/mm3 Final   • Immature Grans, Absolute 06/01/2022 0.04  0.00 - 0.05 10*3/mm3 Final   • nRBC 06/01/2022 0.1  0.0 - 0.2 /100 WBC Final   Office Visit on 02/10/2022   Component Date Value  Ref Range Status   • Rapid Strep A Screen 02/10/2022 Negative  Negative, VALID, INVALID, Not Performed Final   • Internal Control 02/10/2022 Passed  Passed Final   • Lot Number 02/10/2022 [id number]   Final   • Expiration Date 02/10/2022 7/31/2023   Final   Lab on 10/18/2021   Component Date Value Ref Range Status   • TSPOTTB 10/18/2021 Negative  Negative Final   • T-SPOT Panel A 10/18/2021 0   Final   • T-SPOT Panel B 10/18/2021 1   Final   • TSPOT NIL CONTROL INTERP 10/18/2021 Passed   Final   • TSPOT POS CONTROL INTERP 10/18/2021 Passed   Final       EKG Results:  No orders to display       Imaging Results:  No Images in the past 120 days found..      Assessment & Plan   Diagnoses and all orders for this visit:    1. ADHD (attention deficit hyperactivity disorder), inattentive type (Primary)  -     amphetamine-dextroamphetamine XR (Adderall XR) 15 MG 24 hr capsule; Take 1 capsule by mouth Daily for 30 days  Dispense: 30 capsule; Refill: 0      Continue adderall xr 15mg po qday to target ADHD symptoms. 5 minutes of supportive psychotherapy with goal to strengthen defenses, promote problems solving, restore adaptive functioning and provide symptom relief. The therapeutic alliance was strengthened to encourage the patient to express their thoughts and feelings. Esteem building was enhanced through praise, reassurance, normalizing and encouragement. Coping skills were enhanced to build distress tolerance skills and emotional regulation. Patient given education on medication side effects, diagnosis/illness and relapse symptoms. Plan to continue supportive psychotherapy in next appointment to provide symptom relief. 3 month    Visit Diagnoses:    ICD-10-CM ICD-9-CM   1. ADHD (attention deficit hyperactivity disorder), inattentive type  F90.0 314.00       PLAN:  1. Safety: No acute safety concerns.   2. Referral: None at this time  3. Risk Assessment: Risk of self-harm acutely is moderate.  Risk factors include anxiety  disorder, mood disorder, and recent psychosocial stressors (pandemic). Protective factors include no family history, denies access to guns/weapons, no present SI, no history of suicide attempts or self-harm in the past, minimal AODA, healthcare seeking, future orientation, willingness to engage in care.  Risk of self-harm chronically is also moderate, but could be further elevated in the event of treatment noncompliance and/or AODA.  4. Medications: CONTINUE adderall xr 15mg po qday to target ADHD symptoms. Risks, benefits, side effects discussed with patient including elevated heart rate, elevated blood pressure, irritability, insomnia, sexual dysfunction, appetite suppressing properties, psychosis.  After discussion of these risks and benefits, the patient voiced understanding and agreed to proceed. UDS ordered, Jeovany reviewed.  5. Labs/studies: UDS 10/20/21 negative; UDS  6. Follow-up: 3 month    Patient screened positive for depression based on a PHQ-9 score of  on . Follow-up recommendations include: Suicide Risk Assessment performed.         TREATMENT PLAN/GOALS: Continue supportive psychotherapy efforts and medications as indicated. Treatment and medication options discussed during today's visit. Patient ackowledged and verbally consented to continue with current treatment plan and was educated on the importance of compliance with treatment and follow-up appointments.    MEDICATION ISSUES:  JEOVANY reviewed as expected.  Discussed medication options and treatment plan of prescribed medication as well as the risks, benefits, and side effects including potential falls, possible impaired driving and metabolic adversities among others. Patient is agreeable to call the office with any worsening of symptoms or onset of side effects. Patient is agreeable to call 911 or go to the nearest ER should he/she begin having SI/HI. No medication side effects or related complaints today.     MEDS ORDERED DURING VISIT:  New  Medications Ordered This Visit   Medications   • amphetamine-dextroamphetamine XR (Adderall XR) 15 MG 24 hr capsule     Sig: Take 1 capsule by mouth Daily for 30 days     Dispense:  30 capsule     Refill:  0       Return in about 4 weeks (around 11/7/2022) for Next scheduled follow up.         This document has been electronically signed by YENNIFER Loyd  October 10, 2022 14:30 EDT      Part of this note may be an electronic transcription/translation of spoken language to printed text using the Dragon Dictation System.

## 2022-10-10 NOTE — TREATMENT PLAN
Multi-Disciplinary Problems (from Behavioral Health Treatment Plan)    Active Problems     Problem: ADHD (Adult)  Start Date: 10/10/22    Problem Details: The patient self-scales this problem as a 3 with 10 being the worst.      Goal Priority Start Date Expected End Date End Date    Patient will sustain attention and concentration to complete chores, and work responsibilites and increase positive interaction in all relationships. -- 10/10/22 -- --    Goal Details: Progress toward goal:  Not appropriate to rate progress toward goal since this is the initial treatment plan.      Goal Intervention Frequency Start Date End Date    Assist patient in setting responsible goals and breaking down large tasks. Weekly 10/10/22 --    Intervention Details: Duration of treatment until until remission of symptoms.      Goal Intervention Frequency Start Date End Date    Assist patient in using self monitoring checklist to improve attention, work performance, and social skills. Weekly 10/10/22 --    Intervention Details: Duration of treatment until until remission of symptoms.                  Reviewed By     Quincy Yap APRN 10/10/22 [id number]                 I have discussed and reviewed this treatment plan with the patient.

## 2022-10-13 NOTE — TELEPHONE ENCOUNTER
"CALLED PT TO SCHEDULE FOLLOW UP,PT STATED \"I THOUGHT WE ALREADY DID THAT BUT I AM DRIVING CAN I CALL BACK LATER\" PT IS NOW RESPONSIBLE TO CALL BACK AND SCHEDULE FOLLOW UP APPT WITH YASSINE HICKS   "

## 2022-11-01 ENCOUNTER — LAB (OUTPATIENT)
Dept: LAB | Facility: HOSPITAL | Age: 62
End: 2022-11-01

## 2022-11-01 DIAGNOSIS — Z51.81 MEDICATION MONITORING ENCOUNTER: ICD-10-CM

## 2022-11-01 DIAGNOSIS — F90.0 ADHD (ATTENTION DEFICIT HYPERACTIVITY DISORDER), INATTENTIVE TYPE: ICD-10-CM

## 2022-11-01 LAB
ALBUMIN SERPL-MCNC: 4 G/DL (ref 3.5–5.2)
ALBUMIN/GLOB SERPL: 1.3 G/DL
ALP SERPL-CCNC: 90 U/L (ref 39–117)
ALT SERPL W P-5'-P-CCNC: 51 U/L (ref 1–33)
AMPHET+METHAMPHET UR QL: POSITIVE
ANION GAP SERPL CALCULATED.3IONS-SCNC: 12.3 MMOL/L (ref 5–15)
AST SERPL-CCNC: 49 U/L (ref 1–32)
BARBITURATES UR QL SCN: NEGATIVE
BASOPHILS # BLD AUTO: 0.1 10*3/MM3 (ref 0–0.2)
BASOPHILS NFR BLD AUTO: 1.2 % (ref 0–1.5)
BENZODIAZ UR QL SCN: NEGATIVE
BILIRUB SERPL-MCNC: 0.2 MG/DL (ref 0–1.2)
BUN SERPL-MCNC: 13 MG/DL (ref 8–23)
BUN/CREAT SERPL: 16.7 (ref 7–25)
CALCIUM SPEC-SCNC: 9.4 MG/DL (ref 8.6–10.5)
CANNABINOIDS SERPL QL: NEGATIVE
CHLORIDE SERPL-SCNC: 103 MMOL/L (ref 98–107)
CO2 SERPL-SCNC: 26.7 MMOL/L (ref 22–29)
COCAINE UR QL: NEGATIVE
CREAT SERPL-MCNC: 0.78 MG/DL (ref 0.57–1)
DEPRECATED RDW RBC AUTO: 41.3 FL (ref 37–54)
EGFRCR SERPLBLD CKD-EPI 2021: 86.5 ML/MIN/1.73
EOSINOPHIL # BLD AUTO: 0.41 10*3/MM3 (ref 0–0.4)
EOSINOPHIL NFR BLD AUTO: 4.9 % (ref 0.3–6.2)
ERYTHROCYTE [DISTWIDTH] IN BLOOD BY AUTOMATED COUNT: 12 % (ref 12.3–15.4)
GLOBULIN UR ELPH-MCNC: 3.2 GM/DL
GLUCOSE SERPL-MCNC: 114 MG/DL (ref 65–99)
HBA1C MFR BLD: 6.2 % (ref 4.8–5.6)
HCT VFR BLD AUTO: 41.9 % (ref 34–46.6)
HGB BLD-MCNC: 14.3 G/DL (ref 12–15.9)
IMM GRANULOCYTES # BLD AUTO: 0.02 10*3/MM3 (ref 0–0.05)
IMM GRANULOCYTES NFR BLD AUTO: 0.2 % (ref 0–0.5)
LYMPHOCYTES # BLD AUTO: 2.97 10*3/MM3 (ref 0.7–3.1)
LYMPHOCYTES NFR BLD AUTO: 35.8 % (ref 19.6–45.3)
MCH RBC QN AUTO: 31.8 PG (ref 26.6–33)
MCHC RBC AUTO-ENTMCNC: 34.1 G/DL (ref 31.5–35.7)
MCV RBC AUTO: 93.3 FL (ref 79–97)
METHADONE UR QL SCN: NEGATIVE
MONOCYTES # BLD AUTO: 0.81 10*3/MM3 (ref 0.1–0.9)
MONOCYTES NFR BLD AUTO: 9.8 % (ref 5–12)
NEUTROPHILS NFR BLD AUTO: 3.98 10*3/MM3 (ref 1.7–7)
NEUTROPHILS NFR BLD AUTO: 48.1 % (ref 42.7–76)
NRBC BLD AUTO-RTO: 0 /100 WBC (ref 0–0.2)
OPIATES UR QL: NEGATIVE
OXYCODONE UR QL SCN: NEGATIVE
PLATELET # BLD AUTO: 286 10*3/MM3 (ref 140–450)
PMV BLD AUTO: 10.4 FL (ref 6–12)
POTASSIUM SERPL-SCNC: 3.8 MMOL/L (ref 3.5–5.2)
PROT SERPL-MCNC: 7.2 G/DL (ref 6–8.5)
RBC # BLD AUTO: 4.49 10*6/MM3 (ref 3.77–5.28)
SODIUM SERPL-SCNC: 142 MMOL/L (ref 136–145)
WBC NRBC COR # BLD: 8.29 10*3/MM3 (ref 3.4–10.8)

## 2022-11-01 PROCEDURE — 80307 DRUG TEST PRSMV CHEM ANLYZR: CPT

## 2022-11-01 PROCEDURE — 80053 COMPREHEN METABOLIC PANEL: CPT

## 2022-11-01 PROCEDURE — 83036 HEMOGLOBIN GLYCOSYLATED A1C: CPT

## 2022-11-01 PROCEDURE — 83525 ASSAY OF INSULIN: CPT

## 2022-11-01 PROCEDURE — 36415 COLL VENOUS BLD VENIPUNCTURE: CPT

## 2022-11-01 PROCEDURE — 85025 COMPLETE CBC W/AUTO DIFF WBC: CPT

## 2022-11-02 LAB — INSULIN SERPL-ACNC: 90 UIU/ML (ref 2.6–24.9)

## 2022-11-03 ENCOUNTER — TELEPHONE (OUTPATIENT)
Dept: FAMILY MEDICINE CLINIC | Facility: CLINIC | Age: 62
End: 2022-11-03

## 2022-11-03 DIAGNOSIS — E88.81 INSULIN RESISTANCE: ICD-10-CM

## 2022-11-03 NOTE — TELEPHONE ENCOUNTER
----- Message from YENNIFER Ortiz sent at 11/2/2022  4:05 PM EDT -----  Since insulin levels remain elevated.  Her A1c is 6.2%.  Please see if she has been taking the metformin daily.  If she has we need to increase the dosage to reduce the insulin levels.  Liver enzymes remain about the same.  We will continue to monitor this.

## 2022-11-03 NOTE — TELEPHONE ENCOUNTER
Informed pt of results, she is taking her metformin daily, told her to take 2 daily and we would send in a new rx for 1000 mg daily.

## 2022-11-04 ENCOUNTER — TELEPHONE (OUTPATIENT)
Dept: PSYCHIATRY | Facility: CLINIC | Age: 62
End: 2022-11-04

## 2022-11-04 RX ORDER — METFORMIN HYDROCHLORIDE 500 MG/1
1000 TABLET, EXTENDED RELEASE ORAL
Qty: 180 TABLET | Refills: 1 | Status: SHIPPED | OUTPATIENT
Start: 2022-11-04

## 2022-11-07 NOTE — TELEPHONE ENCOUNTER
CALLED PATIENT TO NOTIFY THAT HER PA HAD BEEN APPROVED.  LEFT VOICEMAIL TO CALL BACK IF SHE HAD ANY QUESTIONS

## 2022-11-08 NOTE — TELEPHONE ENCOUNTER
PRIOR AUTHORIZATION ADDERALL APPROVAL    BEHAVIORAL HEALTH - SCAN - ADDERALL PA APPROVAL, [facility], 11/04/2022 (11/04/2022)

## 2022-11-11 ENCOUNTER — TELEPHONE (OUTPATIENT)
Dept: PSYCHIATRY | Facility: CLINIC | Age: 62
End: 2022-11-11

## 2022-11-11 DIAGNOSIS — F90.0 ADHD (ATTENTION DEFICIT HYPERACTIVITY DISORDER), INATTENTIVE TYPE: Primary | ICD-10-CM

## 2022-11-11 RX ORDER — DEXTROAMPHETAMINE SACCHARATE, AMPHETAMINE ASPARTATE, DEXTROAMPHETAMINE SULFATE AND AMPHETAMINE SULFATE 2.5; 2.5; 2.5; 2.5 MG/1; MG/1; MG/1; MG/1
15 TABLET ORAL DAILY
Qty: 45 TABLET | Refills: 0 | Status: SHIPPED | OUTPATIENT
Start: 2022-11-11 | End: 2022-12-29 | Stop reason: SDUPTHER

## 2022-11-11 NOTE — TELEPHONE ENCOUNTER
PT(PATIENT) VERIFIED     amphetamine-dextroamphetamine XR (Adderall XR) 15 MG 24 hr capsule (2022)    PT(PATIENT) STATES THE PHARMACY IS OUT OF THE ADDERALL 5MG BUT DOES HAVE 10MG IN STOCK     PT(PATIENT) IS REQUESTING TO HAVE 10MG SENT IN WITH INSTRUCTIONS TO TAKE HALF A TABLET     PT(PATIENT) STATES SHE NEEDS THE MEDICATION BECAUSE IT HELPS HER WITH SCHOOL    PT(PATIENT) CONFIRMED PHARMACY AS CORRECT    PROVIDER PLEASE ADVISE

## 2022-11-11 NOTE — TELEPHONE ENCOUNTER
PT(PATIENT) VERIFIED     CONTACTED PT(PATIENT) PER PROVIDER'S INSTRUCTIONS    Quincy Yap APRN  [facility] 3 minutes ago (12:33 PM)     Cannot break XR in half      PT(PATIENT) STATES THE PHARMACY TOLD HER SHE COULD BREAK THEM IN HALF     PT(PATIENT) STATES SHE NEEDS HER MEDICATION OR SHE IS OPEN TO AN ALTERNATIVE, BECAUSE SHE HAS ADD    PT(PATIENT) IS REQUESTING AN UPDATE ON MEDICATION FROM PROVIDER    PLEASE ADVISE

## 2022-11-11 NOTE — TELEPHONE ENCOUNTER
Spoke with patient. Due to shortage at pharmacy, switch adderall xr 15mg to adderall ir 15mg po qday to target ADHD. Risks, benefits, side effects discussed with patient including elevated heart rate, elevated blood pressure, irritability, insomnia, sexual dysfunction, appetite suppressing properties, psychosis.  After discussion of these risks and benefits, the patient voiced understanding and agreed to proceed. Irvin reviewed, UDS ordered, and controlled substance agreement signed & witnessed.

## 2022-11-28 ENCOUNTER — TELEPHONE (OUTPATIENT)
Dept: FAMILY MEDICINE CLINIC | Facility: CLINIC | Age: 62
End: 2022-11-28

## 2022-11-28 NOTE — TELEPHONE ENCOUNTER
HUB TO READ AND SHARE  LMTCB seeing if patient would like to reschedule appointment from 11/30/22

## 2022-12-29 DIAGNOSIS — F90.0 ADHD (ATTENTION DEFICIT HYPERACTIVITY DISORDER), INATTENTIVE TYPE: ICD-10-CM

## 2022-12-29 RX ORDER — DEXTROAMPHETAMINE SACCHARATE, AMPHETAMINE ASPARTATE, DEXTROAMPHETAMINE SULFATE AND AMPHETAMINE SULFATE 2.5; 2.5; 2.5; 2.5 MG/1; MG/1; MG/1; MG/1
15 TABLET ORAL DAILY
Qty: 45 TABLET | Refills: 0 | Status: SHIPPED | OUTPATIENT
Start: 2022-12-29 | End: 2023-01-29

## 2022-12-29 NOTE — TELEPHONE ENCOUNTER
Medication refill request for Adderall 10mg.     Pt has upcoming appt on 02/03/2023.     Medication order pended.

## 2023-01-20 PROCEDURE — U0004 COV-19 TEST NON-CDC HGH THRU: HCPCS | Performed by: NURSE PRACTITIONER

## 2023-02-03 ENCOUNTER — TELEMEDICINE (OUTPATIENT)
Dept: PSYCHIATRY | Facility: CLINIC | Age: 63
End: 2023-02-03
Payer: COMMERCIAL

## 2023-02-03 DIAGNOSIS — F90.0 ADHD (ATTENTION DEFICIT HYPERACTIVITY DISORDER), INATTENTIVE TYPE: Primary | ICD-10-CM

## 2023-02-03 PROCEDURE — 99213 OFFICE O/P EST LOW 20 MIN: CPT | Performed by: NURSE PRACTITIONER

## 2023-02-03 PROCEDURE — 90833 PSYTX W PT W E/M 30 MIN: CPT | Performed by: NURSE PRACTITIONER

## 2023-02-03 RX ORDER — DEXTROAMPHETAMINE SACCHARATE, AMPHETAMINE ASPARTATE, DEXTROAMPHETAMINE SULFATE AND AMPHETAMINE SULFATE 2.5; 2.5; 2.5; 2.5 MG/1; MG/1; MG/1; MG/1
15 TABLET ORAL DAILY
COMMUNITY
End: 2023-02-03

## 2023-02-03 NOTE — PROGRESS NOTES
Subjective   Ami Lewis is a 62 y.o. female who presents today for follow up.   Mode of visit: Video  Location of provider: [address] [place], [place] [address], [address], [place] [address].  Location of patient: Home  Does the patient consent to use a video/audio connection for your medical care today? Yes  The visit included audio and video interaction. No technical issues occurred during this visit.  This provider is located at [address] [facility], [address] in [place], [place].    Chief Complaint:  ADHD    History of Present Illness:     ADHD: feels as though wearing off  Inattention:   Often fails to give close attention to details or makes careless mistakes in schoolwork, at work, or during other activities- y  Often has difficulty sustaining attention in tasks- y  Often does not seem to listen when spoken to directly- y  Often does not follow through on instructions and fails to finish duties in the workplace- y  Often has difficulty organizing tasks and activities- y  Often avoids, dislikes or is reluctant to engage in tasks that require sustained mental effort- y  Often loses things necessary for tasks or activities- y  Is often easily distracted by extraneous stimuli- y  Is often forgetful in daily activities- y  Hyperactivity and Impulsivity:   Often fidgets with or taps hands or feet-y  Often leaves seat in situations when remaining seated is expected- n  Often feels restless- n  Is often “on the go”, acting as if “driven by a motor”- n  Often talks excessively- n  Often blurts out an answer before a question has been completed- n  Often has difficulty waiting their turn- n  Often interrupts or intrudes on others- n    11/17/21 Patient had evaluation for ADHD on November 4, 2021 by Phill Bustos a licensed clinical psychologist.  Patient was found to be criteria for adult attention deficit hyperactivity disorder, combined.  Symptoms included difficulties with taking college courses, her mind  wandering, feeling overwhelmed and maintaining her interest.  On the adult ADHD self-report scale symptom checklist, patient indicated chronic difficulties with attention to details, organizing tasks, avoiding tasks requiring a lot of thoughts and fidgeting and squirming.  Her symptoms highly consistent with ADHD in adults.  Also acknowledged the majority of the listed symptoms of ADHD in the DSM-V.    Psychiatric Review of Systems: Denies symptoms of psychosis, fredrick or PTSD      PHQ-9 Depression Screening  PHQ-9 Total Score:      Little interest or pleasure in doing things?     Feeling down, depressed, or hopeless?     Trouble falling or staying asleep, or sleeping too much?     Feeling tired or having little energy?     Poor appetite or overeating?     Feeling bad about yourself - or that you are a failure or have let yourself or your family down?     Trouble concentrating on things, such as reading the newspaper or watching television?     Moving or speaking so slowly that other people could have noticed? Or the opposite - being so fidgety or restless that you have been moving around a lot more than usual?     Thoughts that you would be better off dead, or of hurting yourself in some way?     PHQ-9 Total Score       ALEXA-7  Feeling nervous, anxious or on edge: Not at all  Not being able to stop or control worrying: Not at all  Worrying too much about different things: Not at all  Trouble Relaxing: Not at all  Being so restless that it is hard to sit still: Not at all  Feeling afraid as if something awful might happen: Not at all  Becoming easily annoyed or irritable: Not at all  ALEXA 7 Total Score: 0  If you checked any problems, how difficult have these problems made it for you to do your work, take care of things at home, or get along with other people: Not difficult at all    Past Surgical History:  Past Surgical History:   Procedure Laterality Date   •  SECTION     • CLUB FOOT RELEASE     • TUBAL  ABDOMINAL LIGATION         Problem List:  Patient Active Problem List   Diagnosis   • Anxiety   • Deafness   • Depression   • Head injury   • Primary localized osteoarthrosis, ankle and foot       Allergy:   Allergies   Allergen Reactions   • Codeine Itching   • Flexeril [Cyclobenzaprine] Nausea And Vomiting   • Sulfa Antibiotics Hives        Discontinued Medications:  Medications Discontinued During This Encounter   Medication Reason   • fluconazole (DIFLUCAN) 150 MG tablet *Therapy completed   • amphetamine-dextroamphetamine (ADDERALL) 10 MG tablet Historical Med - Therapy completed       Current Medications:   Current Outpatient Medications   Medication Sig Dispense Refill   • ibuprofen (ADVIL,MOTRIN) 800 MG tablet Take 1 tablet by mouth Every 6 (Six) Hours As Needed for Moderate Pain. (Patient taking differently: Take 400 mg by mouth Every 6 (Six) Hours As Needed for Moderate Pain.) 90 tablet 0   • metFORMIN ER (GLUCOPHAGE-XR) 500 MG 24 hr tablet Take 2 tablets by mouth Daily With Breakfast. 180 tablet 1   • vitamin D (ERGOCALCIFEROL) 1.25 MG (71449 UT) capsule capsule Take 1 capsule by mouth Every 7 (Seven) Days. 13 capsule 1   • lisdexamfetamine (Vyvanse) 30 MG capsule Take 1 capsule by mouth Every Morning for 30 days 30 capsule 0     No current facility-administered medications for this visit.       Past Medical History:  Past Medical History:   Diagnosis Date   • ADHD (attention deficit hyperactivity disorder)    • Anxiety    • Depression    • Diabetes mellitus (HCC) May 2022       Past Psychiatric History:  Medication Trials: Zoloft, wellbutrin      Substance Abuse History:   Types: Denies    Access to Firearms: Denies    Social History     Socioeconomic History   • Marital status:    Tobacco Use   • Smoking status: Every Day     Packs/day: 0.50     Years: 10.00     Pack years: 5.00     Types: Cigarettes   • Smokeless tobacco: Never   Vaping Use   • Vaping Use: Never used   Substance and Sexual  "Activity   • Alcohol use: Never   • Drug use: Never   • Sexual activity: Defer         Family History   Problem Relation Age of Onset   • Breast cancer Mother    • Cervical cancer Mother    • COPD Mother    • Lung cancer Mother    • Colon polyps Mother    • Diabetes Paternal Grandmother            Mental Status Exam:     Appearance: good eye contact, normal street clothes, groomed, sitting in chair   Behavior: pleasant and cooperative  Motor: no abnormal  Speech: normal rhythm, rate, volume, tone, not hyperverbal, not pressured, normal prosidy  Mood: \"Okay\"  Affect: euthymic  Thought Content: negative suicidal ideations, negative homicidal ideations, negative obsessions  Perceptions: negative auditory hallucinations, negative visual hallucinations, negative delusions, negative paranoia  Thought Process: goal directed, linear  Insight/Judgement: fair/fair  Cognition: grossly intact  Attention: intact  Orientation: person, place, time and situation  Memory: intact    Review of Systems:     Constitutional: Denies fatigue, night sweats  Eyes: Denies double vision, blurred vision  HENT: Denies vertigo, recent head injury  Cardiovascular: Denies chest pain, irregular heartbeats  Respiratory: Denies productive cough, shortness of breath  Gastrointestinal: Denies nausea, vomiting  Genitourinary: Denies dysuria, urinary retention  Integument: Denies hair growth change, new skin lesions  Neurologic: Denies altered mental status, seizures  Musculoskeletal: Denies joint swelling, limitation of motion  Endocrine: Denies cold intolerance, heat intolerance  Psychiatric: Admits inattention.  Denies psychosis, fredrick, post-traumatic stress disorder, obsessive compulsive disorder.   Allergic-immunologic: Denies frequent illnesses      Vital Signs:   There were no vitals taken for this visit.     Lab Results:   Admission on 01/20/2023, Discharged on 01/20/2023   Component Date Value Ref Range Status   • Rapid Strep A Screen 01/20/2023 " Negative  Negative, VALID, INVALID, Not Performed Final   • Internal Control 01/20/2023 Passed  Passed Final   • Lot Number 01/20/2023 [id number]   Final   • Expiration Date 01/20/2023 5/31/24   Final   • SARS Antigen 01/20/2023 Not Detected  Not Detected, Presumptive Negative Final   • Internal Control 01/20/2023 Passed  Passed Final   • Lot Number 01/20/2023 [id number]   Final   • Expiration Date 01/20/2023 7/8/23   Final   • COVID19 01/20/2023 Not Detected  Not Detected - Ref. Range Final   Lab on 11/01/2022   Component Date Value Ref Range Status   • Hemoglobin A1C 11/01/2022 6.20 (H)  4.80 - 5.60 % Final   • Amphet/Methamphet, Screen 11/01/2022 Positive (A)  Negative Final   • Barbiturates Screen, Urine 11/01/2022 Negative  Negative Final   • Benzodiazepine Screen, Urine 11/01/2022 Negative  Negative Final   • Cocaine Screen, Urine 11/01/2022 Negative  Negative Final   • Opiate Screen 11/01/2022 Negative  Negative Final   • THC, Screen, Urine 11/01/2022 Negative  Negative Final   • Methadone Screen, Urine 11/01/2022 Negative  Negative Final   • Oxycodone Screen, Urine 11/01/2022 Negative  Negative Final   • Insulin 11/01/2022 90.0 (H)  2.6 - 24.9 uIU/mL Final   • Glucose 11/01/2022 114 (H)  65 - 99 mg/dL Final   • BUN 11/01/2022 13  8 - 23 mg/dL Final   • Creatinine 11/01/2022 0.78  0.57 - 1.00 mg/dL Final   • Sodium 11/01/2022 142  136 - 145 mmol/L Final   • Potassium 11/01/2022 3.8  3.5 - 5.2 mmol/L Final   • Chloride 11/01/2022 103  98 - 107 mmol/L Final   • CO2 11/01/2022 26.7  22.0 - 29.0 mmol/L Final   • Calcium 11/01/2022 9.4  8.6 - 10.5 mg/dL Final   • Total Protein 11/01/2022 7.2  6.0 - 8.5 g/dL Final   • Albumin 11/01/2022 4.00  3.50 - 5.20 g/dL Final   • ALT (SGPT) 11/01/2022 51 (H)  1 - 33 U/L Final   • AST (SGOT) 11/01/2022 49 (H)  1 - 32 U/L Final   • Alkaline Phosphatase 11/01/2022 90  39 - 117 U/L Final   • Total Bilirubin 11/01/2022 0.2  0.0 - 1.2 mg/dL Final   • Globulin 11/01/2022 3.2  gm/dL Final    • A/G Ratio 11/01/2022 1.3  g/dL Final   • BUN/Creatinine Ratio 11/01/2022 16.7  7.0 - 25.0 Final   • Anion Gap 11/01/2022 12.3  5.0 - 15.0 mmol/L Final   • eGFR 11/01/2022 86.5  >60.0 mL/min/1.73 Final    National Kidney Foundation and American Society of Nephrology (ASN) Task Force recommended calculation based on the Chronic Kidney Disease Epidemiology Collaboration (CKD-EPI) equation refit without adjustment for race.   • WBC 11/01/2022 8.29  3.40 - 10.80 10*3/mm3 Final   • RBC 11/01/2022 4.49  3.77 - 5.28 10*6/mm3 Final   • Hemoglobin 11/01/2022 14.3  12.0 - 15.9 g/dL Final   • Hematocrit 11/01/2022 41.9  34.0 - 46.6 % Final   • MCV 11/01/2022 93.3  79.0 - 97.0 fL Final   • MCH 11/01/2022 31.8  26.6 - 33.0 pg Final   • MCHC 11/01/2022 34.1  31.5 - 35.7 g/dL Final   • RDW 11/01/2022 12.0 (L)  12.3 - 15.4 % Final   • RDW-SD 11/01/2022 41.3  37.0 - 54.0 fl Final   • MPV 11/01/2022 10.4  6.0 - 12.0 fL Final   • Platelets 11/01/2022 286  140 - 450 10*3/mm3 Final   • Neutrophil % 11/01/2022 48.1  42.7 - 76.0 % Final   • Lymphocyte % 11/01/2022 35.8  19.6 - 45.3 % Final   • Monocyte % 11/01/2022 9.8  5.0 - 12.0 % Final   • Eosinophil % 11/01/2022 4.9  0.3 - 6.2 % Final   • Basophil % 11/01/2022 1.2  0.0 - 1.5 % Final   • Immature Grans % 11/01/2022 0.2  0.0 - 0.5 % Final   • Neutrophils, Absolute 11/01/2022 3.98  1.70 - 7.00 10*3/mm3 Final   • Lymphocytes, Absolute 11/01/2022 2.97  0.70 - 3.10 10*3/mm3 Final   • Monocytes, Absolute 11/01/2022 0.81  0.10 - 0.90 10*3/mm3 Final   • Eosinophils, Absolute 11/01/2022 0.41 (H)  0.00 - 0.40 10*3/mm3 Final   • Basophils, Absolute 11/01/2022 0.10  0.00 - 0.20 10*3/mm3 Final   • Immature Grans, Absolute 11/01/2022 0.02  0.00 - 0.05 10*3/mm3 Final   • nRBC 11/01/2022 0.0  0.0 - 0.2 /100 WBC Final   Lab Requisition on 10/06/2022   Component Date Value Ref Range Status   • QuantiFERON Incubation 10/06/2022 Incubation performed.   Final   • QUANTIFERON-TB GOLD PLUS 10/06/2022  Negative  Negative Final    No response to M tuberculosis antigens detected.  Infection with M tuberculosis is unlikely, but high risk  individuals should be considered for additional testing  (ATS/IDSA/CDC Clinical Practice Guidelines, 2017). The  reference range is an Antigen minus Nil result of <0.35 IU/mL.  Chemiluminescence immunoassay methodology   • QuantiFERON Criteria 10/06/2022 Comment   Final    QuantiFERON-TB Gold Plus is a qualitative indirect test for  M tuberculosis infection (including disease) and is intended for use  in conjunction with risk assessment, radiography, and other medical  and diagnostic evaluations. The QuantiFERON-TB Gold Plus result is  determined by subtracting the Nil value from either TB antigen (Ag)  value. The Mitogen tube serves as a control for the test.   • QUANTIFERON TB1 AG VALUE 10/06/2022 0.24  IU/mL Final   • QUANTIFERON TB2 AG VALUE 10/06/2022 0.18  IU/mL Final   • QuantiFERON Nil Value 10/06/2022 0.09  IU/mL Final   • QuantiFERON Mitogen Value 10/06/2022 >10.00  IU/mL Final   Admission on 06/28/2022, Discharged on 06/28/2022   Component Date Value Ref Range Status   • Color 06/28/2022 Yellow  Yellow, Straw, Dark Yellow, Donna Final   • Clarity, UA 06/28/2022 Clear  Clear Final   • Glucose, UA 06/28/2022 Negative  Negative mg/dL Final   • Bilirubin 06/28/2022 Negative  Negative Final   • Ketones, UA 06/28/2022 Negative  Negative Final   • Specific Gravity  06/28/2022 1.025  1.005 - 1.030 Final   • Blood, UA 06/28/2022 Moderate (A)  Negative Final   • pH, Urine 06/28/2022 6.0  5.0 - 8.0 Final   • Protein, POC 06/28/2022 2+ (A)  Negative mg/dL Final   • Urobilinogen, UA 06/28/2022 4 E.U./dL (A)  Normal Final   • Nitrite, UA 06/28/2022 Negative  Negative Final   • Leukocytes 06/28/2022 Negative  Negative Final   • Rapid Strep A Screen 06/28/2022 Negative  Negative, VALID, INVALID, Not Performed Final   • Internal Control 06/28/2022 Passed  Passed Final   • Lot Number  06/28/2022 [id number]   Final   • Expiration Date 06/28/2022 113,023   Final   • Rapid Influenza A Ag 06/28/2022 Negative  Negative Final   • Rapid Influenza B Ag 06/28/2022 Negative  Negative Final   • Internal Control 06/28/2022 Passed  Passed Final   • Lot Number 06/28/2022 [id number]   Final   • Expiration Date 06/28/2022 8,092,023   Final   • SARS Antigen 06/28/2022 Detected (A)  Not Detected, Presumptive Negative Final   • Internal Control 06/28/2022 Passed  Passed Final   • Lot Number 06/28/2022 [id number]   Final   • Expiration Date 06/28/2022 3,292,023   Final   Lab on 06/01/2022   Component Date Value Ref Range Status   • Glucose 06/01/2022 99  65 - 99 mg/dL Final   • BUN 06/01/2022 16  8 - 23 mg/dL Final   • Creatinine 06/01/2022 0.69  0.57 - 1.00 mg/dL Final   • Sodium 06/01/2022 144  136 - 145 mmol/L Final   • Potassium 06/01/2022 3.9  3.5 - 5.2 mmol/L Final   • Chloride 06/01/2022 109 (H)  98 - 107 mmol/L Final   • CO2 06/01/2022 23.3  22.0 - 29.0 mmol/L Final   • Calcium 06/01/2022 9.4  8.6 - 10.5 mg/dL Final   • Total Protein 06/01/2022 7.1  6.0 - 8.5 g/dL Final   • Albumin 06/01/2022 4.00  3.50 - 5.20 g/dL Final   • ALT (SGPT) 06/01/2022 52 (H)  1 - 33 U/L Final   • AST (SGOT) 06/01/2022 46 (H)  1 - 32 U/L Final   • Alkaline Phosphatase 06/01/2022 105  39 - 117 U/L Final   • Total Bilirubin 06/01/2022 0.2  0.0 - 1.2 mg/dL Final   • Globulin 06/01/2022 3.1  gm/dL Final   • A/G Ratio 06/01/2022 1.3  g/dL Final   • BUN/Creatinine Ratio 06/01/2022 23.2  7.0 - 25.0 Final   • Anion Gap 06/01/2022 11.7  5.0 - 15.0 mmol/L Final   • eGFR 06/01/2022 99.5  >60.0 mL/min/1.73 Final    National Kidney Foundation and American Society of Nephrology (ASN) Task Force recommended calculation based on the Chronic Kidney Disease Epidemiology Collaboration (CKD-EPI) equation refit without adjustment for race.   • Hemoglobin A1C 06/01/2022 6.50 (H)  4.80 - 5.60 % Final   • Total Cholesterol 06/01/2022 184  0 - 200 mg/dL Final    • Triglycerides 06/01/2022 274 (H)  0 - 150 mg/dL Final   • HDL Cholesterol 06/01/2022 38 (L)  40 - 60 mg/dL Final   • LDL Cholesterol  06/01/2022 99  0 - 100 mg/dL Final   • VLDL Cholesterol 06/01/2022 47 (H)  5 - 40 mg/dL Final   • LDL/HDL Ratio 06/01/2022 2.40   Final   • TSH 06/01/2022 2.890  0.270 - 4.200 uIU/mL Final   • 25 Hydroxy, Vitamin D 06/01/2022 21.0 (L)  30.0 - 100.0 ng/ml Final   • Vitamin B-12 06/01/2022 547  211 - 946 pg/mL Final   • Iron 06/01/2022 74  37 - 145 mcg/dL Final   • Iron Saturation 06/01/2022 21  20 - 50 % Final   • Transferrin 06/01/2022 237  200 - 360 mg/dL Final   • TIBC 06/01/2022 353  298 - 536 mcg/dL Final   • Insulin 06/01/2022 96.0 (H)  2.6 - 24.9 uIU/mL Final   • WBC 06/01/2022 8.70  3.40 - 10.80 10*3/mm3 Final   • RBC 06/01/2022 4.63  3.77 - 5.28 10*6/mm3 Final   • Hemoglobin 06/01/2022 14.6  12.0 - 15.9 g/dL Final   • Hematocrit 06/01/2022 43.7  34.0 - 46.6 % Final   • MCV 06/01/2022 94.4  79.0 - 97.0 fL Final   • MCH 06/01/2022 31.5  26.6 - 33.0 pg Final   • MCHC 06/01/2022 33.4  31.5 - 35.7 g/dL Final   • RDW 06/01/2022 12.1 (L)  12.3 - 15.4 % Final   • RDW-SD 06/01/2022 41.6  37.0 - 54.0 fl Final   • MPV 06/01/2022 10.7  6.0 - 12.0 fL Final   • Platelets 06/01/2022 270  140 - 450 10*3/mm3 Final   • Neutrophil % 06/01/2022 51.8  42.7 - 76.0 % Final   • Lymphocyte % 06/01/2022 33.9  19.6 - 45.3 % Final   • Monocyte % 06/01/2022 7.8  5.0 - 12.0 % Final   • Eosinophil % 06/01/2022 5.1  0.3 - 6.2 % Final   • Basophil % 06/01/2022 0.9  0.0 - 1.5 % Final   • Immature Grans % 06/01/2022 0.5  0.0 - 0.5 % Final   • Neutrophils, Absolute 06/01/2022 4.51  1.70 - 7.00 10*3/mm3 Final   • Lymphocytes, Absolute 06/01/2022 2.95  0.70 - 3.10 10*3/mm3 Final   • Monocytes, Absolute 06/01/2022 0.68  0.10 - 0.90 10*3/mm3 Final   • Eosinophils, Absolute 06/01/2022 0.44 (H)  0.00 - 0.40 10*3/mm3 Final   • Basophils, Absolute 06/01/2022 0.08  0.00 - 0.20 10*3/mm3 Final   • Immature Grans,  Absolute 06/01/2022 0.04  0.00 - 0.05 10*3/mm3 Final   • nRBC 06/01/2022 0.1  0.0 - 0.2 /100 WBC Final   Office Visit on 02/10/2022   Component Date Value Ref Range Status   • Rapid Strep A Screen 02/10/2022 Negative  Negative, VALID, INVALID, Not Performed Final   • Internal Control 02/10/2022 Passed  Passed Final   • Lot Number 02/10/2022 [id number]   Final   • Expiration Date 02/10/2022 7/31/2023   Final       EKG Results:  No orders to display       Imaging Results:  No Images in the past 120 days found..      Assessment & Plan   Diagnoses and all orders for this visit:    1. ADHD (attention deficit hyperactivity disorder), inattentive type (Primary)  -     lisdexamfetamine (Vyvanse) 30 MG capsule; Take 1 capsule by mouth Every Morning for 30 days  Dispense: 30 capsule; Refill: 0      Patient would like to go back on vyvanse, as she feels the medication helped more for ADHD previously. Will stop adderall. Start vyvanse to target ADHD symptoms. 16 minutes of supportive psychotherapy with goal to strengthen defenses, promote problems solving, restore adaptive functioning and provide symptom relief. The therapeutic alliance was strengthened to encourage the patient to express their thoughts and feelings. Esteem building was enhanced through praise, reassurance, normalizing and encouragement. Coping skills were enhanced to build distress tolerance skills and emotional regulation. Allowed patient to freely discuss issues without interruption or judgement with unconditional positive regard, active listening skills, and empathy. Provided a safe, confidential environment to facilitate the development of a positive therapeutic relationship and encourage open, honest communication. Assisted patient in identifying risk factors which would indicate the need for higher level of care including thoughts to harm self or others and/or self-harming behavior and encouraged patient to contact this office, call 911, or present to the  nearest emergency room should any of these events occur. Assisted patient in processing session content; acknowledged and normalized patient's thoughts, feelings, and concerns by utilizing a person-centered approach in efforts to build appropriate rapport and a positive therapeutic relationship with open and honest communication. Patient given education on medication side effects, diagnosis/illness and relapse symptoms. Plan to continue supportive psychotherapy in next appointment to provide symptom relief. 12 weeks    Diagnoses: as above  Symptoms: as above  Functional status: good  Mental Status Exam: as above     Treatment plan: medication management and supportive psychotherapy  Prognosis: good  Progress: continued improvement    Visit Diagnoses:    ICD-10-CM ICD-9-CM   1. ADHD (attention deficit hyperactivity disorder), inattentive type  F90.0 314.00       PLAN:  1. Safety: No acute safety concerns.   2. Referral: None at this time  3. Risk Assessment: Risk of self-harm acutely is moderate.  Risk factors include anxiety disorder, mood disorder, and recent psychosocial stressors (pandemic). Protective factors include no family history, denies access to guns/weapons, no present SI, no history of suicide attempts or self-harm in the past, minimal AODA, healthcare seeking, future orientation, willingness to engage in care.  Risk of self-harm chronically is also moderate, but could be further elevated in the event of treatment noncompliance and/or AODA.  4. Medications: Stop adderall. Start vyvanse 30mg po qday to target ADHD. Risks, benefits, side effects discussed with patient including elevated heart rate, elevated blood pressure, irritability, insomnia, sexual dysfunction, appetite suppressing properties, psychosis.  After discussion of these risks and benefits, the patient voiced understanding and agreed to proceed. Irvin reviewed, UDS ordered, and controlled substance agreement signed &  witnessed.  5. Labs/studies: UDS November 2022 positive for amphetamines (rx)  6. Follow-up: 3 month    Patient screened positive for depression based on a PHQ-9 score of  on . Follow-up recommendations include: Suicide Risk Assessment performed.         TREATMENT PLAN/GOALS: Continue supportive psychotherapy efforts and medications as indicated. Treatment and medication options discussed during today's visit. Patient ackowledged and verbally consented to continue with current treatment plan and was educated on the importance of compliance with treatment and follow-up appointments.    MEDICATION ISSUES:  JEOVANY reviewed as expected.  Discussed medication options and treatment plan of prescribed medication as well as the risks, benefits, and side effects including potential falls, possible impaired driving and metabolic adversities among others. Patient is agreeable to call the office with any worsening of symptoms or onset of side effects. Patient is agreeable to call 911 or go to the nearest ER should he/she begin having SI/HI. No medication side effects or related complaints today.     MEDS ORDERED DURING VISIT:  New Medications Ordered This Visit   Medications   • lisdexamfetamine (Vyvanse) 30 MG capsule     Sig: Take 1 capsule by mouth Every Morning for 30 days     Dispense:  30 capsule     Refill:  0       Return in about 12 weeks (around 4/28/2023) for Next scheduled follow up.         This document has been electronically signed by YENNIFER Loyd  February 3, 2023 16:14 EST      Part of this note may be an electronic transcription/translation of spoken language to printed text using the Dragon Dictation System.

## 2023-02-06 DIAGNOSIS — E55.9 VITAMIN D DEFICIENCY: ICD-10-CM

## 2023-02-06 RX ORDER — ERGOCALCIFEROL 1.25 MG/1
50000 CAPSULE ORAL
Qty: 13 CAPSULE | Refills: 1 | Status: SHIPPED | OUTPATIENT
Start: 2023-02-06

## 2023-02-07 ENCOUNTER — TELEPHONE (OUTPATIENT)
Dept: PSYCHIATRY | Facility: CLINIC | Age: 63
End: 2023-02-07
Payer: COMMERCIAL

## 2023-02-07 ENCOUNTER — PRIOR AUTHORIZATION (OUTPATIENT)
Dept: BEHAVIORAL HEALTH | Facility: CLINIC | Age: 63
End: 2023-02-07
Payer: COMMERCIAL

## 2023-02-07 NOTE — TELEPHONE ENCOUNTER
SENT PA for Vyvanse 30mg to [facility] awaiting decision from insurance.  Called patient and updated her on status.

## 2023-02-07 NOTE — TELEPHONE ENCOUNTER
Ami [place]: [id number] - PA Case ID: [id number]  PA submitted to [facility] awaiting decision from insurance company

## 2023-02-07 NOTE — TELEPHONE ENCOUNTER
Pt left voicemail to report Vyvanse requires prior authorization and would like status update on this if possible.

## 2023-02-09 NOTE — TELEPHONE ENCOUNTER
Approvedon February 8  PA Case: [id number], Status: Approved, Coverage Starts on: 2/7/2023 12:00 AM, Coverage Ends on: 2/7/2024 12:00 AM. Questions? Contact [phone].    VYVANSE 30OF

## 2023-02-09 NOTE — TELEPHONE ENCOUNTER
Pt called and asked if her Vyvanse was any closer to being approved.  I cannot see the request from my Covermymeds cue.  Routing to pool

## 2023-02-15 NOTE — TELEPHONE ENCOUNTER
Prior authorization for VYVANSE 30MG processed and APPROVED from 02/07/2023 to 02/07/2024.     BEHAVIORAL HEALTH - SCAN - VYVANSE PA APPROVAL, [facility], 02/08/2023 (02/08/2023)    Called pt to notify of approval. Pt did not answer, LVM to notify of approval.

## 2023-03-20 DIAGNOSIS — F90.0 ADHD (ATTENTION DEFICIT HYPERACTIVITY DISORDER), INATTENTIVE TYPE: ICD-10-CM

## 2023-03-22 NOTE — TELEPHONE ENCOUNTER
Called pt to schedule new f/u appt in order to authorize medication refills.     Pt scheduled new f/u appt  Appointment with Quincy Yap APRN (04/28/2023)    Medication order pended for approval.

## 2023-04-28 ENCOUNTER — TELEMEDICINE (OUTPATIENT)
Dept: PSYCHIATRY | Facility: CLINIC | Age: 63
End: 2023-04-28
Payer: COMMERCIAL

## 2023-04-28 DIAGNOSIS — F90.0 ADHD (ATTENTION DEFICIT HYPERACTIVITY DISORDER), INATTENTIVE TYPE: Primary | ICD-10-CM

## 2023-04-28 NOTE — PROGRESS NOTES
Subjective   Ami Lewis is a 62 y.o. female who presents today for follow up.   Mode of visit: Video  Location of provider: [address] [place], [place] [address], [address], [place] [address].  Location of patient: Home  Does the patient consent to use a video/audio connection for your medical care today? Yes  The visit included audio and video interaction. No technical issues occurred during this visit.  This provider is located at [address] [facility], [address] in [place], [place].    Chief Complaint:  ADHD    History of Present Illness:     ADHD:   Inattention:   Often fails to give close attention to details or makes careless mistakes in schoolwork, at work, or during other activities- n  Often has difficulty sustaining attention in tasks- n  Often does not seem to listen when spoken to directly- n  Often does not follow through on instructions and fails to finish duties in the workplace- n  Often has difficulty organizing tasks and activities- n  Often avoids, dislikes or is reluctant to engage in tasks that require sustained mental effort- n  Often loses things necessary for tasks or activities- n  Is often easily distracted by extraneous stimuli- n  Is often forgetful in daily activities- n  Hyperactivity and Impulsivity:   Often fidgets with or taps hands or feet-n  Often leaves seat in situations when remaining seated is expected- n  Often feels restless- n  Is often “on the go”, acting as if “driven by a motor”- n  Often talks excessively- n  Often blurts out an answer before a question has been completed- n  Often has difficulty waiting their turn- n  Often interrupts or intrudes on others- n    11/17/21 Patient had evaluation for ADHD on November 4, 2021 by Phill Bustos a licensed clinical psychologist.  Patient was found to be criteria for adult attention deficit hyperactivity disorder, combined.  Symptoms included difficulties with taking college courses, her mind wandering, feeling overwhelmed and  maintaining her interest.  On the adult ADHD self-report scale symptom checklist, patient indicated chronic difficulties with attention to details, organizing tasks, avoiding tasks requiring a lot of thoughts and fidgeting and squirming.  Her symptoms highly consistent with ADHD in adults.  Also acknowledged the majority of the listed symptoms of ADHD in the DSM-V.    Psychiatric Review of Systems: Denies symptoms of psychosis, fredrick or PTSD      PHQ-9 Depression Screening  PHQ-9 Total Score:      Little interest or pleasure in doing things?     Feeling down, depressed, or hopeless?     Trouble falling or staying asleep, or sleeping too much?     Feeling tired or having little energy?     Poor appetite or overeating?     Feeling bad about yourself - or that you are a failure or have let yourself or your family down?     Trouble concentrating on things, such as reading the newspaper or watching television?     Moving or speaking so slowly that other people could have noticed? Or the opposite - being so fidgety or restless that you have been moving around a lot more than usual?     Thoughts that you would be better off dead, or of hurting yourself in some way?     PHQ-9 Total Score       ALEXA-7       Past Surgical History:  Past Surgical History:   Procedure Laterality Date   •  SECTION     • CLUB FOOT RELEASE     • TUBAL ABDOMINAL LIGATION         Problem List:  Patient Active Problem List   Diagnosis   • Anxiety   • Deafness   • Depression   • Head injury   • Primary localized osteoarthrosis, ankle and foot       Allergy:   Allergies   Allergen Reactions   • Codeine Itching   • Flexeril [Cyclobenzaprine] Nausea And Vomiting   • Sulfa Antibiotics Hives        Discontinued Medications:  Medications Discontinued During This Encounter   Medication Reason   • lisdexamfetamine (Vyvanse) 30 MG capsule Reorder       Current Medications:   Current Outpatient Medications   Medication Sig Dispense Refill   •  "lisdexamfetamine (Vyvanse) 40 MG capsule Take 1 capsule by mouth Every Morning for 30 days 30 capsule 0   • ibuprofen (ADVIL,MOTRIN) 800 MG tablet Take 1 tablet by mouth Every 6 (Six) Hours As Needed for Moderate Pain. (Patient taking differently: Take 400 mg by mouth Every 6 (Six) Hours As Needed for Moderate Pain.) 90 tablet 0   • metFORMIN ER (GLUCOPHAGE-XR) 500 MG 24 hr tablet Take 2 tablets by mouth Daily With Breakfast. 180 tablet 1   • vitamin D (ERGOCALCIFEROL) 1.25 MG (36235 UT) capsule capsule Take 1 capsule by mouth Every 7 (Seven) Days. 13 capsule 1     No current facility-administered medications for this visit.       Past Medical History:  Past Medical History:   Diagnosis Date   • ADHD (attention deficit hyperactivity disorder)    • Anxiety    • Depression    • Diabetes mellitus May 2022       Past Psychiatric History:  Medication Trials: Zoloft, wellbutrin      Substance Abuse History:   Types: Denies    Access to Firearms: Denies    Social History     Socioeconomic History   • Marital status:    Tobacco Use   • Smoking status: Every Day     Packs/day: 0.50     Years: 10.00     Pack years: 5.00     Types: Cigarettes   • Smokeless tobacco: Never   Vaping Use   • Vaping Use: Never used   Substance and Sexual Activity   • Alcohol use: Never   • Drug use: Never   • Sexual activity: Defer         Family History   Problem Relation Age of Onset   • Breast cancer Mother    • Cervical cancer Mother    • COPD Mother    • Lung cancer Mother    • Colon polyps Mother    • Diabetes Paternal Grandmother            Mental Status Exam:     Appearance: good eye contact, normal street clothes, groomed, sitting in chair   Behavior: pleasant and cooperative  Motor: no abnormal  Speech: normal rhythm, rate, volume, tone, not hyperverbal, not pressured, normal prosidy  Mood: \"Okay\"  Affect: euthymic  Thought Content: negative suicidal ideations, negative homicidal ideations, negative obsessions  Perceptions: " negative auditory hallucinations, negative visual hallucinations, negative delusions, negative paranoia  Thought Process: goal directed, linear  Insight/Judgement: fair/fair  Cognition: grossly intact  Attention: intact  Orientation: person, place, time and situation  Memory: intact    Review of Systems:     Constitutional: Denies fatigue, night sweats  Eyes: Denies double vision, blurred vision  HENT: Denies vertigo, recent head injury  Cardiovascular: Denies chest pain, irregular heartbeats  Respiratory: Denies productive cough, shortness of breath  Gastrointestinal: Denies nausea, vomiting  Genitourinary: Denies dysuria, urinary retention  Integument: Denies hair growth change, new skin lesions  Neurologic: Denies altered mental status, seizures  Musculoskeletal: Denies joint swelling, limitation of motion  Endocrine: Denies cold intolerance, heat intolerance  Psychiatric: Admits anxiety, depression.  Denies psychosis, fredrick, post-traumatic stress disorder, obsessive compulsive disorder.   Allergic-immunologic: Denies frequent illnesses      Vital Signs:   There were no vitals taken for this visit.     Lab Results:   Admission on 03/01/2023, Discharged on 03/01/2023   Component Date Value Ref Range Status   • SARS Antigen 03/01/2023 Detected (A)  Not Detected, Presumptive Negative Final   • Internal Control 03/01/2023 Passed  Passed Final   • Lot Number 03/01/2023 [id number]   Final   • Expiration Date 03/01/2023 10/02/2023   Final   Admission on 01/20/2023, Discharged on 01/20/2023   Component Date Value Ref Range Status   • Rapid Strep A Screen 01/20/2023 Negative  Negative, VALID, INVALID, Not Performed Final   • Internal Control 01/20/2023 Passed  Passed Final   • Lot Number 01/20/2023 [id number]   Final   • Expiration Date 01/20/2023 5/31/24   Final   • SARS Antigen 01/20/2023 Not Detected  Not Detected, Presumptive Negative Final   • Internal Control 01/20/2023 Passed  Passed Final   • Lot Number 01/20/2023  [id number]   Final   • Expiration Date 01/20/2023 7/8/23   Final   • COVID19 01/20/2023 Not Detected  Not Detected - Ref. Range Final   Lab on 11/01/2022   Component Date Value Ref Range Status   • Hemoglobin A1C 11/01/2022 6.20 (H)  4.80 - 5.60 % Final   • Amphet/Methamphet, Screen 11/01/2022 Positive (A)  Negative Final   • Barbiturates Screen, Urine 11/01/2022 Negative  Negative Final   • Benzodiazepine Screen, Urine 11/01/2022 Negative  Negative Final   • Cocaine Screen, Urine 11/01/2022 Negative  Negative Final   • Opiate Screen 11/01/2022 Negative  Negative Final   • THC, Screen, Urine 11/01/2022 Negative  Negative Final   • Methadone Screen, Urine 11/01/2022 Negative  Negative Final   • Oxycodone Screen, Urine 11/01/2022 Negative  Negative Final   • Insulin 11/01/2022 90.0 (H)  2.6 - 24.9 uIU/mL Final   • Glucose 11/01/2022 114 (H)  65 - 99 mg/dL Final   • BUN 11/01/2022 13  8 - 23 mg/dL Final   • Creatinine 11/01/2022 0.78  0.57 - 1.00 mg/dL Final   • Sodium 11/01/2022 142  136 - 145 mmol/L Final   • Potassium 11/01/2022 3.8  3.5 - 5.2 mmol/L Final   • Chloride 11/01/2022 103  98 - 107 mmol/L Final   • CO2 11/01/2022 26.7  22.0 - 29.0 mmol/L Final   • Calcium 11/01/2022 9.4  8.6 - 10.5 mg/dL Final   • Total Protein 11/01/2022 7.2  6.0 - 8.5 g/dL Final   • Albumin 11/01/2022 4.00  3.50 - 5.20 g/dL Final   • ALT (SGPT) 11/01/2022 51 (H)  1 - 33 U/L Final   • AST (SGOT) 11/01/2022 49 (H)  1 - 32 U/L Final   • Alkaline Phosphatase 11/01/2022 90  39 - 117 U/L Final   • Total Bilirubin 11/01/2022 0.2  0.0 - 1.2 mg/dL Final   • Globulin 11/01/2022 3.2  gm/dL Final   • A/G Ratio 11/01/2022 1.3  g/dL Final   • BUN/Creatinine Ratio 11/01/2022 16.7  7.0 - 25.0 Final   • Anion Gap 11/01/2022 12.3  5.0 - 15.0 mmol/L Final   • eGFR 11/01/2022 86.5  >60.0 mL/min/1.73 Final    National Kidney Foundation and American Society of Nephrology (ASN) Task Force recommended calculation based on the Chronic Kidney Disease Epidemiology  Collaboration (CKD-EPI) equation refit without adjustment for race.   • WBC 11/01/2022 8.29  3.40 - 10.80 10*3/mm3 Final   • RBC 11/01/2022 4.49  3.77 - 5.28 10*6/mm3 Final   • Hemoglobin 11/01/2022 14.3  12.0 - 15.9 g/dL Final   • Hematocrit 11/01/2022 41.9  34.0 - 46.6 % Final   • MCV 11/01/2022 93.3  79.0 - 97.0 fL Final   • MCH 11/01/2022 31.8  26.6 - 33.0 pg Final   • MCHC 11/01/2022 34.1  31.5 - 35.7 g/dL Final   • RDW 11/01/2022 12.0 (L)  12.3 - 15.4 % Final   • RDW-SD 11/01/2022 41.3  37.0 - 54.0 fl Final   • MPV 11/01/2022 10.4  6.0 - 12.0 fL Final   • Platelets 11/01/2022 286  140 - 450 10*3/mm3 Final   • Neutrophil % 11/01/2022 48.1  42.7 - 76.0 % Final   • Lymphocyte % 11/01/2022 35.8  19.6 - 45.3 % Final   • Monocyte % 11/01/2022 9.8  5.0 - 12.0 % Final   • Eosinophil % 11/01/2022 4.9  0.3 - 6.2 % Final   • Basophil % 11/01/2022 1.2  0.0 - 1.5 % Final   • Immature Grans % 11/01/2022 0.2  0.0 - 0.5 % Final   • Neutrophils, Absolute 11/01/2022 3.98  1.70 - 7.00 10*3/mm3 Final   • Lymphocytes, Absolute 11/01/2022 2.97  0.70 - 3.10 10*3/mm3 Final   • Monocytes, Absolute 11/01/2022 0.81  0.10 - 0.90 10*3/mm3 Final   • Eosinophils, Absolute 11/01/2022 0.41 (H)  0.00 - 0.40 10*3/mm3 Final   • Basophils, Absolute 11/01/2022 0.10  0.00 - 0.20 10*3/mm3 Final   • Immature Grans, Absolute 11/01/2022 0.02  0.00 - 0.05 10*3/mm3 Final   • nRBC 11/01/2022 0.0  0.0 - 0.2 /100 WBC Final   Lab Requisition on 10/06/2022   Component Date Value Ref Range Status   • QuantiFERON Incubation 10/06/2022 Incubation performed.   Final   • QUANTIFERON-TB GOLD PLUS 10/06/2022 Negative  Negative Final    No response to M tuberculosis antigens detected.  Infection with M tuberculosis is unlikely, but high risk  individuals should be considered for additional testing  (ATS/IDSA/CDC Clinical Practice Guidelines, 2017). The  reference range is an Antigen minus Nil result of <0.35 IU/mL.  Chemiluminescence immunoassay methodology   •  QuantiFERON Criteria 10/06/2022 Comment   Final    QuantiFERON-TB Gold Plus is a qualitative indirect test for  M tuberculosis infection (including disease) and is intended for use  in conjunction with risk assessment, radiography, and other medical  and diagnostic evaluations. The QuantiFERON-TB Gold Plus result is  determined by subtracting the Nil value from either TB antigen (Ag)  value. The Mitogen tube serves as a control for the test.   • QUANTIFERON TB1 AG VALUE 10/06/2022 0.24  IU/mL Final   • QUANTIFERON TB2 AG VALUE 10/06/2022 0.18  IU/mL Final   • QuantiFERON Nil Value 10/06/2022 0.09  IU/mL Final   • QuantiFERON Mitogen Value 10/06/2022 >10.00  IU/mL Final   Admission on 06/28/2022, Discharged on 06/28/2022   Component Date Value Ref Range Status   • Color 06/28/2022 Yellow  Yellow, Straw, Dark Yellow, Donna Final   • Clarity, UA 06/28/2022 Clear  Clear Final   • Glucose, UA 06/28/2022 Negative  Negative mg/dL Final   • Bilirubin 06/28/2022 Negative  Negative Final   • Ketones, UA 06/28/2022 Negative  Negative Final   • Specific Gravity  06/28/2022 1.025  1.005 - 1.030 Final   • Blood, UA 06/28/2022 Moderate (A)  Negative Final   • pH, Urine 06/28/2022 6.0  5.0 - 8.0 Final   • Protein, POC 06/28/2022 2+ (A)  Negative mg/dL Final   • Urobilinogen, UA 06/28/2022 4 E.U./dL (A)  Normal Final   • Nitrite, UA 06/28/2022 Negative  Negative Final   • Leukocytes 06/28/2022 Negative  Negative Final   • Rapid Strep A Screen 06/28/2022 Negative  Negative, VALID, INVALID, Not Performed Final   • Internal Control 06/28/2022 Passed  Passed Final   • Lot Number 06/28/2022 [id number]   Final   • Expiration Date 06/28/2022 113,023   Final   • Rapid Influenza A Ag 06/28/2022 Negative  Negative Final   • Rapid Influenza B Ag 06/28/2022 Negative  Negative Final   • Internal Control 06/28/2022 Passed  Passed Final   • Lot Number 06/28/2022 [id number]   Final   • Expiration Date 06/28/2022 8,812,[id number]   Final   • SARS Antigen  06/28/2022 Detected (A)  Not Detected, Presumptive Negative Final   • Internal Control 06/28/2022 Passed  Passed Final   • Lot Number 06/28/2022 [id number]   Final   • Expiration Date 06/28/2022 3,292,023   Final   Lab on 06/01/2022   Component Date Value Ref Range Status   • Glucose 06/01/2022 99  65 - 99 mg/dL Final   • BUN 06/01/2022 16  8 - 23 mg/dL Final   • Creatinine 06/01/2022 0.69  0.57 - 1.00 mg/dL Final   • Sodium 06/01/2022 144  136 - 145 mmol/L Final   • Potassium 06/01/2022 3.9  3.5 - 5.2 mmol/L Final   • Chloride 06/01/2022 109 (H)  98 - 107 mmol/L Final   • CO2 06/01/2022 23.3  22.0 - 29.0 mmol/L Final   • Calcium 06/01/2022 9.4  8.6 - 10.5 mg/dL Final   • Total Protein 06/01/2022 7.1  6.0 - 8.5 g/dL Final   • Albumin 06/01/2022 4.00  3.50 - 5.20 g/dL Final   • ALT (SGPT) 06/01/2022 52 (H)  1 - 33 U/L Final   • AST (SGOT) 06/01/2022 46 (H)  1 - 32 U/L Final   • Alkaline Phosphatase 06/01/2022 105  39 - 117 U/L Final   • Total Bilirubin 06/01/2022 0.2  0.0 - 1.2 mg/dL Final   • Globulin 06/01/2022 3.1  gm/dL Final   • A/G Ratio 06/01/2022 1.3  g/dL Final   • BUN/Creatinine Ratio 06/01/2022 23.2  7.0 - 25.0 Final   • Anion Gap 06/01/2022 11.7  5.0 - 15.0 mmol/L Final   • eGFR 06/01/2022 99.5  >60.0 mL/min/1.73 Final    National Kidney Foundation and American Society of Nephrology (ASN) Task Force recommended calculation based on the Chronic Kidney Disease Epidemiology Collaboration (CKD-EPI) equation refit without adjustment for race.   • Hemoglobin A1C 06/01/2022 6.50 (H)  4.80 - 5.60 % Final   • Total Cholesterol 06/01/2022 184  0 - 200 mg/dL Final   • Triglycerides 06/01/2022 274 (H)  0 - 150 mg/dL Final   • HDL Cholesterol 06/01/2022 38 (L)  40 - 60 mg/dL Final   • LDL Cholesterol  06/01/2022 99  0 - 100 mg/dL Final   • VLDL Cholesterol 06/01/2022 47 (H)  5 - 40 mg/dL Final   • LDL/HDL Ratio 06/01/2022 2.40   Final   • TSH 06/01/2022 2.890  0.270 - 4.200 uIU/mL Final   • 25 Hydroxy, Vitamin D  06/01/2022 21.0 (L)  30.0 - 100.0 ng/ml Final   • Vitamin B-12 06/01/2022 547  211 - 946 pg/mL Final   • Iron 06/01/2022 74  37 - 145 mcg/dL Final   • Iron Saturation 06/01/2022 21  20 - 50 % Final   • Transferrin 06/01/2022 237  200 - 360 mg/dL Final   • TIBC 06/01/2022 353  298 - 536 mcg/dL Final   • Insulin 06/01/2022 96.0 (H)  2.6 - 24.9 uIU/mL Final   • WBC 06/01/2022 8.70  3.40 - 10.80 10*3/mm3 Final   • RBC 06/01/2022 4.63  3.77 - 5.28 10*6/mm3 Final   • Hemoglobin 06/01/2022 14.6  12.0 - 15.9 g/dL Final   • Hematocrit 06/01/2022 43.7  34.0 - 46.6 % Final   • MCV 06/01/2022 94.4  79.0 - 97.0 fL Final   • MCH 06/01/2022 31.5  26.6 - 33.0 pg Final   • MCHC 06/01/2022 33.4  31.5 - 35.7 g/dL Final   • RDW 06/01/2022 12.1 (L)  12.3 - 15.4 % Final   • RDW-SD 06/01/2022 41.6  37.0 - 54.0 fl Final   • MPV 06/01/2022 10.7  6.0 - 12.0 fL Final   • Platelets 06/01/2022 270  140 - 450 10*3/mm3 Final   • Neutrophil % 06/01/2022 51.8  42.7 - 76.0 % Final   • Lymphocyte % 06/01/2022 33.9  19.6 - 45.3 % Final   • Monocyte % 06/01/2022 7.8  5.0 - 12.0 % Final   • Eosinophil % 06/01/2022 5.1  0.3 - 6.2 % Final   • Basophil % 06/01/2022 0.9  0.0 - 1.5 % Final   • Immature Grans % 06/01/2022 0.5  0.0 - 0.5 % Final   • Neutrophils, Absolute 06/01/2022 4.51  1.70 - 7.00 10*3/mm3 Final   • Lymphocytes, Absolute 06/01/2022 2.95  0.70 - 3.10 10*3/mm3 Final   • Monocytes, Absolute 06/01/2022 0.68  0.10 - 0.90 10*3/mm3 Final   • Eosinophils, Absolute 06/01/2022 0.44 (H)  0.00 - 0.40 10*3/mm3 Final   • Basophils, Absolute 06/01/2022 0.08  0.00 - 0.20 10*3/mm3 Final   • Immature Grans, Absolute 06/01/2022 0.04  0.00 - 0.05 10*3/mm3 Final   • nRBC 06/01/2022 0.1  0.0 - 0.2 /100 WBC Final       EKG Results:  No orders to display       Imaging Results:  No Images in the past 120 days found..      Assessment & Plan   Diagnoses and all orders for this visit:    1. ADHD (attention deficit hyperactivity disorder), inattentive type (Primary)  -      lisdexamfetamine (Vyvanse) 40 MG capsule; Take 1 capsule by mouth Every Morning for 30 days  Dispense: 30 capsule; Refill: 0      Increase vyvanse to target ADHD symptoms. 5 minutes of supportive psychotherapy with goal to strengthen defenses, promote problems solving, restore adaptive functioning and provide symptom relief. The therapeutic alliance was strengthened to encourage the patient to express their thoughts and feelings. Esteem building was enhanced through praise, reassurance, normalizing and encouragement. Coping skills were enhanced to build distress tolerance skills and emotional regulation. Allowed patient to freely discuss issues without interruption or judgement with unconditional positive regard, active listening skills, and empathy. Provided a safe, confidential environment to facilitate the development of a positive therapeutic relationship and encourage open, honest communication. Assisted patient in identifying risk factors which would indicate the need for higher level of care including thoughts to harm self or others and/or self-harming behavior and encouraged patient to contact this office, call 911, or present to the nearest emergency room should any of these events occur. Assisted patient in processing session content; acknowledged and normalized patient's thoughts, feelings, and concerns by utilizing a person-centered approach in efforts to build appropriate rapport and a positive therapeutic relationship with open and honest communication. Patient given education on medication side effects, diagnosis/illness and relapse symptoms. Plan to continue supportive psychotherapy in next appointment to provide symptom relief. 4 weeks    Diagnoses: as above  Symptoms: as above  Functional status: good  Mental Status Exam: as above     Treatment plan: medication management and supportive psychotherapy  Prognosis: good  Progress: continued improvement    Visit Diagnoses:    ICD-10-CM ICD-9-CM   1.  ADHD (attention deficit hyperactivity disorder), inattentive type  F90.0 314.00       PLAN:  1. Safety: No acute safety concerns.   2. Referral: None at this time  3. Risk Assessment: Risk of self-harm acutely is moderate.  Risk factors include anxiety disorder, mood disorder, and recent psychosocial stressors (pandemic). Protective factors include no family history, denies access to guns/weapons, no present SI, no history of suicide attempts or self-harm in the past, minimal AODA, healthcare seeking, future orientation, willingness to engage in care.  Risk of self-harm chronically is also moderate, but could be further elevated in the event of treatment noncompliance and/or AODA.  4. Medications: Increase vyvanse 30mg to 40mg po qday to target ADHD. Risks, benefits, side effects discussed with patient including elevated heart rate, elevated blood pressure, irritability, insomnia, sexual dysfunction, appetite suppressing properties, psychosis.  After discussion of these risks and benefits, the patient voiced understanding and agreed to proceed. Jeovany reviewed, UDS ordered, and controlled substance agreement signed & witnessed.  5. Labs/studies: UDS November 2022 positive for amphetamines (rx)  6. Follow-up: 3 month    Patient screened positive for depression based on a PHQ-9 score of  on . Follow-up recommendations include: Suicide Risk Assessment performed.         TREATMENT PLAN/GOALS: Continue supportive psychotherapy efforts and medications as indicated. Treatment and medication options discussed during today's visit. Patient ackowledged and verbally consented to continue with current treatment plan and was educated on the importance of compliance with treatment and follow-up appointments.    MEDICATION ISSUES:  JEOVANY reviewed as expected.  Discussed medication options and treatment plan of prescribed medication as well as the risks, benefits, and side effects including potential falls, possible impaired  driving and metabolic adversities among others. Patient is agreeable to call the office with any worsening of symptoms or onset of side effects. Patient is agreeable to call 911 or go to the nearest ER should he/she begin having SI/HI. No medication side effects or related complaints today.     MEDS ORDERED DURING VISIT:  New Medications Ordered This Visit   Medications   • lisdexamfetamine (Vyvanse) 40 MG capsule     Sig: Take 1 capsule by mouth Every Morning for 30 days     Dispense:  30 capsule     Refill:  0       Return in about 12 weeks (around 7/21/2023) for Next scheduled follow up.         This document has been electronically signed by YENNIFER Loyd  April 28, 2023 16:00 EDT      Part of this note may be an electronic transcription/translation of spoken language to printed text using the Dragon Dictation System.

## 2023-05-27 DIAGNOSIS — E88.81 INSULIN RESISTANCE: ICD-10-CM

## 2023-05-30 DIAGNOSIS — F90.0 ADHD (ATTENTION DEFICIT HYPERACTIVITY DISORDER), INATTENTIVE TYPE: ICD-10-CM

## 2023-05-30 RX ORDER — METFORMIN HYDROCHLORIDE 500 MG/1
1000 TABLET, EXTENDED RELEASE ORAL
Qty: 180 TABLET | Refills: 1 | Status: SHIPPED | OUTPATIENT
Start: 2023-05-30

## 2023-05-30 NOTE — TELEPHONE ENCOUNTER
RECEIVED REFILL REQUEST FOR PTS VYVANSE.    PT DOES NOT HAVE A FOLLOW UP SCHEDULED.     I CALLED PT TO SCHEDULED FOLLOW UP RESULTING IN HAVING TO LEAVE A VOICEMAIL.    PT WILL NEED TO SCHEDULE FOLLOW UP BEFORE REFILL CAN BE INITIATED.     ORDER IS PENDED.

## 2023-05-31 NOTE — TELEPHONE ENCOUNTER
Requested: lisdexamfetamine (Vyvanse) 40 MG capsule  • Last dispensed: 4/29/2023  Called patient to schedule appt. No answer LVMTCB

## 2023-07-26 ENCOUNTER — TELEMEDICINE (OUTPATIENT)
Dept: PSYCHIATRY | Facility: CLINIC | Age: 63
End: 2023-07-26
Payer: COMMERCIAL

## 2023-07-26 DIAGNOSIS — F90.0 ADHD (ATTENTION DEFICIT HYPERACTIVITY DISORDER), INATTENTIVE TYPE: Primary | ICD-10-CM

## 2023-07-26 NOTE — PROGRESS NOTES
Subjective   Ami Lewis is a 62 y.o. female who presents today for follow up.   Mode of visit: Video  Location of provider: Home  Location of patient: Home  Does the patient consent to use a video/audio connection for your medical care today? Yes  The visit included audio and video interaction. No technical issues occurred during this visit.    Chief Complaint:  ADHD    History of Present Illness:     ADHD: things going good with work and school  Inattention:   Often fails to give close attention to details or makes careless mistakes in schoolwork, at work, or during other activities- n  Often has difficulty sustaining attention in tasks- n  Often does not seem to listen when spoken to directly- n  Often does not follow through on instructions and fails to finish duties in the workplace- n  Often has difficulty organizing tasks and activities- n  Often avoids, dislikes or is reluctant to engage in tasks that require sustained mental effort- n  Often loses things necessary for tasks or activities- n  Is often easily distracted by extraneous stimuli- n  Is often forgetful in daily activities- n  Hyperactivity and Impulsivity:   Often fidgets with or taps hands or feet-n  Often leaves seat in situations when remaining seated is expected- n  Often feels restless- n  Is often “on the go”, acting as if “driven by a motor”- n  Often talks excessively- n  Often blurts out an answer before a question has been completed- n  Often has difficulty waiting their turn- n  Often interrupts or intrudes on others- n    11/17/21 Patient had evaluation for ADHD on November 4, 2021 by Phill Bustos a licensed clinical psychologist.  Patient was found to be criteria for adult attention deficit hyperactivity disorder, combined.  Symptoms included difficulties with taking college courses, her mind wandering, feeling overwhelmed and maintaining her interest.  On the adult ADHD self-report scale symptom checklist, patient indicated  chronic difficulties with attention to details, organizing tasks, avoiding tasks requiring a lot of thoughts and fidgeting and squirming.  Her symptoms highly consistent with ADHD in adults.  Also acknowledged the majority of the listed symptoms of ADHD in the DSM-V.    Psychiatric Review of Systems: Denies symptoms of psychosis, fredrick or PTSD      PHQ-9 Depression Screening  PHQ-9 Total Score:      Little interest or pleasure in doing things?     Feeling down, depressed, or hopeless?     Trouble falling or staying asleep, or sleeping too much?     Feeling tired or having little energy?     Poor appetite or overeating?     Feeling bad about yourself - or that you are a failure or have let yourself or your family down?     Trouble concentrating on things, such as reading the newspaper or watching television?     Moving or speaking so slowly that other people could have noticed? Or the opposite - being so fidgety or restless that you have been moving around a lot more than usual?     Thoughts that you would be better off dead, or of hurting yourself in some way?     PHQ-9 Total Score       ALEXA-7  Feeling nervous, anxious or on edge: (P) Not at all  Not being able to stop or control worrying: (P) Several days  Worrying too much about different things: (P) Not at all  Trouble Relaxing: (P) Not at all  Being so restless that it is hard to sit still: (P) Not at all  Feeling afraid as if something awful might happen: (P) Not at all  Becoming easily annoyed or irritable: (P) Not at all  ALEXA 7 Total Score: (P) 1  If you checked any problems, how difficult have these problems made it for you to do your work, take care of things at home, or get along with other people: (P) Not difficult at all    Past Surgical History:  Past Surgical History:   Procedure Laterality Date     SECTION      CLUB FOOT RELEASE      TUBAL ABDOMINAL LIGATION         Problem List:  Patient Active Problem List   Diagnosis    Anxiety    Deafness     Depression    Head injury    Primary localized osteoarthrosis, ankle and foot       Allergy:   Allergies   Allergen Reactions    Codeine Itching    Flexeril [Cyclobenzaprine] Nausea And Vomiting    Sulfa Antibiotics Hives        Discontinued Medications:  Medications Discontinued During This Encounter   Medication Reason    amoxicillin (AMOXIL) 875 MG tablet Historical Med - Therapy completed    methylPREDNISolone (MEDROL) 4 MG dose pack Historical Med - Therapy completed         Current Medications:   Current Outpatient Medications   Medication Sig Dispense Refill    benzonatate (TESSALON) 100 MG capsule Take 1 capsule by mouth 3 (Three) Times a Day As Needed for Cough. 30 capsule 0    ibuprofen (ADVIL,MOTRIN) 800 MG tablet Take 1 tablet by mouth Every 6 (Six) Hours As Needed for Moderate Pain. (Patient taking differently: Take 400 mg by mouth Every 6 (Six) Hours As Needed for Moderate Pain.) 90 tablet 0    lisdexamfetamine (Vyvanse) 40 MG capsule Take 1 capsule by mouth Every Morning for 30 days 30 capsule 0    metFORMIN ER (GLUCOPHAGE-XR) 500 MG 24 hr tablet Take 2 tablets by mouth Daily With Breakfast. 180 tablet 1    vitamin D (ERGOCALCIFEROL) 1.25 MG (94930 UT) capsule capsule Take 1 capsule by mouth Every 7 (Seven) Days. 13 capsule 1     No current facility-administered medications for this visit.       Past Medical History:  Past Medical History:   Diagnosis Date    ADHD (attention deficit hyperactivity disorder)     Anxiety     Depression     Diabetes mellitus May 2022       Past Psychiatric History:  Medication Trials: Zoloft wellbutrin      Substance Abuse History:   Types: Denies    Access to Firearms: Denies    Social History     Socioeconomic History    Marital status:    Tobacco Use    Smoking status: Every Day     Packs/day: 0.50     Years: 10.00     Pack years: 5.00     Types: Cigarettes    Smokeless tobacco: Never   Vaping Use    Vaping Use: Never used   Substance and Sexual Activity     Alcohol use: Never    Drug use: Never    Sexual activity: Defer         Family History   Problem Relation Age of Onset    Breast cancer Mother     Cervical cancer Mother     COPD Mother     Lung cancer Mother     Colon polyps Mother     Diabetes Paternal Grandmother            Mental Status Exam:   Hygiene:   Good  Cooperation:  Cooperative  Eye Contact:  Good  Psychomotor Behavior: Appropriate  Affect:  Full range  Mood: normal  Hopelessness: Denies  Speech:  Normal  Thought Process:  Goal directed  Thought Content:  Normal  Suicidal:  Denies  Homicidal:  Denies  Hallucinations:  Denies  Delusion:  Denies  Memory:  Intact  Orientation:  Person, place, time and situation  Reliability:  Good  Insight:  Good  Judgement:  Good  Impulse Control:  Good  Physical/Medical Issues:  No    Review of Systems:  Review of Systems   Constitutional:  Negative for appetite change, diaphoresis, fatigue and unexpected weight change.   HENT:  Negative for drooling, tinnitus and trouble swallowing.    Eyes:  Negative for visual disturbance.   Respiratory:  Negative for cough, chest tightness and shortness of breath.    Cardiovascular:  Negative for chest pain and palpitations.   Gastrointestinal:  Negative for abdominal pain, constipation, diarrhea, nausea and vomiting.   Endocrine: Negative for cold intolerance and heat intolerance.   Genitourinary:  Negative for difficulty urinating.   Musculoskeletal:  Negative for arthralgias and myalgias.   Skin:  Negative for rash.   Allergic/Immunologic: Negative for immunocompromised state.   Neurological:  Negative for dizziness, tremors, seizures and headaches.   Psychiatric/Behavioral:  Negative for agitation, dysphoric mood, hallucinations, self-injury, sleep disturbance and suicidal ideas. The patient is not nervous/anxious.        Vital Signs:   There were no vitals taken for this visit.     Lab Results:   Admission on 03/01/2023, Discharged on 03/01/2023   Component Date Value Ref Range  Status    SARS Antigen 03/01/2023 Detected (A)  Not Detected, Presumptive Negative Final    Internal Control 03/01/2023 Passed  Passed Final    Lot Number 03/01/2023 [id number]   Final    Expiration Date 03/01/2023 10/02/2023   Final   Admission on 01/20/2023, Discharged on 01/20/2023   Component Date Value Ref Range Status    Rapid Strep A Screen 01/20/2023 Negative  Negative, VALID, INVALID, Not Performed Final    Internal Control 01/20/2023 Passed  Passed Final    Lot Number 01/20/2023 [id number]   Final    Expiration Date 01/20/2023 5/31/24   Final    SARS Antigen 01/20/2023 Not Detected  Not Detected, Presumptive Negative Final    Internal Control 01/20/2023 Passed  Passed Final    Lot Number 01/20/2023 [id number]   Final    Expiration Date 01/20/2023 7/8/23   Final    COVID19 01/20/2023 Not Detected  Not Detected - Ref. Range Final   Lab on 11/01/2022   Component Date Value Ref Range Status    Hemoglobin A1C 11/01/2022 6.20 (H)  4.80 - 5.60 % Final    Amphet/Methamphet, Screen 11/01/2022 Positive (A)  Negative Final    Barbiturates Screen, Urine 11/01/2022 Negative  Negative Final    Benzodiazepine Screen, Urine 11/01/2022 Negative  Negative Final    Cocaine Screen, Urine 11/01/2022 Negative  Negative Final    Opiate Screen 11/01/2022 Negative  Negative Final    THC, Screen, Urine 11/01/2022 Negative  Negative Final    Methadone Screen, Urine 11/01/2022 Negative  Negative Final    Oxycodone Screen, Urine 11/01/2022 Negative  Negative Final    Insulin 11/01/2022 90.0 (H)  2.6 - 24.9 uIU/mL Final    Glucose 11/01/2022 114 (H)  65 - 99 mg/dL Final    BUN 11/01/2022 13  8 - 23 mg/dL Final    Creatinine 11/01/2022 0.78  0.57 - 1.00 mg/dL Final    Sodium 11/01/2022 142  136 - 145 mmol/L Final    Potassium 11/01/2022 3.8  3.5 - 5.2 mmol/L Final    Chloride 11/01/2022 103  98 - 107 mmol/L Final    CO2 11/01/2022 26.7  22.0 - 29.0 mmol/L Final    Calcium 11/01/2022 9.4  8.6 - 10.5 mg/dL Final    Total Protein 11/01/2022 7.2   6.0 - 8.5 g/dL Final    Albumin 11/01/2022 4.00  3.50 - 5.20 g/dL Final    ALT (SGPT) 11/01/2022 51 (H)  1 - 33 U/L Final    AST (SGOT) 11/01/2022 49 (H)  1 - 32 U/L Final    Alkaline Phosphatase 11/01/2022 90  39 - 117 U/L Final    Total Bilirubin 11/01/2022 0.2  0.0 - 1.2 mg/dL Final    Globulin 11/01/2022 3.2  gm/dL Final    A/G Ratio 11/01/2022 1.3  g/dL Final    BUN/Creatinine Ratio 11/01/2022 16.7  7.0 - 25.0 Final    Anion Gap 11/01/2022 12.3  5.0 - 15.0 mmol/L Final    eGFR 11/01/2022 86.5  >60.0 mL/min/1.73 Final    National Kidney Foundation and American Society of Nephrology (ASN) Task Force recommended calculation based on the Chronic Kidney Disease Epidemiology Collaboration (CKD-EPI) equation refit without adjustment for race.    WBC 11/01/2022 8.29  3.40 - 10.80 10*3/mm3 Final    RBC 11/01/2022 4.49  3.77 - 5.28 10*6/mm3 Final    Hemoglobin 11/01/2022 14.3  12.0 - 15.9 g/dL Final    Hematocrit 11/01/2022 41.9  34.0 - 46.6 % Final    MCV 11/01/2022 93.3  79.0 - 97.0 fL Final    MCH 11/01/2022 31.8  26.6 - 33.0 pg Final    MCHC 11/01/2022 34.1  31.5 - 35.7 g/dL Final    RDW 11/01/2022 12.0 (L)  12.3 - 15.4 % Final    RDW-SD 11/01/2022 41.3  37.0 - 54.0 fl Final    MPV 11/01/2022 10.4  6.0 - 12.0 fL Final    Platelets 11/01/2022 286  140 - 450 10*3/mm3 Final    Neutrophil % 11/01/2022 48.1  42.7 - 76.0 % Final    Lymphocyte % 11/01/2022 35.8  19.6 - 45.3 % Final    Monocyte % 11/01/2022 9.8  5.0 - 12.0 % Final    Eosinophil % 11/01/2022 4.9  0.3 - 6.2 % Final    Basophil % 11/01/2022 1.2  0.0 - 1.5 % Final    Immature Grans % 11/01/2022 0.2  0.0 - 0.5 % Final    Neutrophils, Absolute 11/01/2022 3.98  1.70 - 7.00 10*3/mm3 Final    Lymphocytes, Absolute 11/01/2022 2.97  0.70 - 3.10 10*3/mm3 Final    Monocytes, Absolute 11/01/2022 0.81  0.10 - 0.90 10*3/mm3 Final    Eosinophils, Absolute 11/01/2022 0.41 (H)  0.00 - 0.40 10*3/mm3 Final    Basophils, Absolute 11/01/2022 0.10  0.00 - 0.20 10*3/mm3 Final     Immature Grans, Absolute 11/01/2022 0.02  0.00 - 0.05 10*3/mm3 Final    nRBC 11/01/2022 0.0  0.0 - 0.2 /100 WBC Final   Lab Requisition on 10/06/2022   Component Date Value Ref Range Status    QuantiFERON Incubation 10/06/2022 Incubation performed.   Final    QUANTIFERON-TB GOLD PLUS 10/06/2022 Negative  Negative Final    No response to M tuberculosis antigens detected.  Infection with M tuberculosis is unlikely, but high risk  individuals should be considered for additional testing  (ATS/IDSA/CDC Clinical Practice Guidelines, 2017). The  reference range is an Antigen minus Nil result of <0.35 IU/mL.  Chemiluminescence immunoassay methodology    QuantiFERON Criteria 10/06/2022 Comment   Final    QuantiFERON-TB Gold Plus is a qualitative indirect test for  M tuberculosis infection (including disease) and is intended for use  in conjunction with risk assessment, radiography, and other medical  and diagnostic evaluations. The QuantiFERON-TB Gold Plus result is  determined by subtracting the Nil value from either TB antigen (Ag)  value. The Mitogen tube serves as a control for the test.    QUANTIFERON TB1 AG VALUE 10/06/2022 0.24  IU/mL Final    QUANTIFERON TB2 AG VALUE 10/06/2022 0.18  IU/mL Final    QuantiFERON Nil Value 10/06/2022 0.09  IU/mL Final    QuantiFERON Mitogen Value 10/06/2022 >10.00  IU/mL Final       EKG Results:  No orders to display       Imaging Results:  No Images in the past 120 days found..      Assessment & Plan   Diagnoses and all orders for this visit:    1. ADHD (attention deficit hyperactivity disorder), inattentive type (Primary)        Increase vyvanse to target ADHD symptoms. Has tolerated medication well with no side effects.     Visit Diagnoses:    ICD-10-CM ICD-9-CM   1. ADHD (attention deficit hyperactivity disorder), inattentive type  F90.0 314.00         PLAN:  Safety: No acute safety concerns.   Referral: None at this time  Risk Assessment: Risk of self-harm acutely is moderate.   Risk factors include anxiety disorder, mood disorder, and recent psychosocial stressors (pandemic). Protective factors include no family history, denies access to guns/weapons, no present SI, no history of suicide attempts or self-harm in the past, minimal AODA, healthcare seeking, future orientation, willingness to engage in care.  Risk of self-harm chronically is also moderate, but could be further elevated in the event of treatment noncompliance and/or AODA.  Medications: Continue vyvanse 40mg po qday to target ADHD. Risks, benefits, side effects discussed with patient including elevated heart rate, elevated blood pressure, irritability, insomnia, sexual dysfunction, appetite suppressing properties, psychosis.  After discussion of these risks and benefits, the patient voiced understanding and agreed to proceed. Jeovany reviewed, UDS ordered, and controlled substance agreement signed & witnessed.  Labs/studies: UDS November 2022 positive for amphetamines (rx)  Follow-up: 3 month    Patient screened positive for depression based on a PHQ-9 score of  on . Follow-up recommendations include: Suicide Risk Assessment performed.         TREATMENT PLAN/GOALS: Continue supportive psychotherapy efforts and medications as indicated. Treatment and medication options discussed during today's visit. Patient ackowledged and verbally consented to continue with current treatment plan and was educated on the importance of compliance with treatment and follow-up appointments.    MEDICATION ISSUES:  JEOVANY reviewed as expected.  Discussed medication options and treatment plan of prescribed medication as well as the risks, benefits, and side effects including potential falls, possible impaired driving and metabolic adversities among others. Patient is agreeable to call the office with any worsening of symptoms or onset of side effects. Patient is agreeable to call 911 or go to the nearest ER should he/she begin having SI/HI. No  medication side effects or related complaints today.     MEDS ORDERED DURING VISIT:  No orders of the defined types were placed in this encounter.      Return in about 12 weeks (around 10/18/2023) for Next scheduled follow up.         This document has been electronically signed by YENNIFER Loyd  July 26, 2023 15:49 EDT      Part of this note may be an electronic transcription/translation of spoken language to printed text using the Dragon Dictation System.

## 2023-08-05 DIAGNOSIS — F90.0 ADHD (ATTENTION DEFICIT HYPERACTIVITY DISORDER), INATTENTIVE TYPE: ICD-10-CM

## 2023-08-30 DIAGNOSIS — F90.0 ADHD (ATTENTION DEFICIT HYPERACTIVITY DISORDER), INATTENTIVE TYPE: ICD-10-CM

## 2023-09-06 ENCOUNTER — TELEPHONE (OUTPATIENT)
Dept: PSYCHIATRY | Facility: CLINIC | Age: 63
End: 2023-09-06
Payer: COMMERCIAL

## 2023-09-06 ENCOUNTER — PRIOR AUTHORIZATION (OUTPATIENT)
Dept: PSYCHIATRY | Facility: CLINIC | Age: 63
End: 2023-09-06
Payer: COMMERCIAL

## 2023-09-06 DIAGNOSIS — F90.0 ADHD (ATTENTION DEFICIT HYPERACTIVITY DISORDER), INATTENTIVE TYPE: ICD-10-CM

## 2023-09-06 NOTE — TELEPHONE ENCOUNTER
Received a PA to do for the Vyvanse 40MG and it does not require a PA. Called pharmacy to inform them and they are showing the med has been discontinued. There was one from 08/07/2023 that was discontinued, but there was also another refill sent in today for the Vyvanse. Pharmacy is stating they have not received it.    Please advise.

## 2023-09-06 NOTE — TELEPHONE ENCOUNTER
Pt called stating she had to make an appt before her vyvanse could be refilled. She has an appt scheduled on 09/25/2023. Can their prescription be called in to do them until their appt?    Please advise.

## 2023-10-03 ENCOUNTER — TELEMEDICINE (OUTPATIENT)
Dept: PSYCHIATRY | Facility: CLINIC | Age: 63
End: 2023-10-03
Payer: COMMERCIAL

## 2023-10-03 DIAGNOSIS — F90.0 ADHD (ATTENTION DEFICIT HYPERACTIVITY DISORDER), INATTENTIVE TYPE: ICD-10-CM

## 2023-10-03 DIAGNOSIS — Z51.81 MEDICATION MONITORING ENCOUNTER: ICD-10-CM

## 2023-10-03 DIAGNOSIS — F90.0 ADHD (ATTENTION DEFICIT HYPERACTIVITY DISORDER), INATTENTIVE TYPE: Primary | ICD-10-CM

## 2023-10-03 RX ORDER — DEXTROAMPHETAMINE SACCHARATE, AMPHETAMINE ASPARTATE, DEXTROAMPHETAMINE SULFATE AND AMPHETAMINE SULFATE 1.25; 1.25; 1.25; 1.25 MG/1; MG/1; MG/1; MG/1
5 TABLET ORAL DAILY
Qty: 30 TABLET | Refills: 0 | Status: SHIPPED | OUTPATIENT
Start: 2023-10-03

## 2023-10-03 RX ORDER — LISDEXAMFETAMINE DIMESYLATE CAPSULES 40 MG/1
40 CAPSULE ORAL EVERY MORNING
Qty: 30 CAPSULE | Refills: 0 | Status: CANCELLED | OUTPATIENT
Start: 2023-10-03 | End: 2023-11-02

## 2023-10-03 RX ORDER — LISDEXAMFETAMINE DIMESYLATE CAPSULES 40 MG/1
40 CAPSULE ORAL EVERY MORNING
Qty: 30 CAPSULE | Refills: 0 | Status: SHIPPED | OUTPATIENT
Start: 2023-10-04 | End: 2023-11-03

## 2023-10-03 NOTE — PROGRESS NOTES
"Subjective   Ami Lewis is a 62 y.o. female who presents today for follow up.   Mode of visit: Video  Location of provider: Home  Location of patient: Home  Does the patient consent to use a video/audio connection for your medical care today? Yes  The visit included audio and video interaction. No technical issues occurred during this visit.    Chief Complaint:  ADHD    History of Present Illness:     ADHD: school going \"great\"  Inattention:   Often fails to give close attention to details or makes careless mistakes in schoolwork, at work, or during other activities- n  Often has difficulty sustaining attention in tasks- n  Often does not seem to listen when spoken to directly- n  Often does not follow through on instructions and fails to finish duties in the workplace- n  Often has difficulty organizing tasks and activities- n  Often avoids, dislikes or is reluctant to engage in tasks that require sustained mental effort- n  Often loses things necessary for tasks or activities- n  Is often easily distracted by extraneous stimuli- n  Is often forgetful in daily activities- n  Hyperactivity and Impulsivity:   Often fidgets with or taps hands or feet-n  Often leaves seat in situations when remaining seated is expected- n  Often feels restless- n  Is often “on the go”, acting as if “driven by a motor”- n  Often talks excessively- n  Often blurts out an answer before a question has been completed- n  Often has difficulty waiting their turn- n  Often interrupts or intrudes on others- n    11/17/21 Patient had evaluation for ADHD on November 4, 2021 by Phill Bustos a licensed clinical psychologist.  Patient was found to be criteria for adult attention deficit hyperactivity disorder, combined.  Symptoms included difficulties with taking college courses, her mind wandering, feeling overwhelmed and maintaining her interest.  On the adult ADHD self-report scale symptom checklist, patient indicated chronic difficulties " with attention to details, organizing tasks, avoiding tasks requiring a lot of thoughts and fidgeting and squirming.  Her symptoms highly consistent with ADHD in adults.  Also acknowledged the majority of the listed symptoms of ADHD in the DSM-V.    Psychiatric Review of Systems: Denies symptoms of psychosis, fredrick or PTSD    PHQ-9 Depression Screening  Little interest or pleasure in doing things? (P) 0-->not at all   Feeling down, depressed, or hopeless? (P) 0-->not at all   Trouble falling or staying asleep, or sleeping too much? (P) 1-->several days   Feeling tired or having little energy? (P) 1-->several days   Poor appetite or overeating? (P) 0-->not at all   Feeling bad about yourself - or that you are a failure or have let yourself or your family down? (P) 0-->not at all   Trouble concentrating on things, such as reading the newspaper or watching television? (P) 0-->not at all   Moving or speaking so slowly that other people could have noticed? Or the opposite - being so fidgety or restless that you have been moving around a lot more than usual? (P) 0-->not at all   Thoughts that you would be better off dead, or of hurting yourself in some way? (P) 0-->not at all   PHQ-9 Total Score (P) 2   If you checked off any problems, how difficult have these problems made it for you to do your work, take care of things at home, or get along with other people? (P) not difficult at all       ALEXA-7  Feeling nervous, anxious or on edge: (P) Not at all  Not being able to stop or control worrying: (P) Several days  Worrying too much about different things: (P) Not at all  Trouble Relaxing: (P) Not at all  Being so restless that it is hard to sit still: (P) Not at all  Feeling afraid as if something awful might happen: (P) Not at all  Becoming easily annoyed or irritable: (P) Not at all  ALEXA 7 Total Score: (P) 1  If you checked any problems, how difficult have these problems made it for you to do your work, take care of things  at home, or get along with other people: (P) Not difficult at all    Past Surgical History:  Past Surgical History:   Procedure Laterality Date     SECTION      CLUB FOOT RELEASE      TUBAL ABDOMINAL LIGATION         Problem List:  Patient Active Problem List   Diagnosis    Anxiety    Deafness    Depression    Head injury    Primary localized osteoarthrosis, ankle and foot       Allergy:   Allergies   Allergen Reactions    Codeine Itching    Flexeril [Cyclobenzaprine] Nausea And Vomiting    Sulfa Antibiotics Hives        Discontinued Medications:  Medications Discontinued During This Encounter   Medication Reason    lisdexamfetamine (Vyvanse) 40 MG capsule Reorder         Current Medications:   Current Outpatient Medications   Medication Sig Dispense Refill    [START ON 10/4/2023] lisdexamfetamine (Vyvanse) 40 MG capsule Take 1 capsule by mouth Every Morning for 30 days 30 capsule 0    amphetamine-dextroamphetamine (Adderall) 5 MG tablet Take 1 tablet by mouth Daily. 30 tablet 0    benzonatate (TESSALON) 100 MG capsule Take 1 capsule by mouth 3 (Three) Times a Day As Needed for Cough. 30 capsule 0    ibuprofen (ADVIL,MOTRIN) 800 MG tablet Take 1 tablet by mouth Every 6 (Six) Hours As Needed for Moderate Pain. (Patient taking differently: Take 400 mg by mouth Every 6 (Six) Hours As Needed for Moderate Pain.) 90 tablet 0    metFORMIN ER (GLUCOPHAGE-XR) 500 MG 24 hr tablet Take 2 tablets by mouth Daily With Breakfast. 180 tablet 1    vitamin D (ERGOCALCIFEROL) 1.25 MG (47836 UT) capsule capsule Take 1 capsule by mouth Every 7 (Seven) Days. 13 capsule 1     No current facility-administered medications for this visit.       Past Medical History:  Past Medical History:   Diagnosis Date    ADHD (attention deficit hyperactivity disorder)     Anxiety     Depression     Diabetes mellitus May 2022       Past Psychiatric History:  Medication Trials: Zoloft, wellbutrin      Substance Abuse History:   Types:  Denies    Access to Firearms: Denies    Social History     Socioeconomic History    Marital status:    Tobacco Use    Smoking status: Every Day     Packs/day: 0.50     Years: 10.00     Pack years: 5.00     Types: Cigarettes    Smokeless tobacco: Never   Vaping Use    Vaping Use: Never used   Substance and Sexual Activity    Alcohol use: Never    Drug use: Never    Sexual activity: Defer     Family History   Problem Relation Age of Onset    Breast cancer Mother     Cervical cancer Mother     COPD Mother     Lung cancer Mother     Colon polyps Mother     Diabetes Paternal Grandmother      Mental Status Exam:   Hygiene:   Good  Cooperation:  Cooperative  Eye Contact:  Good  Psychomotor Behavior: Appropriate  Affect:  Full range  Mood: normal  Hopelessness: Denies  Speech:  Normal  Thought Process:  Goal directed  Thought Content:  Normal  Suicidal:  Denies  Homicidal:  Denies  Hallucinations:  Denies  Delusion:  Denies  Memory:  Intact  Orientation:  Person, place, time and situation  Reliability:  Good  Insight:  Good  Judgement:  Good  Impulse Control:  Good  Physical/Medical Issues:  No    Review of Systems:  Review of Systems   Constitutional:  Negative for appetite change, diaphoresis, fatigue and unexpected weight change.   HENT:  Negative for drooling, tinnitus and trouble swallowing.    Eyes:  Negative for visual disturbance.   Respiratory:  Negative for cough, chest tightness and shortness of breath.    Cardiovascular:  Negative for chest pain and palpitations.   Gastrointestinal:  Negative for abdominal pain, constipation, diarrhea, nausea and vomiting.   Endocrine: Negative for cold intolerance and heat intolerance.   Genitourinary:  Negative for difficulty urinating.   Musculoskeletal:  Negative for arthralgias and myalgias.   Skin:  Negative for rash.   Allergic/Immunologic: Negative for immunocompromised state.   Neurological:  Negative for dizziness, tremors, seizures and headaches.    Psychiatric/Behavioral:  Negative for agitation, dysphoric mood, hallucinations, self-injury, sleep disturbance and suicidal ideas. The patient is not nervous/anxious.        Vital Signs:   There were no vitals taken for this visit.     Lab Results:   Admission on 03/01/2023, Discharged on 03/01/2023   Component Date Value Ref Range Status    SARS Antigen 03/01/2023 Detected (A)  Not Detected, Presumptive Negative Final    Internal Control 03/01/2023 Passed  Passed Final    Lot Number 03/01/2023 [id number]   Final    Expiration Date 03/01/2023 10/02/2023   Final   Admission on 01/20/2023, Discharged on 01/20/2023   Component Date Value Ref Range Status    Rapid Strep A Screen 01/20/2023 Negative  Negative, VALID, INVALID, Not Performed Final    Internal Control 01/20/2023 Passed  Passed Final    Lot Number 01/20/2023 [id number]   Final    Expiration Date 01/20/2023 5/31/24   Final    SARS Antigen 01/20/2023 Not Detected  Not Detected, Presumptive Negative Final    Internal Control 01/20/2023 Passed  Passed Final    Lot Number 01/20/2023 [id number]   Final    Expiration Date 01/20/2023 7/8/23   Final    COVID19 01/20/2023 Not Detected  Not Detected - Ref. Range Final   Lab on 11/01/2022   Component Date Value Ref Range Status    Hemoglobin A1C 11/01/2022 6.20 (H)  4.80 - 5.60 % Final    Amphet/Methamphet, Screen 11/01/2022 Positive (A)  Negative Final    Barbiturates Screen, Urine 11/01/2022 Negative  Negative Final    Benzodiazepine Screen, Urine 11/01/2022 Negative  Negative Final    Cocaine Screen, Urine 11/01/2022 Negative  Negative Final    Opiate Screen 11/01/2022 Negative  Negative Final    THC, Screen, Urine 11/01/2022 Negative  Negative Final    Methadone Screen, Urine 11/01/2022 Negative  Negative Final    Oxycodone Screen, Urine 11/01/2022 Negative  Negative Final    Insulin 11/01/2022 90.0 (H)  2.6 - 24.9 uIU/mL Final    Glucose 11/01/2022 114 (H)  65 - 99 mg/dL Final    BUN 11/01/2022 13  8 - 23 mg/dL Final     Creatinine 11/01/2022 0.78  0.57 - 1.00 mg/dL Final    Sodium 11/01/2022 142  136 - 145 mmol/L Final    Potassium 11/01/2022 3.8  3.5 - 5.2 mmol/L Final    Chloride 11/01/2022 103  98 - 107 mmol/L Final    CO2 11/01/2022 26.7  22.0 - 29.0 mmol/L Final    Calcium 11/01/2022 9.4  8.6 - 10.5 mg/dL Final    Total Protein 11/01/2022 7.2  6.0 - 8.5 g/dL Final    Albumin 11/01/2022 4.00  3.50 - 5.20 g/dL Final    ALT (SGPT) 11/01/2022 51 (H)  1 - 33 U/L Final    AST (SGOT) 11/01/2022 49 (H)  1 - 32 U/L Final    Alkaline Phosphatase 11/01/2022 90  39 - 117 U/L Final    Total Bilirubin 11/01/2022 0.2  0.0 - 1.2 mg/dL Final    Globulin 11/01/2022 3.2  gm/dL Final    A/G Ratio 11/01/2022 1.3  g/dL Final    BUN/Creatinine Ratio 11/01/2022 16.7  7.0 - 25.0 Final    Anion Gap 11/01/2022 12.3  5.0 - 15.0 mmol/L Final    eGFR 11/01/2022 86.5  >60.0 mL/min/1.73 Final    National Kidney Foundation and American Society of Nephrology (ASN) Task Force recommended calculation based on the Chronic Kidney Disease Epidemiology Collaboration (CKD-EPI) equation refit without adjustment for race.    WBC 11/01/2022 8.29  3.40 - 10.80 10*3/mm3 Final    RBC 11/01/2022 4.49  3.77 - 5.28 10*6/mm3 Final    Hemoglobin 11/01/2022 14.3  12.0 - 15.9 g/dL Final    Hematocrit 11/01/2022 41.9  34.0 - 46.6 % Final    MCV 11/01/2022 93.3  79.0 - 97.0 fL Final    MCH 11/01/2022 31.8  26.6 - 33.0 pg Final    MCHC 11/01/2022 34.1  31.5 - 35.7 g/dL Final    RDW 11/01/2022 12.0 (L)  12.3 - 15.4 % Final    RDW-SD 11/01/2022 41.3  37.0 - 54.0 fl Final    MPV 11/01/2022 10.4  6.0 - 12.0 fL Final    Platelets 11/01/2022 286  140 - 450 10*3/mm3 Final    Neutrophil % 11/01/2022 48.1  42.7 - 76.0 % Final    Lymphocyte % 11/01/2022 35.8  19.6 - 45.3 % Final    Monocyte % 11/01/2022 9.8  5.0 - 12.0 % Final    Eosinophil % 11/01/2022 4.9  0.3 - 6.2 % Final    Basophil % 11/01/2022 1.2  0.0 - 1.5 % Final    Immature Grans % 11/01/2022 0.2  0.0 - 0.5 % Final    Neutrophils,  Absolute 11/01/2022 3.98  1.70 - 7.00 10*3/mm3 Final    Lymphocytes, Absolute 11/01/2022 2.97  0.70 - 3.10 10*3/mm3 Final    Monocytes, Absolute 11/01/2022 0.81  0.10 - 0.90 10*3/mm3 Final    Eosinophils, Absolute 11/01/2022 0.41 (H)  0.00 - 0.40 10*3/mm3 Final    Basophils, Absolute 11/01/2022 0.10  0.00 - 0.20 10*3/mm3 Final    Immature Grans, Absolute 11/01/2022 0.02  0.00 - 0.05 10*3/mm3 Final    nRBC 11/01/2022 0.0  0.0 - 0.2 /100 WBC Final   Lab Requisition on 10/06/2022   Component Date Value Ref Range Status    QuantiFERON Incubation 10/06/2022 Incubation performed.   Final    QUANTIFERON-TB GOLD PLUS 10/06/2022 Negative  Negative Final    No response to M tuberculosis antigens detected.  Infection with M tuberculosis is unlikely, but high risk  individuals should be considered for additional testing  (ATS/IDSA/CDC Clinical Practice Guidelines, 2017). The  reference range is an Antigen minus Nil result of <0.35 IU/mL.  Chemiluminescence immunoassay methodology    QuantiFERON Criteria 10/06/2022 Comment   Final    QuantiFERON-TB Gold Plus is a qualitative indirect test for  M tuberculosis infection (including disease) and is intended for use  in conjunction with risk assessment, radiography, and other medical  and diagnostic evaluations. The QuantiFERON-TB Gold Plus result is  determined by subtracting the Nil value from either TB antigen (Ag)  value. The Mitogen tube serves as a control for the test.    QUANTIFERON TB1 AG VALUE 10/06/2022 0.24  IU/mL Final    QUANTIFERON TB2 AG VALUE 10/06/2022 0.18  IU/mL Final    QuantiFERON Nil Value 10/06/2022 0.09  IU/mL Final    QuantiFERON Mitogen Value 10/06/2022 >10.00  IU/mL Final       EKG Results:  No orders to display       Imaging Results:  No Images in the past 120 days found..      Assessment & Plan   Diagnoses and all orders for this visit:    1. ADHD (attention deficit hyperactivity disorder), inattentive type (Primary)  -     lisdexamfetamine (Vyvanse) 40  MG capsule; Take 1 capsule by mouth Every Morning for 30 days  Dispense: 30 capsule; Refill: 0  -     amphetamine-dextroamphetamine (Adderall) 5 MG tablet; Take 1 tablet by mouth Daily.  Dispense: 30 tablet; Refill: 0    2. Medication monitoring encounter  -     Urine Drug Screen - Urine, Clean Catch; Future      Continue vyvanse to target ADHD symptoms. Add adderall booster to target ADHD.     Visit Diagnoses:    ICD-10-CM ICD-9-CM   1. ADHD (attention deficit hyperactivity disorder), inattentive type  F90.0 314.00   2. Medication monitoring encounter  Z51.81 V58.83     PLAN:  Safety: No acute safety concerns.   Referral: None at this time  Risk Assessment: Risk of self-harm acutely is moderate.  Risk factors include anxiety disorder, mood disorder, and recent psychosocial stressors (pandemic). Protective factors include no family history, denies access to guns/weapons, no present SI, no history of suicide attempts or self-harm in the past, minimal AODA, healthcare seeking, future orientation, willingness to engage in care.  Risk of self-harm chronically is also moderate, but could be further elevated in the event of treatment noncompliance and/or AODA.  Medications: Continue vyvanse 40mg po qday to target ADHD. Risks, benefits, side effects discussed with patient including elevated heart rate, elevated blood pressure, irritability, insomnia, sexual dysfunction, appetite suppressing properties, psychosis.  After discussion of these risks and benefits, the patient voiced understanding and agreed to proceed. Irvin reviewed, UDS ordered, and controlled substance agreement signed & witnessed. Start adderall 5mg po qday to target ADHD as booster. Risks, benefits, side effects discussed with patient including elevated heart rate, elevated blood pressure, irritability, insomnia, sexual dysfunction, appetite suppressing properties, psychosis.  After discussion of these risks and benefits, the patient voiced understanding  and agreed to proceed. Jeovany reviewed, UDS ordered, and controlled substance agreement signed & witnessed.  Labs/studies: UDS November 2022 positive for amphetamines (rx); UDS   Follow-up: 3 month    Patient screened positive for depression based on a PHQ-9 score of  on . Follow-up recommendations include: Suicide Risk Assessment performed.         TREATMENT PLAN/GOALS: Continue supportive psychotherapy efforts and medications as indicated. Treatment and medication options discussed during today's visit. Patient ackowledged and verbally consented to continue with current treatment plan and was educated on the importance of compliance with treatment and follow-up appointments.    MEDICATION ISSUES:  JEOVANY reviewed as expected.  Discussed medication options and treatment plan of prescribed medication as well as the risks, benefits, and side effects including potential falls, possible impaired driving and metabolic adversities among others. Patient is agreeable to call the office with any worsening of symptoms or onset of side effects. Patient is agreeable to call 911 or go to the nearest ER should he/she begin having SI/HI. No medication side effects or related complaints today.     MEDS ORDERED DURING VISIT:  New Medications Ordered This Visit   Medications    lisdexamfetamine (Vyvanse) 40 MG capsule     Sig: Take 1 capsule by mouth Every Morning for 30 days     Dispense:  30 capsule     Refill:  0    amphetamine-dextroamphetamine (Adderall) 5 MG tablet     Sig: Take 1 tablet by mouth Daily.     Dispense:  30 tablet     Refill:  0     booster       Return in about 12 weeks (around 12/26/2023) for Next scheduled follow up.         This document has been electronically signed by YENNIFER Loyd  October 3, 2023 15:53 EDT      Part of this note may be an electronic transcription/translation of spoken language to printed text using the Dragon Dictation System.

## 2023-10-30 DIAGNOSIS — F90.0 ADHD (ATTENTION DEFICIT HYPERACTIVITY DISORDER), INATTENTIVE TYPE: ICD-10-CM

## 2023-10-31 RX ORDER — DEXTROAMPHETAMINE SACCHARATE, AMPHETAMINE ASPARTATE, DEXTROAMPHETAMINE SULFATE AND AMPHETAMINE SULFATE 1.25; 1.25; 1.25; 1.25 MG/1; MG/1; MG/1; MG/1
5 TABLET ORAL DAILY
Qty: 30 TABLET | Refills: 0 | Status: SHIPPED | OUTPATIENT
Start: 2023-11-01

## 2023-10-31 RX ORDER — LISDEXAMFETAMINE DIMESYLATE CAPSULES 40 MG/1
40 CAPSULE ORAL EVERY MORNING
Qty: 30 CAPSULE | Refills: 0 | Status: SHIPPED | OUTPATIENT
Start: 2023-11-01 | End: 2023-12-01

## 2023-11-26 DIAGNOSIS — E88.819 INSULIN RESISTANCE: ICD-10-CM

## 2023-11-26 DIAGNOSIS — F90.0 ADHD (ATTENTION DEFICIT HYPERACTIVITY DISORDER), INATTENTIVE TYPE: ICD-10-CM

## 2023-11-26 RX ORDER — DEXTROAMPHETAMINE SACCHARATE, AMPHETAMINE ASPARTATE, DEXTROAMPHETAMINE SULFATE AND AMPHETAMINE SULFATE 1.25; 1.25; 1.25; 1.25 MG/1; MG/1; MG/1; MG/1
5 TABLET ORAL DAILY
Qty: 30 TABLET | Refills: 0 | Status: CANCELLED | OUTPATIENT
Start: 2023-11-26

## 2023-11-26 RX ORDER — LISDEXAMFETAMINE DIMESYLATE CAPSULES 40 MG/1
40 CAPSULE ORAL EVERY MORNING
Qty: 30 CAPSULE | Refills: 0 | Status: CANCELLED | OUTPATIENT
Start: 2023-11-26 | End: 2023-12-26

## 2023-11-27 RX ORDER — METFORMIN HYDROCHLORIDE 500 MG/1
1000 TABLET, EXTENDED RELEASE ORAL
Qty: 180 TABLET | Refills: 1 | OUTPATIENT
Start: 2023-11-27

## 2023-11-29 DIAGNOSIS — E88.819 INSULIN RESISTANCE: ICD-10-CM

## 2023-11-29 RX ORDER — METFORMIN HYDROCHLORIDE 500 MG/1
1000 TABLET, EXTENDED RELEASE ORAL
Qty: 180 TABLET | Refills: 1 | OUTPATIENT
Start: 2023-11-29

## 2023-12-01 DIAGNOSIS — F90.0 ADHD (ATTENTION DEFICIT HYPERACTIVITY DISORDER), INATTENTIVE TYPE: ICD-10-CM

## 2023-12-04 ENCOUNTER — PRIOR AUTHORIZATION (OUTPATIENT)
Dept: PSYCHIATRY | Facility: CLINIC | Age: 63
End: 2023-12-04
Payer: COMMERCIAL

## 2023-12-04 RX ORDER — DEXTROAMPHETAMINE SACCHARATE, AMPHETAMINE ASPARTATE, DEXTROAMPHETAMINE SULFATE AND AMPHETAMINE SULFATE 1.25; 1.25; 1.25; 1.25 MG/1; MG/1; MG/1; MG/1
5 TABLET ORAL DAILY
Qty: 30 TABLET | Refills: 0 | Status: SHIPPED | OUTPATIENT
Start: 2023-12-04

## 2023-12-04 RX ORDER — LISDEXAMFETAMINE DIMESYLATE CAPSULES 40 MG/1
40 CAPSULE ORAL EVERY MORNING
Qty: 30 CAPSULE | Refills: 0 | Status: SHIPPED | OUTPATIENT
Start: 2023-12-04 | End: 2024-01-04

## 2023-12-06 ENCOUNTER — OFFICE VISIT (OUTPATIENT)
Dept: FAMILY MEDICINE CLINIC | Facility: CLINIC | Age: 63
End: 2023-12-06
Payer: COMMERCIAL

## 2023-12-06 VITALS
HEART RATE: 98 BPM | BODY MASS INDEX: 34.72 KG/M2 | HEIGHT: 69 IN | TEMPERATURE: 96.7 F | OXYGEN SATURATION: 98 % | SYSTOLIC BLOOD PRESSURE: 144 MMHG | WEIGHT: 234.4 LBS | DIASTOLIC BLOOD PRESSURE: 80 MMHG

## 2023-12-06 DIAGNOSIS — E11.9 TYPE 2 DIABETES MELLITUS WITHOUT COMPLICATION, WITHOUT LONG-TERM CURRENT USE OF INSULIN: ICD-10-CM

## 2023-12-06 DIAGNOSIS — Z00.00 ANNUAL PHYSICAL EXAM: ICD-10-CM

## 2023-12-06 DIAGNOSIS — E88.819 INSULIN RESISTANCE: Primary | ICD-10-CM

## 2023-12-06 DIAGNOSIS — Z13.220 SCREENING FOR LIPID DISORDERS: ICD-10-CM

## 2023-12-06 DIAGNOSIS — Z12.31 ENCOUNTER FOR SCREENING MAMMOGRAM FOR MALIGNANT NEOPLASM OF BREAST: ICD-10-CM

## 2023-12-06 DIAGNOSIS — E55.9 VITAMIN D DEFICIENCY: ICD-10-CM

## 2023-12-06 LAB
EXPIRATION DATE: ABNORMAL
HBA1C MFR BLD: 6.7 % (ref 4.5–5.7)
Lab: ABNORMAL

## 2023-12-06 RX ORDER — METFORMIN HYDROCHLORIDE 500 MG/1
500 TABLET, EXTENDED RELEASE ORAL
Qty: 90 TABLET | Refills: 1 | Status: SHIPPED | OUTPATIENT
Start: 2023-12-06

## 2023-12-06 NOTE — PROGRESS NOTES
"Chief Complaint  Annual Exam (Medication refills )    Subjective         Ami Lewis presents to [facility]  Presents to the office today for a follow-up.  I did explain that she is due for updated lab work.  We did do an A1c in the office and this had increased to 6.7%.  She does state that she is needing a refill on her metformin.  I also discussed starting Mounjaro to help lower her A1c.  His blood pressure was slightly elevated at 144/80.  She states that she is checking this at home and at work and states that her blood pressure is typically within range.  She is due for mammogram as well.         Objective     Vitals:    12/06/23 1124   BP: 144/80   BP Location: Right arm   Patient Position: Sitting   Cuff Size: Adult   Pulse: 98   Temp: 96.7 °F (35.9 °C)   TempSrc: Temporal   SpO2: 98%   Weight: 106 kg (234 lb 6.4 oz)   Height: 175.3 cm (69\")      Body mass index is 34.61 kg/m².    BMI is >= 30 and <35. (Class 1 Obesity). The following options were offered after discussion;: nutrition counseling/recommendations        Physical Exam  Vitals reviewed.   Constitutional:       Appearance: Normal appearance.   Cardiovascular:      Rate and Rhythm: Normal rate and regular rhythm.      Pulses: Normal pulses.      Heart sounds: Normal heart sounds, S1 normal and S2 normal. No murmur heard.  Pulmonary:      Effort: Pulmonary effort is normal. No respiratory distress.      Breath sounds: Normal breath sounds.   Skin:     General: Skin is warm and dry.   Neurological:      Mental Status: She is alert and oriented to person, place, and time.   Psychiatric:         Attention and Perception: Attention normal.         Mood and Affect: Mood normal.         Behavior: Behavior normal.          Result Review :   The following data was reviewed by: YENNIFER Ortiz on 12/06/2023:      Procedures    Assessment and Plan   Diagnoses and all orders for this visit:    1. Insulin resistance " (Primary)  -     Hemoglobin A1c; Future  -     POC Glycosylated Hemoglobin (Hb A1C)  -     metFORMIN ER (GLUCOPHAGE-XR) 500 MG 24 hr tablet; Take 1 tablet by mouth Daily With Breakfast.  Dispense: 90 tablet; Refill: 1    2. Vitamin D deficiency  -     Vitamin D,25-Hydroxy; Future    3. Annual physical exam  -     CBC (No Diff); Future  -     Comprehensive Metabolic Panel; Future  -     Lipid Panel; Future  -     TSH; Future    4. Screening for lipid disorders  -     Lipid Panel; Future    5. Type 2 diabetes mellitus without complication, without long-term current use of insulin  -     Tirzepatide (Mounjaro) 5 MG/0.5ML solution pen-injector; Inject 0.5 mL under the skin into the appropriate area as directed 1 (One) Time Per Week.  Dispense: 2 mL; Refill: 0    6. Encounter for screening mammogram for malignant neoplasm of breast  -     Mammo Screening Digital Tomosynthesis Bilateral With CAD; Future          Follow Up   Return in about 3 months (around 3/6/2024) for Recheck.  Patient was given instructions and counseling regarding her condition or for health maintenance advice. Please see specific information pulled into the AVS if appropriate.

## 2023-12-21 ENCOUNTER — TELEMEDICINE (OUTPATIENT)
Dept: PSYCHIATRY | Facility: CLINIC | Age: 63
End: 2023-12-21
Payer: COMMERCIAL

## 2023-12-21 DIAGNOSIS — F90.0 ADHD (ATTENTION DEFICIT HYPERACTIVITY DISORDER), INATTENTIVE TYPE: ICD-10-CM

## 2023-12-21 RX ORDER — LISDEXAMFETAMINE DIMESYLATE CAPSULES 40 MG/1
40 CAPSULE ORAL EVERY MORNING
Qty: 30 CAPSULE | Refills: 0 | Status: SHIPPED | OUTPATIENT
Start: 2024-01-02 | End: 2024-02-01

## 2023-12-21 RX ORDER — DEXTROAMPHETAMINE SACCHARATE, AMPHETAMINE ASPARTATE, DEXTROAMPHETAMINE SULFATE AND AMPHETAMINE SULFATE 1.25; 1.25; 1.25; 1.25 MG/1; MG/1; MG/1; MG/1
5 TABLET ORAL DAILY
Qty: 30 TABLET | Refills: 0 | Status: SHIPPED | OUTPATIENT
Start: 2024-01-02

## 2023-12-21 NOTE — PROGRESS NOTES
This provider is located at the [facility] (through [facility]), [address], [place], [address] using a secure [employer] Video Visit through [employer]. Patient is being seen remotely via telehealth at their home address in [place], and stated they are in a secure environment for this session. The patient's condition being diagnosed/treated is appropriate for telemedicine. The provider identified himself as well as his credentials.   The patient consent to be seen remotely, and when consent is given they understand that the consent allows for patient identifiable information to be sent to a third party as needed.   They may refuse to be seen remotely at any time. The electronic data is encrypted and password protected, and the patient  has been advised of the potential risks to privacy not withstanding such measures.    You have chosen to receive care through a telehealth visit.  Do you consent to use a video/audio connection for your medical care today? Yes    Patient identifiers utilized: Name and date of birth.    Patient verbally confirmed consent for today's encounter- yes    Subjective   Ami Lewis is a 63 y.o. female who presents today for follow-up appointment.     Chief Complaint:  ADHD    History of Present Illness:     Has been doing well in terms of ADHD. Passed all of her classes for the Fall semester. 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 Vyvanse helping concentration. Doesn't need the booster everyday. Accomplishing tasks effectively at work and school.     Prior Psychiatric Medications:  Zoloft, wellbutrin     The following portions of the patient's history were reviewed and updated as appropriate: allergies, current medications, past family history, past medical history, past social history, past surgical history and problem list.    Allergy:   Allergies   Allergen Reactions    Codeine  Itching    Flexeril [Cyclobenzaprine] Nausea And Vomiting    Sulfa Antibiotics Hives        Current Medications:   Current Outpatient Medications   Medication Sig Dispense Refill    [START ON 1/2/2024] amphetamine-dextroamphetamine (Adderall) 5 MG tablet Take 1 tablet by mouth Daily. 30 tablet 0    [START ON 1/2/2024] lisdexamfetamine (Vyvanse) 40 MG capsule Take 1 capsule by mouth Every Morning for 30 days 30 capsule 0    ibuprofen (ADVIL,MOTRIN) 800 MG tablet Take 1 tablet by mouth Every 6 (Six) Hours As Needed for Moderate Pain. (Patient taking differently: Take 0.5 tablets by mouth Every 6 (Six) Hours As Needed for Moderate Pain.) 90 tablet 0    metFORMIN ER (GLUCOPHAGE-XR) 500 MG 24 hr tablet Take 1 tablet by mouth Daily With Breakfast. 90 tablet 1    Tirzepatide (Mounjaro) 5 MG/0.5ML solution pen-injector Inject 0.5 mL under the skin into the appropriate area as directed 1 (One) Time Per Week. 2 mL 0    vitamin D (ERGOCALCIFEROL) 1.25 MG (87802 UT) capsule capsule Take 1 capsule by mouth Every 7 (Seven) Days. 13 capsule 1     No current facility-administered medications for this visit.       Mental Status Exam:   Hygiene:   good  Cooperation:  Cooperative  Eye Contact:  Good  Psychomotor Behavior:  Appropriate  Affect:  Full range  Mood: normal  Hopelessness: Denies  Speech:  Normal  Thought Process:  Goal directed  Thought Content:  Normal  Suicidal:  None  Homicidal:  None  Hallucinations:  None  Delusion:  None  Memory:  Intact  Orientation:  Grossly intact  Reliability:  good  Insight:  Good  Judgement:  Good  Impulse Control:  Good  Physical/Medical Issues:  No      Physical Exam:   There were no vitals taken for this visit. There is no height or weight on file to calculate BMI.   Due to the remote nature of this encounter (virtual encounter), vitals were unable to be obtained.  Weight change: n    PHQ-9 Depression Screening  Little interest or pleasure in doing things? (P) 0-->not at all   Feeling down,  depressed, or hopeless? (P) 0-->not at all   Trouble falling or staying asleep, or sleeping too much? (P) 0-->not at all   Feeling tired or having little energy? (P) 0-->not at all   Poor appetite or overeating? (P) 0-->not at all   Feeling bad about yourself - or that you are a failure or have let yourself or your family down? (P) 0-->not at all   Trouble concentrating on things, such as reading the newspaper or watching television? (P) 0-->not at all   Moving or speaking so slowly that other people could have noticed? Or the opposite - being so fidgety or restless that you have been moving around a lot more than usual? (P) 0-->not at all   Thoughts that you would be better off dead, or of hurting yourself in some way? (P) 0-->not at all   PHQ-9 Total Score (P) 0   If you checked off any problems, how difficult have these problems made it for you to do your work, take care of things at home, or get along with other people?       PHQ-9 Total Score: (P) 0    Feeling nervous, anxious or on edge: (P) Not at all  Not being able to stop or control worrying: (P) Not at all  Worrying too much about different things: (P) Not at all  Trouble Relaxing: (P) Not at all  Being so restless that it is hard to sit still: (P) Not at all  Feeling afraid as if something awful might happen: (P) Not at all  Becoming easily annoyed or irritable: (P) Not at all  ALEXA 7 Total Score: (P) 0    Previous available Provider notes and records reviewed by this APRN at today's encounter.     Visit Diagnoses:    ICD-10-CM ICD-9-CM   1. ADHD (attention deficit hyperactivity disorder), inattentive type  F90.0 314.00       TREATMENT PLAN: Continue supportive psychotherapy efforts and medications.  Medication and treatment options, both pharmacological and non-pharmacological treatment options, discussed during today's visit, including any off label use of medication. Patient acknowledged and verbally consented with current treatment plan and was  educated on the importance of compliance with treatment and follow-up appointments.      - Continue vyvanse 40mg po qday to target ADHD  - Continue adderall ir 5mg po qday at lunch as booster to target ADHD    Labs: UDS to be done Saturday  Therapy: Defers    MEDICATION ISSUES:  Discussed treatment plan and medication options of prescribed medication as well as the risks, benefits, any black box warnings, and side effects including potential falls, possible impaired driving, and metabolic adversities among others, including any off label use of medication. Patient is agreeable to call the office with any worsening of symptoms or onset of side effects, or if any concerns or questions arise.  The contact information for the office is made available to the patient. Patient is agreeable to call 911 or go to the nearest ER should they begin having any SI/HI, or if any urgent concerns arise. No medication side effects or related complaints today. JEOVANY reviewed as expected.    RISK ASSESSMENT:  Risk of self-harm acutely is low.  Risk factors include and recent psychosocial stressors (pandemic). Protective factors include no family history, denies access to guns/weapons, no present SI, no history of suicide attempts or self-harm in the past, minimal AODA, healthcare seeking, future orientation, willingness to engage in care.  Risk of self-harm chronically is also low, but could be further elevated in the event of treatment noncompliance and/or AODA.    VERBAL INFORMED CONSENT FOR MEDICATION:  The patient was educated that their proposed/prescribed psychotropic medication(s) has potential risks, side effects, adverse effects, and black box warnings; and these have been discussed with the patient.  The patient has been informed that their treatment and medication dosage is to be individualized, and may even be above or below the recommended range/dosage due to patient individualization and response, but medication is  prescribed using a shared decision making approach, and no medication or dosage will be prescribed without the patient's verbal consent.  The reason for the use of the medication including any off label use and alternative modes of treatment other than or in addition to medication has been considered and discussed, the probable consequences of not receiving the proposed treatment have been discussed, and any treatment side effects, black box warnings, and cautions associated with treatment have been discussed with the patient.  The patient is allowed ample time to openly discuss and ask questions regarding the proposed medication(s) and treatment plan and the patient verbalizes understanding the reasons for the use of the medication, its potential risks and benefits, other alternative treatment(s), and the probable consequences that may occur if the proposed medication is not given.  The patient has been given ample time to ask questions and study the information and find the information to be specific, accurate, and complete.  The patient gives verbal consent for the medication(s) proposed/prescribed, they verbalized understanding that they can refuse and withdraw consent at any time with the assistance of this APRN, and the patient has verbally confirmed that they are aware, and are willing, to take the prescribed medication and follow the treatment plan with the known possible risks, side effect, black box warnings, and any potential medication interactions, and the patient reports they will be worse off without this medication and treatment plan.  The patient is advised to contact this APRN/this office if any questions or concerns arise at any time (at [phone]), or call 911/go to the closest emergency department if needed or outside of office hours.      [facility] No Show Policy:  We understand unexpected circumstances arise; however, anytime you miss your appointment we are unable  to provide you appropriate care.  In addition, each appointment missed could have been used to provide care for others.  We ask that you call at least 24 hours in advance to cancel or reschedule an appointment.  We would like to take this opportunity to remind you of our policy stating patients who miss THREE or more appointments without cancelling or rescheduling 24 hours in advance of the appointment may be subject to cancellation of any further visits with our clinic and recommendation to seek in-person services/visits.    Please call [phone] to reschedule your appointment. If there are reasons that make it difficult for you to keep the appointments, please call and let us know how we can help.  Please understand that medication prescribing will not continue without seeing your provider.      [facility]'s No Show Policy reviewed with patient at today's visit. Patient verbalized understanding of this policy. Discussed with patient that in the event that there are three or more no show visits, it will be recommended that they pursue in-person services/visits as noncompliance with telehealth visits indicates that patient is not an appropriate candidate for telemedicine and would likely be more appropriate for in-person services/visits. Patient verbalizes understanding and is agreeable to this.    MEDS ORDERED DURING VISIT:  New Medications Ordered This Visit   Medications    lisdexamfetamine (Vyvanse) 40 MG capsule     Sig: Take 1 capsule by mouth Every Morning for 30 days     Dispense:  30 capsule     Refill:  0    amphetamine-dextroamphetamine (Adderall) 5 MG tablet     Sig: Take 1 tablet by mouth Daily.     Dispense:  30 tablet     Refill:  0     booster       Return in about 12 weeks (around 3/14/2024) for Next scheduled follow up.         Progress toward goal: At goal    Functional Status: No impairment    Prognosis: Good with Ongoing Treatment     This document has been  electronically signed by YENNIFER Loyd  December 21, 2023 15:56 EST      Please note that portions of this note were completed with a voice recognition program.

## 2023-12-23 DIAGNOSIS — M19.90 OSTEOARTHRITIS, UNSPECIFIED OSTEOARTHRITIS TYPE, UNSPECIFIED SITE: ICD-10-CM

## 2023-12-26 RX ORDER — IBUPROFEN 800 MG/1
800 TABLET ORAL EVERY 6 HOURS PRN
Qty: 90 TABLET | Refills: 0 | Status: SHIPPED | OUTPATIENT
Start: 2023-12-26

## 2024-01-04 ENCOUNTER — TELEPHONE (OUTPATIENT)
Dept: PSYCHIATRY | Facility: CLINIC | Age: 64
End: 2024-01-04
Payer: COMMERCIAL

## 2024-01-04 DIAGNOSIS — F90.0 ADHD (ATTENTION DEFICIT HYPERACTIVITY DISORDER), INATTENTIVE TYPE: ICD-10-CM

## 2024-01-04 RX ORDER — LISDEXAMFETAMINE DIMESYLATE CAPSULES 40 MG/1
40 CAPSULE ORAL EVERY MORNING
Qty: 30 CAPSULE | Refills: 0 | Status: SHIPPED | OUTPATIENT
Start: 2024-01-04 | End: 2024-02-03

## 2024-01-04 NOTE — TELEPHONE ENCOUNTER
Pt called stating that  pharmacy is out of the [employer]. Called pharmacy cancel script.Pt request for the script sent to [facility]. Please advise

## 2024-01-05 ENCOUNTER — TELEPHONE (OUTPATIENT)
Dept: FAMILY MEDICINE CLINIC | Facility: CLINIC | Age: 64
End: 2024-01-05
Payer: COMMERCIAL

## 2024-01-05 NOTE — TELEPHONE ENCOUNTER
PATIENT CALLED REGARDING HER MONJARO AND GETTING A PRIOR AUTHORIZATION.  HER PHARMACY WAS PREVIOUSLY OUT OF STOCK AND NOW  THEY HAVE IT BACK IN STOCK BUT ARE NEEDING A PRIOR AUTHORIZATION.  PATIENT IS WANTING TO KNOW THE STATUS OR WHAT SHE NEEDS TO DO.  SHE HAS BEEN OUT OF MEDICATION FOR 3 DAYS.

## 2024-01-11 DIAGNOSIS — F90.0 ADHD (ATTENTION DEFICIT HYPERACTIVITY DISORDER), INATTENTIVE TYPE: ICD-10-CM

## 2024-01-11 RX ORDER — LISDEXAMFETAMINE DIMESYLATE CAPSULES 40 MG/1
40 CAPSULE ORAL EVERY MORNING
Qty: 30 CAPSULE | Refills: 0 | Status: SHIPPED | OUTPATIENT
Start: 2024-01-11 | End: 2024-01-15

## 2024-01-11 NOTE — TELEPHONE ENCOUNTER
Patient states that [email or web address] is out of stock for Vyvanse. Prescription has been cancelled at [facility].  [facility].  Please advise.

## 2024-01-12 ENCOUNTER — TELEPHONE (OUTPATIENT)
Dept: PSYCHIATRY | Facility: CLINIC | Age: 64
End: 2024-01-12
Payer: COMMERCIAL

## 2024-01-12 DIAGNOSIS — F90.0 ADHD (ATTENTION DEFICIT HYPERACTIVITY DISORDER), INATTENTIVE TYPE: Primary | ICD-10-CM

## 2024-01-12 NOTE — TELEPHONE ENCOUNTER
Pt called and stated that they would like to request their Vyvanse to be changed to something else that compares to Vyvanse. Pt stated they have had it sent to multiple pharmacies and none of them have it in stock. Please advise

## 2024-01-15 ENCOUNTER — PRIOR AUTHORIZATION (OUTPATIENT)
Dept: PSYCHIATRY | Facility: CLINIC | Age: 64
End: 2024-01-15
Payer: COMMERCIAL

## 2024-01-15 RX ORDER — DEXTROAMPHETAMINE SACCHARATE, AMPHETAMINE ASPARTATE MONOHYDRATE, DEXTROAMPHETAMINE SULFATE AND AMPHETAMINE SULFATE 3.75; 3.75; 3.75; 3.75 MG/1; MG/1; MG/1; MG/1
15 CAPSULE, EXTENDED RELEASE ORAL DAILY
Qty: 30 CAPSULE | Refills: 0 | Status: SHIPPED | OUTPATIENT
Start: 2024-01-15

## 2024-01-15 NOTE — PROGRESS NOTES
Spoke with patient. Unable to get vyvanse due to shortage. Stop vyvanse 40mg po qday. Start adderall xr 15mg po qday to target ADHD. Risks, benefits, side effects discussed with patient including elevated heart rate, elevated blood pressure, irritability, insomnia, sexual dysfunction, appetite suppressing properties, psychosis.  After discussion of these risks and benefits, the patient voiced understanding and agreed to proceed. Irvin reviewed, UDS ordered, and controlled substance agreement signed & witnessed.

## 2024-01-19 DIAGNOSIS — E11.65 TYPE 2 DIABETES MELLITUS WITH HYPERGLYCEMIA, WITHOUT LONG-TERM CURRENT USE OF INSULIN: Primary | ICD-10-CM

## 2024-01-19 DIAGNOSIS — E55.9 VITAMIN D DEFICIENCY: ICD-10-CM

## 2024-01-22 RX ORDER — ERGOCALCIFEROL 1.25 MG/1
50000 CAPSULE ORAL
Qty: 13 CAPSULE | Refills: 1 | Status: SHIPPED | OUTPATIENT
Start: 2024-01-22

## 2024-02-13 DIAGNOSIS — F90.0 ADHD (ATTENTION DEFICIT HYPERACTIVITY DISORDER), INATTENTIVE TYPE: ICD-10-CM

## 2024-02-13 RX ORDER — DEXTROAMPHETAMINE SACCHARATE, AMPHETAMINE ASPARTATE MONOHYDRATE, DEXTROAMPHETAMINE SULFATE AND AMPHETAMINE SULFATE 3.75; 3.75; 3.75; 3.75 MG/1; MG/1; MG/1; MG/1
15 CAPSULE, EXTENDED RELEASE ORAL DAILY
Qty: 30 CAPSULE | Refills: 0 | Status: CANCELLED | OUTPATIENT
Start: 2024-02-13

## 2024-02-14 ENCOUNTER — TELEMEDICINE (OUTPATIENT)
Dept: PSYCHIATRY | Facility: CLINIC | Age: 64
End: 2024-02-14
Payer: COMMERCIAL

## 2024-02-14 DIAGNOSIS — F90.0 ADHD (ATTENTION DEFICIT HYPERACTIVITY DISORDER), INATTENTIVE TYPE: ICD-10-CM

## 2024-02-14 RX ORDER — DEXTROAMPHETAMINE SACCHARATE, AMPHETAMINE ASPARTATE, DEXTROAMPHETAMINE SULFATE AND AMPHETAMINE SULFATE 1.25; 1.25; 1.25; 1.25 MG/1; MG/1; MG/1; MG/1
5 TABLET ORAL DAILY
Qty: 30 TABLET | Refills: 0 | Status: SHIPPED | OUTPATIENT
Start: 2024-02-14

## 2024-02-14 RX ORDER — DEXTROAMPHETAMINE SACCHARATE, AMPHETAMINE ASPARTATE MONOHYDRATE, DEXTROAMPHETAMINE SULFATE AND AMPHETAMINE SULFATE 6.25; 6.25; 6.25; 6.25 MG/1; MG/1; MG/1; MG/1
25 CAPSULE, EXTENDED RELEASE ORAL DAILY
Qty: 30 CAPSULE | Refills: 0 | Status: SHIPPED | OUTPATIENT
Start: 2024-02-14

## 2024-02-16 ENCOUNTER — TELEPHONE (OUTPATIENT)
Dept: PSYCHIATRY | Facility: CLINIC | Age: 64
End: 2024-02-16
Payer: COMMERCIAL

## 2024-02-16 DIAGNOSIS — F90.0 ADHD (ATTENTION DEFICIT HYPERACTIVITY DISORDER), INATTENTIVE TYPE: ICD-10-CM

## 2024-02-16 RX ORDER — DEXTROAMPHETAMINE SACCHARATE, AMPHETAMINE ASPARTATE, DEXTROAMPHETAMINE SULFATE AND AMPHETAMINE SULFATE 1.25; 1.25; 1.25; 1.25 MG/1; MG/1; MG/1; MG/1
5 TABLET ORAL DAILY
Qty: 30 TABLET | Refills: 0 | Status: CANCELLED | OUTPATIENT
Start: 2024-02-16

## 2024-02-16 RX ORDER — DEXTROAMPHETAMINE SACCHARATE, AMPHETAMINE ASPARTATE MONOHYDRATE, DEXTROAMPHETAMINE SULFATE AND AMPHETAMINE SULFATE 6.25; 6.25; 6.25; 6.25 MG/1; MG/1; MG/1; MG/1
25 CAPSULE, EXTENDED RELEASE ORAL DAILY
Qty: 30 CAPSULE | Refills: 0 | Status: CANCELLED | OUTPATIENT
Start: 2024-02-16

## 2024-02-16 NOTE — TELEPHONE ENCOUNTER
Patient sent in a refill request.  Provider sent refills to pharmacy on 02/14/2024.  Called pharmacy to verify they had received prescriptions.  Pharmacy stated medication is ready for patient to .  Contacted patient but did not get an answer. Left a voicemail as well as sent a my chart message.

## 2024-03-19 DIAGNOSIS — F90.0 ADHD (ATTENTION DEFICIT HYPERACTIVITY DISORDER), INATTENTIVE TYPE: ICD-10-CM

## 2024-03-19 RX ORDER — DEXTROAMPHETAMINE SACCHARATE, AMPHETAMINE ASPARTATE, DEXTROAMPHETAMINE SULFATE AND AMPHETAMINE SULFATE 1.25; 1.25; 1.25; 1.25 MG/1; MG/1; MG/1; MG/1
5 TABLET ORAL DAILY
Qty: 30 TABLET | Refills: 0 | Status: SHIPPED | OUTPATIENT
Start: 2024-03-19

## 2024-03-19 RX ORDER — DEXTROAMPHETAMINE SACCHARATE, AMPHETAMINE ASPARTATE MONOHYDRATE, DEXTROAMPHETAMINE SULFATE AND AMPHETAMINE SULFATE 6.25; 6.25; 6.25; 6.25 MG/1; MG/1; MG/1; MG/1
25 CAPSULE, EXTENDED RELEASE ORAL DAILY
Qty: 30 CAPSULE | Refills: 0 | Status: SHIPPED | OUTPATIENT
Start: 2024-03-19

## 2024-03-20 ENCOUNTER — LAB (OUTPATIENT)
Dept: LAB | Facility: HOSPITAL | Age: 64
End: 2024-03-20
Payer: COMMERCIAL

## 2024-03-20 DIAGNOSIS — E88.819 INSULIN RESISTANCE: ICD-10-CM

## 2024-03-20 DIAGNOSIS — Z00.00 ANNUAL PHYSICAL EXAM: ICD-10-CM

## 2024-03-20 DIAGNOSIS — Z13.220 SCREENING FOR LIPID DISORDERS: ICD-10-CM

## 2024-03-20 DIAGNOSIS — E55.9 VITAMIN D DEFICIENCY: ICD-10-CM

## 2024-03-20 LAB
25(OH)D3 SERPL-MCNC: 37.2 NG/ML (ref 30–100)
ALBUMIN SERPL-MCNC: 4.3 G/DL (ref 3.5–5.2)
ALBUMIN/GLOB SERPL: 1.3 G/DL
ALP SERPL-CCNC: 95 U/L (ref 39–117)
ALT SERPL W P-5'-P-CCNC: 22 U/L (ref 1–33)
ANION GAP SERPL CALCULATED.3IONS-SCNC: 13.1 MMOL/L (ref 5–15)
AST SERPL-CCNC: 24 U/L (ref 1–32)
BILIRUB SERPL-MCNC: 0.2 MG/DL (ref 0–1.2)
BUN SERPL-MCNC: 19 MG/DL (ref 8–23)
BUN/CREAT SERPL: 26.4 (ref 7–25)
CALCIUM SPEC-SCNC: 10.1 MG/DL (ref 8.6–10.5)
CHLORIDE SERPL-SCNC: 106 MMOL/L (ref 98–107)
CHOLEST SERPL-MCNC: 191 MG/DL (ref 0–200)
CO2 SERPL-SCNC: 21.9 MMOL/L (ref 22–29)
CREAT SERPL-MCNC: 0.72 MG/DL (ref 0.57–1)
DEPRECATED RDW RBC AUTO: 39.4 FL (ref 37–54)
EGFRCR SERPLBLD CKD-EPI 2021: 94.7 ML/MIN/1.73
ERYTHROCYTE [DISTWIDTH] IN BLOOD BY AUTOMATED COUNT: 11.9 % (ref 12.3–15.4)
GLOBULIN UR ELPH-MCNC: 3.2 GM/DL
GLUCOSE SERPL-MCNC: 95 MG/DL (ref 65–99)
HBA1C MFR BLD: 5.6 % (ref 4.8–5.6)
HCT VFR BLD AUTO: 45.1 % (ref 34–46.6)
HDLC SERPL-MCNC: 42 MG/DL (ref 40–60)
HGB BLD-MCNC: 15.6 G/DL (ref 12–15.9)
LDLC SERPL CALC-MCNC: 122 MG/DL (ref 0–100)
LDLC/HDLC SERPL: 2.81 {RATIO}
MCH RBC QN AUTO: 31.3 PG (ref 26.6–33)
MCHC RBC AUTO-ENTMCNC: 34.6 G/DL (ref 31.5–35.7)
MCV RBC AUTO: 90.4 FL (ref 79–97)
PLATELET # BLD AUTO: 266 10*3/MM3 (ref 140–450)
PMV BLD AUTO: 10.3 FL (ref 6–12)
POTASSIUM SERPL-SCNC: 3.8 MMOL/L (ref 3.5–5.2)
PROT SERPL-MCNC: 7.5 G/DL (ref 6–8.5)
RBC # BLD AUTO: 4.99 10*6/MM3 (ref 3.77–5.28)
SODIUM SERPL-SCNC: 141 MMOL/L (ref 136–145)
TRIGL SERPL-MCNC: 154 MG/DL (ref 0–150)
TSH SERPL DL<=0.05 MIU/L-ACNC: 2.16 UIU/ML (ref 0.27–4.2)
VLDLC SERPL-MCNC: 27 MG/DL (ref 5–40)
WBC NRBC COR # BLD AUTO: 9.84 10*3/MM3 (ref 3.4–10.8)

## 2024-03-20 PROCEDURE — 80307 DRUG TEST PRSMV CHEM ANLYZR: CPT | Performed by: NURSE PRACTITIONER

## 2024-03-20 PROCEDURE — 36415 COLL VENOUS BLD VENIPUNCTURE: CPT

## 2024-03-20 PROCEDURE — 80061 LIPID PANEL: CPT

## 2024-03-20 PROCEDURE — 83036 HEMOGLOBIN GLYCOSYLATED A1C: CPT

## 2024-03-20 PROCEDURE — 82306 VITAMIN D 25 HYDROXY: CPT

## 2024-03-20 PROCEDURE — 80050 GENERAL HEALTH PANEL: CPT

## 2024-03-26 ENCOUNTER — TELEMEDICINE (OUTPATIENT)
Dept: PSYCHIATRY | Facility: CLINIC | Age: 64
End: 2024-03-26
Payer: COMMERCIAL

## 2024-03-26 DIAGNOSIS — F90.0 ADHD (ATTENTION DEFICIT HYPERACTIVITY DISORDER), INATTENTIVE TYPE: Primary | ICD-10-CM

## 2024-03-26 RX ORDER — DEXTROAMPHETAMINE SACCHARATE, AMPHETAMINE ASPARTATE MONOHYDRATE, DEXTROAMPHETAMINE SULFATE AND AMPHETAMINE SULFATE 7.5; 7.5; 7.5; 7.5 MG/1; MG/1; MG/1; MG/1
30 CAPSULE, EXTENDED RELEASE ORAL EVERY MORNING
Qty: 30 CAPSULE | Refills: 0 | Status: SHIPPED | OUTPATIENT
Start: 2024-04-17

## 2024-03-26 RX ORDER — DEXTROAMPHETAMINE SACCHARATE, AMPHETAMINE ASPARTATE, DEXTROAMPHETAMINE SULFATE AND AMPHETAMINE SULFATE 1.25; 1.25; 1.25; 1.25 MG/1; MG/1; MG/1; MG/1
5 TABLET ORAL DAILY
Qty: 30 TABLET | Refills: 0 | Status: SHIPPED | OUTPATIENT
Start: 2024-04-17

## 2024-03-26 NOTE — PROGRESS NOTES
This provider is located at the [facility] (through [facility]), [address], [facility], [address] using a secure [employer] Video Visit through [employer]. Patient is being seen remotely via telehealth at their home address in [place], and stated they are in a secure environment for this session. The patient's condition being diagnosed/treated is appropriate for telemedicine. The provider identified himself as well as his credentials.   The patient consent to be seen remotely, and when consent is given they understand that the consent allows for patient identifiable information to be sent to a third party as needed.   They may refuse to be seen remotely at any time. The electronic data is encrypted and password protected, and the patient  has been advised of the potential risks to privacy not withstanding such measures.    You have chosen to receive care through a telehealth visit.  Do you consent to use a video/audio connection for your medical care today? Yes    Patient identifiers utilized: Name and date of birth.    Patient verbally confirmed consent for today's encounter- yes    Subjective   Ami Lewis is a 63 y.o. female who presents today for follow-up appointment.     Chief Complaint:  ADHD    History of Present Illness:     Feels as though the adderall is helping with concentration. Does feel like it wears off later in the day. More issues with completing tasks later in the day. Passed her semester in college. Has one more semester left and graduates in June.     Prior Psychiatric Medications:  Zoloft, wellbutrin, adderall, adderall xr, vyvanse    The following portions of the patient's history were reviewed and updated as appropriate: allergies, current medications, past family history, past medical history, past social history, past surgical history and problem list.    Allergy:   Allergies   Allergen Reactions    Codeine Itching    Flexeril [Cyclobenzaprine]  Nausea And Vomiting    Sulfa Antibiotics Hives        Current Medications:   Current Outpatient Medications   Medication Sig Dispense Refill    [START ON 4/17/2024] amphetamine-dextroamphetamine (Adderall) 5 MG tablet Take 1 tablet by mouth Daily. 30 tablet 0    [START ON 4/17/2024] amphetamine-dextroamphetamine XR (Adderall XR) 30 MG 24 hr capsule Take 1 capsule by mouth Every Morning 30 capsule 0    ibuprofen (ADVIL,MOTRIN) 800 MG tablet Take 1 tablet by mouth Every 6 (Six) Hours As Needed for Moderate Pain. 90 tablet 0    metFORMIN ER (GLUCOPHAGE-XR) 500 MG 24 hr tablet Take 1 tablet by mouth Daily With Breakfast. 90 tablet 1    Tirzepatide (MOUNJARO) 7.5 MG/0.5ML solution pen-injector pen Inject 0.5 mL under the skin into the appropriate area as directed 1 (One) Time Per Week. 2 mL 2    vitamin D (ERGOCALCIFEROL) 1.25 MG (33764 UT) capsule capsule Take 1 capsule by mouth Every 7 (Seven) Days. 13 capsule 1     No current facility-administered medications for this visit.       Mental Status Exam:   Hygiene:   good  Cooperation:  Cooperative  Eye Contact:  Good  Psychomotor Behavior:  Appropriate  Affect:  Full range  Mood: normal  Hopelessness: Denies  Speech:  Normal  Thought Process:  Goal directed  Thought Content:  Normal  Suicidal:  None  Homicidal:  None  Hallucinations:  None  Delusion:  None  Memory:  Intact  Orientation:  Grossly intact  Reliability:  good  Insight:  Good  Judgement:  Good  Impulse Control:  Good  Physical/Medical Issues:  No      Physical Exam:   There were no vitals taken for this visit. There is no height or weight on file to calculate BMI.   Due to the remote nature of this encounter (virtual encounter), vitals were unable to be obtained.  Weight change: n    PHQ-9 Depression Screening  Little interest or pleasure in doing things? (P) 0-->not at all   Feeling down, depressed, or hopeless? (P) 0-->not at all   Trouble falling or staying asleep, or sleeping too much? (P) 0-->not at all    Feeling tired or having little energy? (P) 1-->several days   Poor appetite or overeating? (P) 0-->not at all   Feeling bad about yourself - or that you are a failure or have let yourself or your family down? (P) 0-->not at all   Trouble concentrating on things, such as reading the newspaper or watching television? (P) 1-->several days   Moving or speaking so slowly that other people could have noticed? Or the opposite - being so fidgety or restless that you have been moving around a lot more than usual? (P) 0-->not at all   Thoughts that you would be better off dead, or of hurting yourself in some way? (P) 0-->not at all   PHQ-9 Total Score (P) 2   If you checked off any problems, how difficult have these problems made it for you to do your work, take care of things at home, or get along with other people? (P) not difficult at all     PHQ-9 Total Score: (P) 2    Feeling nervous, anxious or on edge: (P) Several days  Not being able to stop or control worrying: (P) Several days  Worrying too much about different things: (P) Not at all  Trouble Relaxing: (P) Not at all  Being so restless that it is hard to sit still: (P) Not at all  Feeling afraid as if something awful might happen: (P) Not at all  Becoming easily annoyed or irritable: (P) Not at all  ALEXA 7 Total Score: (P) 2  If you checked any problems, how difficult have these problems made it for you to do your work, take care of things at home, or get along with other people: (P) Not difficult at all    Previous available Provider notes and records reviewed by this APRN at today's encounter.     Visit Diagnoses:    ICD-10-CM ICD-9-CM   1. ADHD (attention deficit hyperactivity disorder), inattentive type  F90.0 314.00       TREATMENT PLAN: Continue supportive psychotherapy efforts and medications.  Medication and treatment options, both pharmacological and non-pharmacological treatment options, discussed during today's visit, including any off label use of  medication. Patient acknowledged and verbally consented with current treatment plan and was educated on the importance of compliance with treatment and follow-up appointments.      - Increase adderall xr 25mg to 30mg po qday and continue adderall ir 5mg po qday to target ADHD. Tolerating medication well with no side effects.     Labs: UDS March 2024  Therapy: Defers    MEDICATION ISSUES:  Discussed treatment plan and medication options of prescribed medication as well as the risks, benefits, any black box warnings, and side effects including potential falls, possible impaired driving, and metabolic adversities among others, including any off label use of medication. Patient is agreeable to call the office with any worsening of symptoms or onset of side effects, or if any concerns or questions arise.  The contact information for the office is made available to the patient. Patient is agreeable to call 911 or go to the nearest ER should they begin having any SI/HI, or if any urgent concerns arise. No medication side effects or related complaints today. JEOVANY reviewed as expected.    RISK ASSESSMENT:  Risk of self-harm acutely is low.  Risk factors include recent psychosocial stressors (pandemic). Protective factors include no family history, denies access to guns/weapons, no present SI, no history of suicide attempts or self-harm in the past, minimal AODA, healthcare seeking, future orientation, willingness to engage in care.  Risk of self-harm chronically is also low, but could be further elevated in the event of treatment noncompliance and/or AODA.    VERBAL INFORMED CONSENT FOR MEDICATION:  The patient was educated that their proposed/prescribed psychotropic medication(s) has potential risks, side effects, adverse effects, and black box warnings; and these have been discussed with the patient.  The patient has been informed that their treatment and medication dosage is to be individualized, and may even be above  or below the recommended range/dosage due to patient individualization and response, but medication is prescribed using a shared decision making approach, and no medication or dosage will be prescribed without the patient's verbal consent.  The reason for the use of the medication including any off label use and alternative modes of treatment other than or in addition to medication has been considered and discussed, the probable consequences of not receiving the proposed treatment have been discussed, and any treatment side effects, black box warnings, and cautions associated with treatment have been discussed with the patient.  The patient is allowed ample time to openly discuss and ask questions regarding the proposed medication(s) and treatment plan and the patient verbalizes understanding the reasons for the use of the medication, its potential risks and benefits, other alternative treatment(s), and the probable consequences that may occur if the proposed medication is not given.  The patient has been given ample time to ask questions and study the information and find the information to be specific, accurate, and complete.  The patient gives verbal consent for the medication(s) proposed/prescribed, they verbalized understanding that they can refuse and withdraw consent at any time with the assistance of this APRN, and the patient has verbally confirmed that they are aware, and are willing, to take the prescribed medication and follow the treatment plan with the known possible risks, side effect, black box warnings, and any potential medication interactions, and the patient reports they will be worse off without this medication and treatment plan.  The patient is advised to contact this APRN/this office if any questions or concerns arise at any time (at [phone]), or call 911/go to the closest emergency department if needed or outside of office hours.      [facility] No Show  Policy:  We understand unexpected circumstances arise; however, anytime you miss your appointment we are unable to provide you appropriate care.  In addition, each appointment missed could have been used to provide care for others.  We ask that you call at least 24 hours in advance to cancel or reschedule an appointment.  We would like to take this opportunity to remind you of our policy stating patients who miss THREE or more appointments without cancelling or rescheduling 24 hours in advance of the appointment may be subject to cancellation of any further visits with our clinic and recommendation to seek in-person services/visits.    Please call [phone] to reschedule your appointment. If there are reasons that make it difficult for you to keep the appointments, please call and let us know how we can help.  Please understand that medication prescribing will not continue without seeing your provider.      [facility]'s No Show Policy reviewed with patient at today's visit. Patient verbalized understanding of this policy. Discussed with patient that in the event that there are three or more no show visits, it will be recommended that they pursue in-person services/visits as noncompliance with telehealth visits indicates that patient is not an appropriate candidate for telemedicine and would likely be more appropriate for in-person services/visits. Patient verbalizes understanding and is agreeable to this.    MEDS ORDERED DURING VISIT:  New Medications Ordered This Visit   Medications    amphetamine-dextroamphetamine XR (Adderall XR) 30 MG 24 hr capsule     Sig: Take 1 capsule by mouth Every Morning     Dispense:  30 capsule     Refill:  0    amphetamine-dextroamphetamine (Adderall) 5 MG tablet     Sig: Take 1 tablet by mouth Daily.     Dispense:  30 tablet     Refill:  0     booster       Return in about 12 weeks (around 6/18/2024) for Next scheduled follow up.         Progress toward  goal: Not at goal    Functional Status: No impairment    Prognosis: Good with Ongoing Treatment     This document has been electronically signed by YENNIFER Loyd  March 26, 2024 15:53 EDT      Please note that portions of this note were completed with a voice recognition program.

## 2024-04-02 ENCOUNTER — HOSPITAL ENCOUNTER (OUTPATIENT)
Dept: MAMMOGRAPHY | Facility: HOSPITAL | Age: 64
Discharge: HOME | End: 2024-04-02
Admitting: NURSE PRACTITIONER
Payer: COMMERCIAL

## 2024-04-02 ENCOUNTER — TELEPHONE (OUTPATIENT)
Dept: FAMILY MEDICINE CLINIC | Facility: CLINIC | Age: 64
End: 2024-04-02
Payer: COMMERCIAL

## 2024-04-02 DIAGNOSIS — Z12.31 ENCOUNTER FOR SCREENING MAMMOGRAM FOR MALIGNANT NEOPLASM OF BREAST: ICD-10-CM

## 2024-04-02 PROCEDURE — 77063 BREAST TOMOSYNTHESIS BI: CPT

## 2024-04-02 PROCEDURE — 77067 SCR MAMMO BI INCL CAD: CPT

## 2024-04-02 NOTE — TELEPHONE ENCOUNTER
Due to supply issues and not being able to find the Mounjaro 7.5 mg I did discuss sending 5 mg to the pharmacy to ensure that the patient remains on this medication.  Verbalized understanding on the reasons for decreasing the dosage

## 2024-04-26 RX ORDER — FLUCONAZOLE 150 MG/1
150 TABLET ORAL ONCE
Qty: 2 TABLET | Refills: 0 | Status: SHIPPED | OUTPATIENT
Start: 2024-04-26 | End: 2024-04-28

## 2024-04-29 RX ORDER — TIRZEPATIDE 5 MG/.5ML
INJECTION, SOLUTION SUBCUTANEOUS
OUTPATIENT
Start: 2024-04-29

## 2024-05-17 PROCEDURE — 87660 TRICHOMONAS VAGIN DIR PROBE: CPT | Performed by: NURSE PRACTITIONER

## 2024-05-17 PROCEDURE — 87077 CULTURE AEROBIC IDENTIFY: CPT | Performed by: NURSE PRACTITIONER

## 2024-05-17 PROCEDURE — 87480 CANDIDA DNA DIR PROBE: CPT | Performed by: NURSE PRACTITIONER

## 2024-05-17 PROCEDURE — 87086 URINE CULTURE/COLONY COUNT: CPT | Performed by: NURSE PRACTITIONER

## 2024-05-17 PROCEDURE — 87510 GARDNER VAG DNA DIR PROBE: CPT | Performed by: NURSE PRACTITIONER

## 2024-05-17 PROCEDURE — 87186 SC STD MICRODIL/AGAR DIL: CPT | Performed by: NURSE PRACTITIONER

## 2024-05-18 DIAGNOSIS — N76.0 BACTERIAL VAGINOSIS: Primary | ICD-10-CM

## 2024-05-18 DIAGNOSIS — B96.89 BACTERIAL VAGINOSIS: Primary | ICD-10-CM

## 2024-05-18 RX ORDER — METRONIDAZOLE 500 MG/1
500 TABLET ORAL 2 TIMES DAILY
Qty: 14 TABLET | Refills: 0 | Status: SHIPPED | OUTPATIENT
Start: 2024-05-18 | End: 2024-05-27

## 2024-05-20 ENCOUNTER — TELEPHONE (OUTPATIENT)
Dept: URGENT CARE | Facility: CLINIC | Age: 64
End: 2024-05-20
Payer: COMMERCIAL

## 2024-05-21 ENCOUNTER — TELEPHONE (OUTPATIENT)
Dept: URGENT CARE | Facility: CLINIC | Age: 64
End: 2024-05-21
Payer: COMMERCIAL

## 2024-05-27 DIAGNOSIS — F90.0 ADHD (ATTENTION DEFICIT HYPERACTIVITY DISORDER), INATTENTIVE TYPE: ICD-10-CM

## 2024-05-28 RX ORDER — DEXTROAMPHETAMINE SACCHARATE, AMPHETAMINE ASPARTATE, DEXTROAMPHETAMINE SULFATE AND AMPHETAMINE SULFATE 1.25; 1.25; 1.25; 1.25 MG/1; MG/1; MG/1; MG/1
5 TABLET ORAL DAILY
Qty: 30 TABLET | Refills: 0 | Status: SHIPPED | OUTPATIENT
Start: 2024-05-28

## 2024-05-28 RX ORDER — DEXTROAMPHETAMINE SACCHARATE, AMPHETAMINE ASPARTATE MONOHYDRATE, DEXTROAMPHETAMINE SULFATE AND AMPHETAMINE SULFATE 7.5; 7.5; 7.5; 7.5 MG/1; MG/1; MG/1; MG/1
30 CAPSULE, EXTENDED RELEASE ORAL EVERY MORNING
Qty: 30 CAPSULE | Refills: 0 | Status: SHIPPED | OUTPATIENT
Start: 2024-05-28

## 2024-06-10 NOTE — TELEPHONE ENCOUNTER
UPCOMING APPTS  No upcoming appointments  LAST OFFICE VISIT - THIS DEPT  12/6/2023 Royal Beard, APRN

## 2024-08-13 DIAGNOSIS — F90.0 ADHD (ATTENTION DEFICIT HYPERACTIVITY DISORDER), INATTENTIVE TYPE: ICD-10-CM

## 2024-08-13 RX ORDER — DEXTROAMPHETAMINE SACCHARATE, AMPHETAMINE ASPARTATE MONOHYDRATE, DEXTROAMPHETAMINE SULFATE AND AMPHETAMINE SULFATE 7.5; 7.5; 7.5; 7.5 MG/1; MG/1; MG/1; MG/1
30 CAPSULE, EXTENDED RELEASE ORAL EVERY MORNING
Qty: 30 CAPSULE | Refills: 0 | OUTPATIENT
Start: 2024-08-13

## 2024-08-28 ENCOUNTER — TELEMEDICINE (OUTPATIENT)
Dept: PSYCHIATRY | Facility: CLINIC | Age: 64
End: 2024-08-28
Payer: COMMERCIAL

## 2024-08-28 DIAGNOSIS — Z79.899 MEDICATION MANAGEMENT: ICD-10-CM

## 2024-08-28 DIAGNOSIS — F90.8 ADHD, ADULT RESIDUAL TYPE: Primary | Chronic | ICD-10-CM

## 2024-08-28 NOTE — PROGRESS NOTES
This provider is located at the [facility]through [facility]), [address], [place], [address] using a secure [employer] Video Visit through [employer] [employer]. Patient is being seen remotely via telehealth at their home address in [place], and stated they are in a secure environment for this session. The patient's condition being diagnosed/treated is appropriate for telemedicine. The provider identified herself as well as her credentials.   The patient, and/or patients guardian, consent to be seen remotely, and when consent is given they understand that the consent allows for patient identifiable information to be sent to a third party as needed.   They may refuse to be seen remotely at any time. The electronic data is encrypted and password protected, and the patient and/or guardian has been advised of the potential risks to privacy not withstanding such measures.    You have chosen to receive care through a telehealth visit.  Do you consent to use a video/audio connection for your medical care today? Yes    Patient identifiers utilized: Name and date of birth.    Patient verbally confirmed consent for today's encounter  08/28/2024 .    The patient does verbally confirm they are being seen today while physically located in the [facility].  This provider/this APRN is licensed in the [facility] where the patient is located/being seen.           Subjective   Ami Lewis is a 63 y.o. female who presents today for initial evaluation     Chief Complaint:  ADHD      History of Present Illness:   -The patient presents today for initial evaluation for medication management. The patient referred by her previous PMHNP for a transfer of care as he is leaving the practice. The patient states that she has a history of ADHD.  She states that she has suffered from ADHD symptoms her entire life, but states that in childhood she was never evaluated or diagnosed with ADHD because  "\"it was not a thing then\".  She reports that she was formally evaluated and diagnosed with ADHD approximately 2.5 years ago.  She states that she completed psychological testing, which she endorses as when she was diagnosed with ADHD initially.  She states that she began treatment for ADHD at that time and that symptoms have been very well-controlled since then.  She states that it was \"life-changing\" for her to be treated for ADHD and see what life could be like when she was not struggling with these types of symptoms.  She states that currently her symptoms are still well-controlled and manageable.  She states that she does have some mild ADHD symptoms still, but states that overall symptoms are well controlled and easily manageable.  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  She states that having her ADHD symptoms manage through that time was paramount for her and that she is not sure she would have made it through her nursing program without having been treated for ADHD.  The patient denies any history of depression or anxiety.  She has experienced \"normal\" periods of sadness, such as bereavement after her father passed away, as well as \"normal\" periods of stress in relation to life stressors, but states that she does not feel she has ever struggled with symptoms of depression or anxiety to the extent that she would qualify as having \"clinical depression or anxiety\".  The patient denies any depression or anxiety currently.  She states that she does experience normal stress related to current life stressors such as working and going to school, but otherwise denies any anxiety.  The patient reports that sleep has always been a bit difficult for her, but states that lately she has been listening to sleep sounds to help her fall asleep and that this has been somewhat helpful.  She estimates that she " awakens 2-3 times per night, but that typically she can fall back asleep easily.  The patient reports that her appetite is good.    -The patient rates symptoms of reported ADHD at a 2/10 on a 0-10 scale, with 10 being the worst.     -Changes in medications or new medical problems/concerns since last visit with previous PMHNP: Denies  -Reported medication compliance: The patient reports compliance with their current psychotropic medication regimen.    -Reported medication side effects or concerns: Denies     -Auditory or visual hallucinations: Denies  -Behaviors different from patient baseline, or any reckless, impulsive, or risky behaviors: Denies  -Symptoms of fredrick or psychosis: Denies  -Self-injurious behavior: Denies  -SI/HI: The patient adamantly denies any suicidal or homicidal ideations, plans, or intent at the time of this encounter and is convincing.    -The patient does verbally contract for safety at today's encounter and is in verbal agreement with the safety/crisis plan. The patient reports in their own words that they will reach out to this APRN/office prior to next scheduled appointment if there is any worsening of mood, any new psychiatric symptoms, any medication side effects or concerns, any concern for safety to self or others, any suicidal or homicidal ideations plans or intent, or any concerns, or they will call 911, call or text the suicide and crisis lifeline at [phone], or go to the closest emergency department.               Current Psychiatric Medications:  Adderall XR 30 mg daily  Adderall 5 mg daily   Reports she cannot recall exactly how long she has been on her current medication regimen, but states that at some point after initiating treatment for ADHD approximately 2.5 years ago she was unable to get Vyvanse from the pharmacy due to nationwide shortage at which point she states she was switched to her current medication regimen.  Reports that the Vyvanse seemed to be more effective and  "symptoms were more consistently controlled in comparison to her current medication regimen, but states that current medication regimen is still effective for her and well tolerated without any side effects.    Prior Psychiatric Medications:  Wellbutrin - reports ineffective for smoking cessation several years ago  Zoloft - reports cannot recall response, but treated with this medication several years ago  Vyvanse - reports very effective for treatment of ADHD and well-tolerated, but states that medication had to be discontinued due to nationwide shortage, which she endorses when she was switched to Adderall instead    Currently in Counseling or Therapy:  Denies    Prior Psychiatric Outpatient Care:  Reports she established in treatment with SEPIDEH Loyd for psychotropic medication management approximately 2.5 years ago.  Otherwise denies any prior psychiatric outpatient care.    Prior Psychiatric Hospitalizations:  Denies    Previous Suicide Attempts:  Denies    Previous Self-Harming Behavior:  Denies    Any family history of suicide attempts:  Denies    Legal History, Arrests, or Incarcerations:  No current legal charges pending.  Denies any history of arrests or incarcerations.     History of Seizures or TBI:  Denies    Highest Level of Education:  Graduated high school.  Reports she completed some college previously, but states that she did not complete a degree.  Reports that she is currently pursuing her associates degree in nursing and that she will be completing her nursing program in approximately 3 weeks.    Employment:  Tech in      History:  Denies    Substance Abuse History:  Alcohol: Reports she uses alcohol rarely on occasion.  States for example that she may have a glass of wine \"every now and then\".  Denies any history of alcohol abuse.  Smoking/Cigarettes: Reports she currently smokes approximately 0.5 PPD.  Reports that most recently she has been smoking for approximately 10-15 " years.  Reports that prior to this she had quit smoking for approximately 14-16 years before she started again.  She states that she smoked for just a few years before she quit the first time.  Reports that she is currently planning on stopping smoking, but states that she is waiting until she finishes nursing school within the next few weeks.  Vaping: Denies  Smokeless Tobacco: Denies  Illicit Substances: Denies  Prescription Medication Misuse: Denies    Social History:  Born: [place], [place]  Raised: different places around the .  Reports that her father in the  and the family moved around often due to this.    Currently resides in [place], [place]  Marriage status:    Children: Four children - ages 42, 38, 36, and 34  Lives with: The patient's currently household consists of the patient.  States that she lives alone.    Trauma/Abuse History:  Verbal: Denies  Emotional: Denies  Mental: Denies  Physical: Denies  Sexual: Denies  Rape: Denies  Other: Denies    Patient's Support Network Includes:   mother, siblings, daughter    Last Menstrual Period:  Post-menopausal   The patient was educated that her prescribed medications can have potential risk to a developing fetus. The patient is advised to contact this APRN/this office if she becomes pregnant or plans to become pregnant.  Pt verbalizes understanding and acknowledged agreement with this plan in her own words.         The following portions of the patient's history were reviewed and updated as appropriate: allergies, current medications, past family history, past medical history, past social history, past surgical history and problem list.          Past Medical History:  Past Medical History:   Diagnosis Date    ADHD (attention deficit hyperactivity disorder)     Diabetes mellitus May 2022       Social History:  Social History     Socioeconomic History    Marital status:    Tobacco Use    Smoking status: Every Day     Current  packs/day: 0.50     Average packs/day: 0.5 packs/day for 10.0 years (5.0 ttl pk-yrs)     Types: Cigarettes    Smokeless tobacco: Never   Vaping Use    Vaping status: Never Used   Substance and Sexual Activity    Alcohol use: Not Currently    Drug use: Never    Sexual activity: Defer       Family History:  Family History   Problem Relation Age of Onset    Breast cancer Mother     Cervical cancer Mother     COPD Mother     Lung cancer Mother     Colon polyps Mother     Diabetes Paternal Grandmother        Past Surgical History:  Past Surgical History:   Procedure Laterality Date     SECTION      CLUB FOOT RELEASE      TUBAL ABDOMINAL LIGATION         Problem List:  Patient Active Problem List   Diagnosis    Anxiety    Deafness    Depression    Head injury    Primary localized osteoarthrosis, ankle and foot       Allergy:   Allergies   Allergen Reactions    Codeine Itching    Flexeril [Cyclobenzaprine] Nausea And Vomiting    Sulfa Antibiotics Hives        Current Medications:   Current Outpatient Medications   Medication Sig Dispense Refill    amphetamine-dextroamphetamine (Adderall) 5 MG tablet Take 1 tablet by mouth Daily. 30 tablet 0    amphetamine-dextroamphetamine XR (Adderall XR) 30 MG 24 hr capsule Take 1 capsule by mouth Every Morning 30 capsule 0    ibuprofen (ADVIL,MOTRIN) 800 MG tablet Take 1 tablet by mouth Every 6 (Six) Hours As Needed for Moderate Pain. 90 tablet 0    metFORMIN ER (GLUCOPHAGE-XR) 500 MG 24 hr tablet Take 1 tablet by mouth Daily With Breakfast. 90 tablet 1    Tirzepatide (MOUNJARO) 7.5 MG/0.5ML solution pen-injector pen Inject 0.5 mL under the skin into the appropriate area as directed 1 (One) Time Per Week. 2 mL 2    vitamin D (ERGOCALCIFEROL) 1.25 MG (52434 UT) capsule capsule Take 1 capsule by mouth Every 7 (Seven) Days. 13 capsule 1    Viloxazine HCl  MG capsule sustained-release 24 hr Take 1 capsule by mouth Daily. 30 capsule 0     No current facility-administered  medications for this visit.       Review of Symptoms:    Review of Systems   Constitutional:  Positive for fatigue. Negative for activity change, appetite change, unexpected weight gain and unexpected weight loss.   Psychiatric/Behavioral:  Positive for decreased concentration, sleep disturbance (r/t school/work schedule) and stress. Negative for agitation, behavioral problems, dysphoric mood, hallucinations, self-injury, suicidal ideas, negative for hyperactivity and depressed mood. The patient is not nervous/anxious.          Physical Exam:   There were no vitals taken for this visit. There is no height or weight on file to calculate BMI.     The patient was seen remotely today via a [employer] Video Visit through [employer].  Unable to obtain vital signs due to nature of remote visit.  Height stated at 69 inches.  Weight stated at 202 pounds.     Due to the nature of virtual visits and inability to monitor vital signs and weight with virtual visits, the patient has been encouraged to monitor their vital signs and weight regularly either through self-monitoring via home device(s) or with their Primary Care Provider, and the patient has been instructed to notify this APRN of any abnormalities or significant changes from baseline.       Physical Exam  Constitutional:       Appearance: Normal appearance.   Neurological:      Mental Status: She is alert.   Psychiatric:         Attention and Perception: Attention and perception normal.         Mood and Affect: Mood and affect normal.         Speech: Speech normal.         Behavior: Behavior normal. Behavior is cooperative.         Thought Content: Thought content normal. Thought content does not include homicidal or suicidal ideation. Thought content does not include homicidal or suicidal plan.         Cognition and Memory: Cognition and memory normal.         Judgment: Judgment normal.           Mental Status Exam:   Hygiene:   good  Cooperation:  Cooperative  Eye Contact:   Good  Psychomotor Behavior:  Appropriate  Affect:  Full range  Mood: normal and euthymic  Hopelessness: Denies  Speech:  Normal  Thought Process:  Goal directed and Linear  Thought Content:  Normal  Suicidal:  None  Homicidal:  None  Hallucinations:  None  Delusion:  None  Memory:  Intact  Orientation:  Person, Place, Time, and Situation  Reliability:  good  Insight:  Good  Judgement:  Good  Impulse Control:  Good  Physical/Medical Issues:  Yes See medical history            PHQ-9 Depression Screening  Little interest or pleasure in doing things? (P) 1-->several days   Feeling down, depressed, or hopeless? (P) 0-->not at all   Trouble falling or staying asleep, or sleeping too much? (P) 1-->several days   Feeling tired or having little energy? (P) 1-->several days   Poor appetite or overeating? (P) 0-->not at all   Feeling bad about yourself - or that you are a failure or have let yourself or your family down? (P) 0-->not at all   Trouble concentrating on things, such as reading the newspaper or watching television? (P) 1-->several days   Moving or speaking so slowly that other people could have noticed? Or the opposite - being so fidgety or restless that you have been moving around a lot more than usual? (P) 0-->not at all   Thoughts that you would be better off dead, or of hurting yourself in some way? (P) 0-->not at all   PHQ-9 Total Score (P) 4   If you checked off any problems, how difficult have these problems made it for you to do your work, take care of things at home, or get along with other people? (P) not difficult at all       ALEXA-7  Feeling nervous, anxious or on edge: (P) Not at all  Not being able to stop or control worrying: (P) Not at all  Worrying too much about different things: (P) Not at all  Trouble Relaxing: (P) Several days  Being so restless that it is hard to sit still: (P) Several days  Feeling afraid as if something awful might happen: (P) Not at all  Becoming easily annoyed or irritable:  (P) Not at all  ALEXA 7 Total Score: (P) 2  If you checked any problems, how difficult have these problems made it for you to do your work, take care of things at home, or get along with other people: (P) Not difficult at all        The JEOVANY report, request number [id number], of the past 12 months were reviewed.    Past available provider notes reviewed by this APRN at today's encounter.         Lab Results:   No visits with results within 1 Month(s) from this visit.   Latest known visit with results is:   Admission on 05/17/2024, Discharged on 05/17/2024   Component Date Value Ref Range Status    Color 05/17/2024 Yellow   Final    Clarity, UA 05/17/2024 Slightly Cloudy (A)   Final    Glucose, UA 05/17/2024 Negative  mg/dL Final    Bilirubin 05/17/2024 Negative   Final    Ketones, UA 05/17/2024 Negative   Final    Specific Gravity  05/17/2024 1.020  1.005 - 1.030 Final    Blood, UA 05/17/2024 Negative   Final    pH, Urine 05/17/2024 6.0  5.0 - 8.0 Final    Protein, POC 05/17/2024 Trace (A)  mg/dL Final    Urobilinogen, UA 05/17/2024 0.2 E.U./dL   Final    Nitrite, UA 05/17/2024 Negative   Final    Leukocytes 05/17/2024 Negative   Final    Urine Culture 05/17/2024 25,000 CFU/mL Escherichia coli (A)   Final    GARDNERELLA VAGINALIS 05/17/2024 Positive (A)  Negative Final    TRICHOMONAS VAGINALIS 05/17/2024 Negative  Negative Final    MAY SPECIES 05/17/2024 Negative  Negative Final         Assessment & Plan   Diagnoses and all orders for this visit:    1. ADHD, adult residual type (Primary)  -     Viloxazine HCl  MG capsule sustained-release 24 hr; Take 1 capsule by mouth Daily.  Dispense: 30 capsule; Refill: 0  -     Ambulatory Referral to Psychiatry    2. Medication management  -     Ambulatory Referral to Psychiatry        Visit Diagnoses:    ICD-10-CM ICD-9-CM   1. ADHD, adult residual type  F90.8 314.01   2. Medication management  Z79.899 V58.69          GOALS:  Short Term Goals: Patient will be compliant  with medication, and patient will have no significant medication related side effects.  Patient will be engaged in psychotherapy as indicated.  Patient will report subjective improvement of symptoms.  Long term goals: To stabilize mood and treat/improve subjective symptoms, the patient will stay out of the hospital, the patient will be at an optimal level of functioning, and the patient will take all medications as prescribed.  The patient verbalized understanding and agreement with goals that were mutually set.      SUICIDE RISK ASSESSMENT: Unalterable demographics and a history of mental health intervention indicate this patient is in a high risk category compared to the general population. At present, the patient denies active SI/HI, intentions, or plans at this time and agrees to seek immediate care should such thoughts develop. The patient verbalizes understanding of how to access emergency care if needed and agrees to do so. Consideration of suicide risk and protective factors such as history, current presentation, individual strengths and weaknesses, psychosocial and environmental stressors and variables, psychiatric illness and symptoms, medical conditions and pain, took place in this interview. Based on those considerations, the patient is determined: within individual baseline and presenting no imminent risk for suicide or homicide. Other recommendations: The patient does not meet the criteria for inpatient admission and is not a safety risk to self or others at today's visit. Inpatient treatment offers no significant advantages over outpatient treatment for this patient at today's visit.      SAFETY PLAN:  Patient was given ample time for questions and fully participated in treatment planning.  Patient was encouraged to call the clinic with any questions or concerns.  Patient was informed of access to emergency care. If patient were to develop any significant symptomatology, suicidal ideation, homicidal  ideation, any concerns, or feel unsafe at any time they are to call the clinic and if unable to get immediate assistance should immediately call 911 or go to the nearest emergency room.  The patient is advised to remove or secure (lock away) all lethal weapons (including guns) and sharps (including razors, scissors, knives, etc.).  All medications (including any prescribed and any over the counter medications) should be stored in a safe and secured location that is not obtainable by children/adolescents.  Patient was given an opportunity and encouraged to ask questions about their medication, illness, and treatment. Patient contracted verbally for the following: If you are experiencing an emotional crisis or have thoughts of harming yourself or others, please go to your nearest local emergency room or call 911. Will continue to re-assess medication response and side effects frequently to establish efficacy and ensure safety. Risks, any black box warnings, side effects, off label usage, and benefits of medication and treatment discussed with patient, along with potential adverse side effects of current and/or newly prescribed medication, alternative treatment options, and OTC medications.  Patient verbalized understanding of potential risks, any off label use of medication, any black box warnings, and any side effects in their own words. The patient verbalized understanding and agreed to comply with the safety plan discussed in their own words.  Patient given the number to the office. Number also available to the 24- hour suicide hotline.       TREATMENT PLAN: Continue supportive psychotherapy efforts and medications as indicated.  Medication and treatment options, both pharmacological and non-pharmacological treatment options, discussed during today's visit, including any off label use of medication. Patient acknowledged and verbally consented with current treatment plan and was educated on the importance of  "compliance with treatment and follow-up appointments.          -The patient requests to establish care and continue treatment for the patient's reported ADHD.  To continue with testing and potential future treatment for patient's reported ADHD the patient will need to complete the following recommendations:  1. The patient will need further psychological testing through a Psychologist for more in-depth definitive testing and to rule out other differential diagnoses that could potentially be contributing to the patient's symptomology.  The patient has been advised to contact their insurance company and request a list of preferred Psychologists in the patient's area that is covered under the patient's current insurance plan for a psychological evaluation.  The patient states that she has previously completed psychological testing.  Discussed with the patient that per review of her medical record there are no results of psychological testing available.  Discussed with the patient that the only available records regarding this is a letter from a psychologist (Phill Bustos, PhD) to her previous PMHNP (Quincy Yap, PMHNP) stating that the patient \"meets all the criteria for Adult Attention-Deficit/Hyperactivity Disorder, combined, ICD F90.2\".  And that she had indicated chronic difficulties with attention to details, organizing tasks, avoiding tasks requiring a lot of thought, and fidgeting and squirming on the Adult ADHD Self-Report Scale ASRS-v1.1).  Discussed with the patient that the actual results of psychological testing are required by this APRN for review to confirm diagnosis of ADHD, and that letter currently available in the patient's medical record is not sufficient to meet this so she would need to obtain records of psychological testing and provide to this APRN for review for confirmation of diagnosis of ADHD.  2. The patient will need to follow up with their Primary Care Provider for a 12-lead ECG, " measurement and monitoring of vital signs including blood pressure and heart rate, and documented medical clearance that the patient is medically safe to pursue stimulant medication approaches for the treatment of ADHD.  -Also discussed with the patient that the Biden Administration announced on January 30, 2023 that the national and public health emergencies (PHE) would end on May 11, 2023.  During the PHE a portion of the Ludin Alexia Act was waived, allowing controlled substances to be provided via telemedicine without in person encounters.  The Drug Enforcement Agency ([employer]) acknowledged in the absence of an extension of this ruling a backlog for in-person evaluations might result.  On May 10, 2023, the [employer], jointly with the Substance Abuse and Mental Health Services Administration (SAMHSA), issued a temporary rule to extend certain exceptions granted during the [facility] in order to avoid lapses in care for patients.  When this extension ends the Ludin Alexia Act will go back into full effect, meaning that prescriptions for controlled substances can not be given via telemedicine without in person encounters and meeting all requirements of the Ludin Alexia Act.  The [facility] is strictly a telemedicine clinic and cannot offer the patient an in-person evaluation to be compliant with the Ludin Alexia Act as it goes back into effect.  Unless there are legislative changes prior to the ending of this extension, the patient will no longer be able to obtain prescription(s) for controlled substance from this [facility]/the [facility].  In order for the patient to avoid backlogs for in-person evaluations, and avoid a lapse in care when this extension ends, the patient has been advised to establish care now/as soon as possible with a local provider in their area for an in-person evaluation and treatment with any controlled substances.  Also discussed with  "the patient that this APRN will be taking a planned leave of absence at the end of the year.  Discussed with the patient that leave of absence is currently planned for the beginning of October, in approximately 5 weeks, but is subject to change and may likely begin sooner.  Discussed with the patient that this APRN will likely be out of office for remainder of the year once leave of absence begins.  Discussed with the patient this APRN is recommending all patients taking controlled substances that have not been seen in-person for evaluation by the provider managing/prescribing their controlled substance(s) begin actively seeking to transition care to in-person services to ensure appropriate continuity of care prior to date that [employer] has announced that temporary extensions will end and rules/regulations regarding prescribing controlled substances virtually are set to go back into full effect.  -Using a shared decision-making approach the patient reports she would like to continue treatment for ADHD at this time.  She states that she initially began treatment for ADHD with Vyvanse and states that she liked this medication the best as it seemed to manage her symptoms more consistently and that it was \"a smoother ride\" for her when taking this medication.  She states that the Vyvanse had to be discontinued due to nationwide shortage, which she states is why she was switched to her current treatment regimen of Adderall XR and Adderall, but states that she has recently been in touch with a local pharmacy who has reported to her that they have previous dose of Vyvanse 40 mg daily in stock and endorses that she would prefer to switch back to the Vyvanse at this time if possible, but if not she would like to continue current treatment regimen.  Discussed with the patient the requirements/safety measures this APRN has in place that will need to be completed prior to this APRN taking over prescribing of any controlled " substances, as these are in place to ensure the patient's safety when prescribing high risk medications/stimulants virtually (see bullet points above for requirements/safety measures that have been referenced).  Also discussed with the patient the impending [employer] regulatory changes as well as this provider's impending leave of absence (see bullet points above for further explanation/discussion with patient regarding [employer] regulatory changes and providers impending leave of absence that have been referenced).  Discussed with the patient that this APRN is willing to treat the patient with either current treatment regimen, or Vyvanse, for continued treatment of reported ADHD once requirements/safety measures are completed and the patient is determined to be an appropriate candidate for continuation of treatment for reported ADHD with controlled substances/stimulants virtually, but again discussed with the patient the time limitations that will be a hindrance in the future given impending [employer] regulatory changes and provider's impending leave of absence.  Discussed with the patient that given this information we can proceed one of a few ways.  Option 1 being make no medication changes/adjustments and have the patient complete requirements/safety measures this provider has in place in order for this provider to take on prescribing of controlled substances/stimulants virtually (if determined to be an appropriate candidate), option 2 being that we utilize nonstimulants for treatment of ADHD at this time while the patient is working on completing requirements/safety measures that are in place in order for this provider to take on prescribing of controlled substances/stimulants virtually (if deemed an appropriate candidate), option 3 being that we utilize nonstimulants for treatment of ADHD at this time and refer the patient for in-person behavioral health services to establish care as per the [employer] this will be required as of  01/01/2025 to be compliant with federal regulations/guidelines for treatment with controlled substances/stimulants, or option 4 being just to place referral for in-person behavioral health services at this time without any medication changes/adjustments.  The patient endorses that she would be interested in option 3 and initiating treatment with nonstimulant at this time as she would like to try to prevent any lapses in treatment as she endorses she was not informed upon scheduling that there would be the possibility that her current medication regimen may not be continued by new provider, and endorses she would also prefer to put in a referral for in-person behavioral health services to establish care as she endorses that she would like to just go ahead and be established if this is the [employer]'s current recommendation and what we will be required of her in just a few short months and so that this has been initiated in the event that nonstimulant treatment/medication options are ineffective for her.  Discussed several different nonstimulant treatment/medication options with the patient, and she endorses she would feel most comfortable with trialing Qelbree.  Discussed with patient that we will begin Qelbree 200 mg daily for treatment of reported ADHD as tolerated at this time, and that referral for in-person services has been placed per her request at this time.  The patient verbalizes understanding in her own words and endorses that she is agreeable to this.        MEDICATION ISSUES: Discussed medication options and treatment plan of prescribed medication, any off label use of medication, as well as the risks, benefits, any black box warnings including increased suicidality, and side effects including but not limited to potential falls, dizziness, possible impaired driving, GI side effects (change in appetite, abdominal discomfort, nausea, vomiting, diarrhea, and/or constipation), dry mouth, somnolence, sedation,  insomnia, activation, agitation, irritation, tremors, abnormal muscle movements or disorders, headache, sweating, possible bruising or rare bleeding, electrolyte and/or fluid abnormalities, change in blood pressure/heart rate/and or heart rhythm, sexual dysfunction, and metabolic adversities among others. Patient and/or guardian agreeable to call the office with any worsening of symptoms or onset of side effects, or if any concerns or questions arise.  The contact information for the office is made available to the patient and/or guardian.  Patient and/or guardian agreeable to call 911 or go to the nearest ER should they begin having any SI/HI, or if any urgent concerns arise. No medication side effects or related complaints today.                VERBAL INFORMED CONSENT FOR MEDICATION:  The patient was educated that their proposed/prescribed psychotropic medication(s) has potential risks, side effects, adverse effects, and black box warnings; and these have been discussed with the patient.  The patient has been informed that their treatment and medication dosage is to be individualized, and may even be above or below the recommended range/dosage due to patient individualization and response, but medication is prescribed using a shared decision making approach, and no medication or dosage will be prescribed without the patient's verbal consent.  The reason for the use of the medication including any off label use and alternative modes of treatment other than or in addition to medication has been considered and discussed, the probable consequences of not receiving the proposed treatment have been discussed, and any treatment side effects, black box warnings, and cautions associated with treatment have been discussed with the patient.  The patient is allowed ample time to openly discuss and ask questions regarding the proposed medication(s) and treatment plan and the patient verbalizes understanding the reasons for the  use of the medication, its potential risks and benefits, other alternative treatment(s), and the probable consequences that may occur if the proposed medication is not given.  The patient has been given ample time to ask questions and study the information and find the information to be specific, accurate, and complete.  The patient gives verbal consent for the medication(s) proposed/prescribed, they verbalized understanding that they can refuse and withdraw consent at any time with the assistance of this APRN, and the patient has verbally confirmed that they are aware, and are willing, to take the prescribed medication and follow the treatment plan with the known possible risks, side effect, black box warnings, and any potential medication interactions, and the patient reports they will be worse off without this medication and treatment plan.  The patient is advised to contact this APRN/this office if any questions or concerns arise at any time (at [phone]), or call 911/go to the closest emergency department if needed or outside of office hours.         [facility] No Show Policy:  We understand unexpected circumstances arise; however, anytime you miss your appointment we are unable to provide you appropriate care.  In addition, each appointment missed could have been used to provide care for others.  We ask that you call at least 24 hours in advance to cancel or reschedule an appointment.  We would like to take this opportunity to remind you of our policy stating patients who miss THREE or more appointments without cancelling or rescheduling 24 hours in advance of the appointment may be subject to cancellation of any further visits with our clinic and recommendation to seek in-person services/visits.    Please call [phone] to reschedule your appointment. If there are reasons that make it difficult for you to keep the appointments, please call and let us know how we can  help.  Please understand that medication prescribing will not continue without seeing your provider.      [facility]'s No Show Policy reviewed with patient at today's visit. Patient verbalized understanding of this policy. Discussed with patient that in the event that there are three or more no show visits, it will be recommended that they pursue in-person services/visits as noncompliance with telehealth visits indicates that patient is not an appropriate candidate for telemedicine and would likely be more appropriate for in-person services/visits. Patient verbalizes understanding and is agreeable to this.             MEDS ORDERED DURING VISIT:  New Medications Ordered This Visit   Medications    Viloxazine HCl  MG capsule sustained-release 24 hr     Sig: Take 1 capsule by mouth Daily.     Dispense:  30 capsule     Refill:  0       Return in about 4 weeks (around 9/25/2024), or if symptoms worsen or fail to improve, for Recheck.       Patient will follow-up in 4 weeks, highly encouraged the patient if she had any questions or concerns to contact the behavioral health virtual clinic for sooner appointment patient verbalized understanding.        Functional Status: Mild impairment     Prognosis: Guarded with Ongoing Treatment             This document has been electronically signed by YENNIFER Doshi  August 28, 2024 15:33 EDT    Part of this note may be an electronic transcription/translation of spoken language to printed text using the Dragon Dictation System.

## 2024-12-23 DIAGNOSIS — E55.9 VITAMIN D DEFICIENCY: ICD-10-CM

## 2024-12-23 RX ORDER — ERGOCALCIFEROL 1.25 MG/1
50000 CAPSULE, LIQUID FILLED ORAL
Qty: 13 CAPSULE | Refills: 1 | OUTPATIENT
Start: 2024-12-23

## 2025-01-29 ENCOUNTER — OFFICE VISIT (OUTPATIENT)
Dept: FAMILY MEDICINE CLINIC | Facility: CLINIC | Age: 65
End: 2025-01-29
Payer: COMMERCIAL

## 2025-01-29 VITALS
BODY MASS INDEX: 28.86 KG/M2 | WEIGHT: 201.6 LBS | OXYGEN SATURATION: 98 % | HEART RATE: 100 BPM | SYSTOLIC BLOOD PRESSURE: 122 MMHG | TEMPERATURE: 96.2 F | DIASTOLIC BLOOD PRESSURE: 82 MMHG | HEIGHT: 70 IN

## 2025-01-29 DIAGNOSIS — G47.00 INSOMNIA, UNSPECIFIED TYPE: ICD-10-CM

## 2025-01-29 DIAGNOSIS — Z78.0 POSTMENOPAUSAL: ICD-10-CM

## 2025-01-29 DIAGNOSIS — N39.3 STRESS INCONTINENCE: ICD-10-CM

## 2025-01-29 DIAGNOSIS — F90.9 ATTENTION DEFICIT HYPERACTIVITY DISORDER (ADHD), UNSPECIFIED ADHD TYPE: ICD-10-CM

## 2025-01-29 DIAGNOSIS — E11.65 TYPE 2 DIABETES MELLITUS WITH HYPERGLYCEMIA, WITHOUT LONG-TERM CURRENT USE OF INSULIN: Primary | ICD-10-CM

## 2025-01-29 PROCEDURE — 99214 OFFICE O/P EST MOD 30 MIN: CPT | Performed by: NURSE PRACTITIONER

## 2025-01-29 RX ORDER — ATOMOXETINE 40 MG/1
40 CAPSULE ORAL DAILY
Qty: 90 CAPSULE | Refills: 0 | Status: SHIPPED | OUTPATIENT
Start: 2025-01-29

## 2025-01-29 RX ORDER — TRAZODONE HYDROCHLORIDE 50 MG/1
50 TABLET, FILM COATED ORAL NIGHTLY
Qty: 90 TABLET | Refills: 0 | Status: SHIPPED | OUTPATIENT
Start: 2025-01-29

## 2025-01-29 NOTE — PROGRESS NOTES
"Chief Complaint  ADD    Subjective         Ami Lewis presents to [facility]  Patient presents to the office for follow-up.  She states that she is doing well on her Mounjaro and her A1c has went down to 5.6.  I explained to the patient that we would continue her on this medication as it seems to be working very well.  I did discuss stopping the metformin as she was having adverse side effects of the medication.  She does have concerns about her sleep.  She states that she is getting approximately 2 to 3 hours of sleep each night.  She states that she tries over-the-counter supplements without any improvement in her sleep patterns.  She also states that she has not had her ADHD medication.  I explained to the patient that I could not prescribe stimulants.  Patient states that she is willing to try a nonstimulant medication to see if this allows her to focus more effectively.  Patient does work as a registered nurse.  I explained to the patient that if the medication did not work we would have to refer her to psychiatry to obtain her stimulants verbalized understanding.  Patient is due for her labs.  Is also due for a bone density exam.    ADD       Objective     Vitals:    01/29/25 0943   BP: 122/82   BP Location: Right arm   Patient Position: Sitting   Cuff Size: Adult   Pulse: 100   Temp: 96.2 °F (35.7 °C)   TempSrc: Temporal   SpO2: 98%   Weight: 91.4 kg (201 lb 9.6 oz)   Height: 176.5 cm (69.5\")      Body mass index is 29.34 kg/m².             Physical Exam  Vitals reviewed.   Constitutional:       Appearance: Normal appearance.   Cardiovascular:      Rate and Rhythm: Normal rate and regular rhythm.      Pulses: Normal pulses.      Heart sounds: Normal heart sounds, S1 normal and S2 normal. No murmur heard.  Pulmonary:      Effort: Pulmonary effort is normal. No respiratory distress.      Breath sounds: Normal breath sounds.   Skin:     General: Skin is warm and dry. "   Neurological:      Mental Status: She is alert and oriented to person, place, and time.   Psychiatric:         Attention and Perception: Attention normal.         Mood and Affect: Mood normal.         Behavior: Behavior normal.          Result Review :   The following data was reviewed by: YENNIFER Ortiz on 01/29/2025:      Procedures    Assessment and Plan   Diagnoses and all orders for this visit:    1. Type 2 diabetes mellitus with hyperglycemia, without long-term current use of insulin (Primary)  -     CBC (No Diff); Future  -     Comprehensive Metabolic Panel; Future  -     Hemoglobin A1c; Future  -     Lipid Panel; Future  -     Microalbumin / Creatinine Urine Ratio - Urine, Clean Catch; Future  -     TSH; Future    2. Insomnia, unspecified type  -     traZODone (DESYREL) 50 MG tablet; Take 1 tablet by mouth Every Night.  Dispense: 90 tablet; Refill: 0    3. Stress incontinence  -     Ambulatory Referral to Urology    4. Postmenopausal  -     DEXA Bone Density Axial; Future    5. Attention deficit hyperactivity disorder (ADHD), unspecified ADHD type  -     atomoxetine (Strattera) 40 MG capsule; Take 1 capsule by mouth Daily.  Dispense: 90 capsule; Refill: 0          Follow Up   Return if symptoms worsen or fail to improve.  Patient was given instructions and counseling regarding her condition or for health maintenance advice. Please see specific information pulled into the AVS if appropriate.

## 2025-02-13 DIAGNOSIS — E55.9 VITAMIN D DEFICIENCY: ICD-10-CM

## 2025-02-13 RX ORDER — ERGOCALCIFEROL 1.25 MG/1
50000 CAPSULE, LIQUID FILLED ORAL
Qty: 13 CAPSULE | Refills: 1 | Status: SHIPPED | OUTPATIENT
Start: 2025-02-13

## 2025-02-20 DIAGNOSIS — F90.9 ATTENTION DEFICIT HYPERACTIVITY DISORDER (ADHD), UNSPECIFIED ADHD TYPE: ICD-10-CM

## 2025-02-20 DIAGNOSIS — G47.00 INSOMNIA, UNSPECIFIED TYPE: ICD-10-CM

## 2025-02-20 NOTE — TELEPHONE ENCOUNTER
Upcoming Appts  No upcoming appointments  Last Office Visit - This Dept  1/29/2025 Royal Beard, APRN

## 2025-02-24 DIAGNOSIS — R41.840 INATTENTION: Primary | ICD-10-CM

## 2025-02-24 RX ORDER — TRAZODONE HYDROCHLORIDE 50 MG/1
50 TABLET ORAL NIGHTLY
Qty: 90 TABLET | Refills: 0 | OUTPATIENT
Start: 2025-02-24

## 2025-02-24 RX ORDER — ATOMOXETINE 40 MG/1
40 CAPSULE ORAL DAILY
Qty: 90 CAPSULE | Refills: 0 | OUTPATIENT
Start: 2025-02-24

## 2025-02-27 ENCOUNTER — TELEPHONE (OUTPATIENT)
Dept: FAMILY MEDICINE CLINIC | Facility: CLINIC | Age: 65
End: 2025-02-27
Payer: COMMERCIAL

## 2025-02-27 ENCOUNTER — OFFICE VISIT (OUTPATIENT)
Dept: FAMILY MEDICINE CLINIC | Facility: CLINIC | Age: 65
End: 2025-02-27
Payer: COMMERCIAL

## 2025-02-27 VITALS
HEART RATE: 94 BPM | BODY MASS INDEX: 30.6 KG/M2 | HEIGHT: 69 IN | DIASTOLIC BLOOD PRESSURE: 78 MMHG | OXYGEN SATURATION: 96 % | SYSTOLIC BLOOD PRESSURE: 136 MMHG | WEIGHT: 206.6 LBS | TEMPERATURE: 98 F

## 2025-02-27 DIAGNOSIS — M54.50 ACUTE BILATERAL LOW BACK PAIN WITHOUT SCIATICA: ICD-10-CM

## 2025-02-27 DIAGNOSIS — N15.9 KIDNEY INFECTION: Primary | ICD-10-CM

## 2025-02-27 LAB
BILIRUB BLD-MCNC: NEGATIVE MG/DL
CLARITY, POC: CLEAR
COLOR UR: YELLOW
EXPIRATION DATE: ABNORMAL
GLUCOSE UR STRIP-MCNC: NEGATIVE MG/DL
KETONES UR QL: NEGATIVE
LEUKOCYTE EST, POC: NEGATIVE
Lab: ABNORMAL
NITRITE UR-MCNC: NEGATIVE MG/ML
PH UR: 6.5 [PH] (ref 5–8)
PROT UR STRIP-MCNC: ABNORMAL MG/DL
RBC # UR STRIP: ABNORMAL /UL
SP GR UR: 1.02 (ref 1–1.03)
UROBILINOGEN UR QL: ABNORMAL

## 2025-02-27 PROCEDURE — 99213 OFFICE O/P EST LOW 20 MIN: CPT

## 2025-02-27 PROCEDURE — 81003 URINALYSIS AUTO W/O SCOPE: CPT

## 2025-02-27 PROCEDURE — 87086 URINE CULTURE/COLONY COUNT: CPT

## 2025-02-27 RX ORDER — CEPHALEXIN 500 MG/1
500 CAPSULE ORAL 2 TIMES DAILY
Qty: 14 CAPSULE | Refills: 0 | Status: SHIPPED | OUTPATIENT
Start: 2025-02-27

## 2025-02-27 NOTE — TELEPHONE ENCOUNTER
"Caller: Ami Lewis \"Baudilio\"    Relationship to patient: Self    Best call back number: [phone]     CALLER STATED THAT THE PHARMACY INFORMED THAT HER MOUNJARO REQUIRES MEDICATION PRIOR AUTHORIZATION.  [facility] PHONE NUMBER [phone]    "

## 2025-02-27 NOTE — PROGRESS NOTES
"Ami Lewis presents to [facility] who presents to the clinic with complaints of frequent urination, low back pain, bladder pressure.      History of Present Illness  This is a 64-year-old female who presents to the clinic with complaints of frequent urination, low back pain, and bladder pressure.    Patient states that her symptoms started a few days ago, originally thought it was musculoskeletal in origin and did all the necessary normal things that she normally does to try to treat that.  States that she was taking ibuprofen, alternating with Tylenol, using heat, doing stretches, and nothing really helped.  She then she started developing more frequent urination, and she is noticed a really foul odor to her urine.  And thus decided to prior need to come in for evaluation.    The following portions of the patient's history were personally reviewed and updated as appropriate: allergies, current medications, past medical history, past surgical history, past family history, and past social history.       Objective   Vital Signs:   /78 (BP Location: Left arm, Patient Position: Sitting, Cuff Size: Adult)   Pulse 94   Temp 98 °F (36.7 °C)   Ht 176.5 cm (69.49\")   Wt 93.7 kg (206 lb 9.6 oz)   SpO2 96%   BMI 30.08 kg/m²     Body mass index is 30.08 kg/m².    All labs, imaging, test results, and specialty provider notes reviewed with patient.     Physical Exam  Vitals reviewed.   Constitutional:       Appearance: Normal appearance.   Cardiovascular:      Rate and Rhythm: Normal rate and regular rhythm.      Pulses: Normal pulses.      Heart sounds: Normal heart sounds.   Pulmonary:      Effort: Pulmonary effort is normal.      Breath sounds: Normal breath sounds.   Abdominal:      Tenderness: There is right CVA tenderness and left CVA tenderness.   Neurological:      General: No focal deficit present.      Mental Status: She is alert and oriented to person, place, and " time.                Assessment and Plan:  Diagnoses and all orders for this visit:    1. Kidney infection (Primary)    2. Acute bilateral low back pain without sciatica  -     POCT urinalysis dipstick, automated  -     Cancel: Urine Culture - Urine, Urine, Clean Catch  -     Urine Culture - Urine, Urine, Clean Catch    Other orders  -     cephalexin (KEFLEX) 500 MG capsule; Take 1 capsule by mouth 2 (Two) Times a Day.  Dispense: 14 capsule; Refill: 0      At this point, based off exam and urinalysis, looks like patient may have a kidney infection so we will go ahead and treat with Keflex.  Will go ahead and send urine off for culture as well, if we need to switch antibiotics we will at that time.  If patient's not having any improvement in symptoms, may need to consider CT scan for further evaluation.  Does not have any history of kidney stones.    Follow Up:  No follow-ups on file.    Patient was given instructions and counseling regarding her condition or for health maintenance advice. Please see specific information pulled into the AVS if appropriate.

## 2025-02-28 LAB — BACTERIA SPEC AEROBE CULT: NO GROWTH

## 2025-04-11 DIAGNOSIS — G47.00 INSOMNIA, UNSPECIFIED TYPE: ICD-10-CM

## 2025-04-14 RX ORDER — TRAZODONE HYDROCHLORIDE 50 MG/1
50 TABLET ORAL NIGHTLY
Qty: 90 TABLET | Refills: 0 | Status: SHIPPED | OUTPATIENT
Start: 2025-04-14

## 2025-07-21 DIAGNOSIS — G47.00 INSOMNIA, UNSPECIFIED TYPE: ICD-10-CM

## 2025-07-21 RX ORDER — TRAZODONE HYDROCHLORIDE 50 MG/1
50 TABLET ORAL NIGHTLY
Qty: 90 TABLET | Refills: 0 | Status: SHIPPED | OUTPATIENT
Start: 2025-07-21